# Patient Record
Sex: FEMALE | Employment: UNEMPLOYED | URBAN - METROPOLITAN AREA
[De-identification: names, ages, dates, MRNs, and addresses within clinical notes are randomized per-mention and may not be internally consistent; named-entity substitution may affect disease eponyms.]

---

## 2017-04-28 ENCOUNTER — OFFICE VISIT (OUTPATIENT)
Dept: FAMILY MEDICINE CLINIC | Age: 27
End: 2017-04-28

## 2017-04-28 VITALS
BODY MASS INDEX: 42.33 KG/M2 | HEART RATE: 82 BPM | TEMPERATURE: 98 F | RESPIRATION RATE: 18 BRPM | OXYGEN SATURATION: 98 % | SYSTOLIC BLOOD PRESSURE: 114 MMHG | HEIGHT: 62 IN | WEIGHT: 230 LBS | DIASTOLIC BLOOD PRESSURE: 78 MMHG

## 2017-04-28 DIAGNOSIS — R35.0 URINARY FREQUENCY: ICD-10-CM

## 2017-04-28 DIAGNOSIS — M25.471 ANKLE EDEMA, BILATERAL: Primary | ICD-10-CM

## 2017-04-28 DIAGNOSIS — E55.9 HYPOVITAMINOSIS D: ICD-10-CM

## 2017-04-28 DIAGNOSIS — J45.20 MILD INTERMITTENT ASTHMA WITHOUT COMPLICATION: ICD-10-CM

## 2017-04-28 DIAGNOSIS — J30.1 SEASONAL ALLERGIC RHINITIS DUE TO POLLEN: ICD-10-CM

## 2017-04-28 DIAGNOSIS — M25.472 ANKLE EDEMA, BILATERAL: Primary | ICD-10-CM

## 2017-04-28 RX ORDER — ALBUTEROL SULFATE 90 UG/1
AEROSOL, METERED RESPIRATORY (INHALATION)
Qty: 1 INHALER | Refills: 1 | Status: SHIPPED | OUTPATIENT
Start: 2017-04-28 | End: 2017-10-22

## 2017-04-28 RX ORDER — ACETAMINOPHEN 500 MG
2000 TABLET ORAL DAILY
Qty: 90 CAP | Refills: 5 | Status: SHIPPED | OUTPATIENT
Start: 2017-04-28 | End: 2018-01-22 | Stop reason: SDUPTHER

## 2017-04-28 RX ORDER — CETIRIZINE HCL 10 MG
10 TABLET ORAL DAILY
Qty: 30 TAB | Refills: 4 | Status: SHIPPED | OUTPATIENT
Start: 2017-04-28 | End: 2019-06-27 | Stop reason: SDUPTHER

## 2017-04-28 RX ORDER — HYDROCHLOROTHIAZIDE 25 MG/1
25 TABLET ORAL DAILY
Qty: 30 TAB | Refills: 5 | Status: SHIPPED | OUTPATIENT
Start: 2017-04-28 | End: 2018-01-22 | Stop reason: SDUPTHER

## 2017-04-28 NOTE — MR AVS SNAPSHOT
Visit Information Date & Time Provider Department Dept. Phone Encounter #  
 4/28/2017  8:15 AM Andrea Yadav MD [facility] at [address] [id number] Follow-up Instructions Return 2-3 weeks, for f/u treatment. Upcoming Health Maintenance Date Due DTaP/Tdap/Td series (1 - Tdap) 8/25/2017* PAP AKA CERVICAL CYTOLOGY 6/1/2018 *Topic was postponed. The date shown is not the original due date. Allergies as of 4/28/2017  Review Complete On: 4/28/2017 By: Andrea Yadav MD  
  
 Severity Noted Reaction Type Reactions Shellfish Derived High 07/22/2015    Swelling Wheezing Current Immunizations  Never Reviewed Name Date Hep B Vaccine 11/29/2000, 6/12/2000, 5/8/2000 Hib 3/3/1992 Influenza Vaccine (Quad) PF 10/7/2016 MMR 9/13/1993, 3/27/1990 Poliovirus vaccine 9/13/1993, 3/3/1992, 5/31/1989, 1/19/1989 Not reviewed this visit You Were Diagnosed With   
  
 Codes Comments Ankle edema, bilateral    -  Primary ICD-10-CM: M25.471, M25.472 ICD-9-CM: 719.07 Hypovitaminosis D     ICD-10-CM: E55.9 ICD-9-CM: 268.9 Urinary frequency     ICD-10-CM: R35.0 ICD-9-CM: 788.41 Seasonal allergic rhinitis due to pollen     ICD-10-CM: J30.1 ICD-9-CM: 477.0 Mild intermittent asthma without complication     EAM-14-FW: J45.20 ICD-9-CM: 493.90 Vitals BP Pulse Temp Resp Height(growth percentile) Weight(growth percentile) 114/78 (BP 1 Location: Left arm, BP Patient Position: Sitting) 82 98 °F (36.7 °C) 18 5' 2\" (1.575 m) 230 lb (104.3 kg) LMP SpO2 BMI OB Status Smoking Status 04/15/2017 98% 42.07 kg/m2 Having regular periods Never Smoker Vitals History BMI and BSA Data Body Mass Index Body Surface Area 42.07 kg/m 2 2.14 m 2 Preferred Pharmacy Pharmacy Name Phone [facility], [address] [place] [address] AT [address] [place] [address] Drive [phone] Your Updated Medication List  
  
   
This list is accurate as of: 4/28/17  9:02 AM.  Always use your most recent med list.  
  
  
  
  
 albuterol 90 mcg/actuation inhaler Commonly known as:  PROVENTIL HFA, VENTOLIN HFA, PROAIR HFA Take 1 Puff by inhalation every 4 hours as needed for Wheezing. Indications: BRONCHOSPASM PREVENTION  
  
 cetirizine 10 mg tablet Commonly known as:  ZYRTEC Take 1 Tab by mouth daily. Cholecalciferol (Vitamin D3) 2,000 unit Cap capsule Commonly known as:  VITAMIN D3 Take 2,000 Units by mouth daily. Indications: VITAMIN D DEFICIENCY (HIGH DOSE THERAPY)  
  
 cholestyramine light 4 gram packet Commonly known as:  Scottie Vanegas Take 4 g by mouth daily. hydroCHLOROthiazide 25 mg tablet Commonly known as:  HYDRODIURIL Take 1 Tab by mouth daily. Indications: Edema, hypertension  
  
 multivitamin with iron tablet Take 1 Tab by mouth daily. Prescriptions Sent to Pharmacy Refills  
 hydroCHLOROthiazide (HYDRODIURIL) 25 mg tablet 5 Sig: Take 1 Tab by mouth daily. Indications: Edema, hypertension Class: Normal  
 Pharmacy: [facility] [address] AT [address] Ph #: [phone] Route: Oral  
 Cholecalciferol, Vitamin D3, (VITAMIN D3) 2,000 unit cap capsule 5 Sig: Take 2,000 Units by mouth daily. Indications: VITAMIN D DEFICIENCY (HIGH DOSE THERAPY) Class: Normal  
 Pharmacy: [facility] [address] AT [address] Ph #: [phone] Route: Oral  
 albuterol (PROVENTIL HFA, VENTOLIN HFA, PROAIR HFA) 90 mcg/actuation inhaler 1 Sig: Take 1 Puff by inhalation every 4 hours as needed for Wheezing. Indications: BRONCHOSPASM PREVENTION Class: Normal  
 Pharmacy: [facility] [address] AT [address] Ph #: [phone] cetirizine (ZYRTEC) 10 mg tablet 4 Sig: Take 1 Tab by mouth daily. Class: Normal  
 Pharmacy: [facility] [address] AT [address] Ph #: [phone] Route: Oral  
  
We Performed the Following METABOLIC PANEL, COMPREHENSIVE [75263 CPT(R)] URINALYSIS W/ RFLX MICROSCOPIC [40346 CPT(R)] VITAMIN D, 25 HYDROXY O252180 CPT(R)] Follow-up Instructions Return 2-3 weeks, for f/u treatment. Patient Instructions Managing Your Allergies: Care Instructions Your Care Instructions Managing your allergies is an important part of staying healthy. Your doctor will help you find out what may be causing the allergies. Common causes of allergy symptoms are house dust and dust mites, animal dander, mold, and pollen. As soon as you know what triggers your symptoms, try to reduce your exposure to your triggers. This can help prevent allergy symptoms, asthma, and other health problems. Ask your doctor about allergy medicine or immunotherapy. These treatments may help reduce or prevent allergy symptoms. Follow-up care is a key part of your treatment and safety. Be sure to make and go to all appointments, and call your doctor if you are having problems. It's also a good idea to know your test results and keep a list of the medicines you take. How can you care for yourself at home? · If you think that dust or dust mites are causing your allergies: 
¨ Wash sheets, pillowcases, and other bedding every week in hot water. ¨ Use airtight, dust-proof covers for pillows, duvets, and mattresses. Avoid plastic covers, because they tend to tear quickly and do not \"breathe. \" Wash according to the instructions. ¨ Remove extra blankets and pillows that you don't need. ¨ Use blankets that are machine-washable. ¨ Don't use home humidifiers. They can help mites live longer. · Use air-conditioning. Change or clean all filters every month. Keep windows closed. Use high-efficiency air filters. Don't use window or attic fans, which draw dust into the air. · If you're allergic to pet dander, keep pets outside or, at the very least, out of your bedroom. Old carpet and cloth-covered furniture can hold a lot of animal dander. You may need to replace them. · Look for signs of cockroaches. Use cockroach baits to get rid of them. Then clean your home well. · If you're allergic to mold, don't keep indoor plants, because molds can grow in soil. Get rid of furniture, rugs, and drapes that smell musty. Check for mold in the bathroom. · If you're allergic to pollen, stay inside when pollen counts are high. · Don't smoke or let anyone else smoke in your house. Don't use fireplaces or wood-burning stoves. Avoid paint fumes, perfumes, and other strong odors. When should you call for help? Give an epinephrine shot if: 
· You think you are having a severe allergic reaction. · You have symptoms in more than one body area, such as mild nausea and an itchy mouth. After giving an epinephrine shot call 911, even if you feel better. Call 911 if: 
· You have symptoms of a severe allergic reaction. These may include: 
¨ Sudden raised, red areas (hives) all over your body. ¨ Swelling of the throat, mouth, lips, or tongue. ¨ Trouble breathing. ¨ Passing out (losing consciousness). Or you may feel very lightheaded or suddenly feel weak, confused, or restless. · You have been given an epinephrine shot, even if you feel better. Call your doctor now or seek immediate medical care if: 
· You have symptoms of an allergic reaction, such as: ¨ A rash or hives (raised, red areas on the skin). ¨ Itching. ¨ Swelling. ¨ Belly pain, nausea, or vomiting. Watch closely for changes in your health, and be sure to contact your doctor if: 
· Your allergies get worse. · You need help controlling your allergies. · You have questions about allergy testing. · You do not get better as expected. Where can you learn more? Go to http://jamin-neisha.info/. Enter L249 in the search box to learn more about \"Managing Your Allergies: Care Instructions. \" Current as of: February 12, 2016 Content Version: 11.2 © 9681-2115 [employer]. Care instructions adapted under license by [employer] (which disclaims liability or warranty for this information). If you have questions about a medical condition or this instruction, always ask your healthcare professional. [address] any warranty or liability for your use of this information. Introducing [facility]! [facility] introduces Someecards patient portal. Now you can access parts of your medical record, email your doctor's office, and request medication refills online. 1. In your internet browser, go to [email or web address] 2. Click on the First Time User? Click Here link in the Sign In box. You will see the New Member Sign Up page. 3. Enter your Someecards Access Code exactly as it appears below. You will not need to use this code after youve completed the sign-up process. If you do not sign up before the expiration date, you must request a new code. · Someecards Access Code: [facility] Expires: 7/27/2017  9:02 AM 
 
4. Enter the last four digits of your Social Security Number (xxxx) and Date of Birth (mm/dd/yyyy) as indicated and click Submit. You will be taken to the next sign-up page. 5. Create a BigStringt ID. This will be your Someecards login ID and cannot be changed, so think of one that is secure and easy to remember. 6. Create a Someecards password. You can change your password at any time. 7. Enter your Password Reset Question and Answer. This can be used at a later time if you forget your password. 8. Enter your e-mail address.  You will receive e-mail notification when new information is available in OrthoScan. 9. Click Sign Up. You can now view and download portions of your medical record. 10. Click the Download Summary menu link to download a portable copy of your medical information. If you have questions, please visit the Frequently Asked Questions section of the OrthoScan website. Remember, OrthoScan is NOT to be used for urgent needs. For medical emergencies, dial 911. Now available from your iPhone and Android! Please provide this summary of care documentation to your next provider. Your primary care clinician is listed as Bharti Pastrana. If you have any questions after today's visit, please call [phone].

## 2017-04-28 NOTE — PROGRESS NOTES
Chief Complaint   Patient presents with    Hypertension    Allergic Rhinitis     HPI:  Myriam Edmondson is a  female with significant pmh of asthma, hypertension, ankle swelling and low vit d level presenting for 6 month routine follow up. She says asthma is stable at this time. Blood pressure and leg swelling are controlled on treatment. Also, she has additional complaints of running nose, tearing, sneezing due to constant change in weather. Review of Systems  As per hpi    Past Medical History:   Diagnosis Date    Asthma     exercise / cold induced doesn't use inhaler     Past Surgical History:   Procedure Laterality Date    ABDOMEN SURGERY PROC UNLISTED      HX CHOLECYSTECTOMY       Social History    Marital status: SINGLE     Spouse name: N/A    Number of children: N/A    Years of education: N/A     Social History Main Topics    Smoking status: Never Smoker    Smokeless tobacco: Never Used    Alcohol use No    Drug use: No    Sexual activity: Yes     Partners: Male     Birth control/ protection: IUD     Current Outpatient Prescriptions   Medication Sig Dispense Refill    hydroCHLOROthiazide (HYDRODIURIL) 25 mg tablet Take 1 Tab by mouth daily. Indications: Edema, Hypertension 30 Tab 5    Cholecalciferol, Vitamin D3, (VITAMIN D3) 2,000 unit cap capsule Take 2,000 Units by mouth daily. Indications: VITAMIN D DEFICIENCY (HIGH DOSE THERAPY) 90 Cap 5    cholestyramine light (QUESTRAN LITE) 4 gram packet Take 4 g by mouth daily.  multivitamin with iron tablet Take 1 Tab by mouth daily.        Allergies   Allergen Reactions    Shellfish Derived Swelling     Wheezing     Objective:  Visit Vitals    /78 (BP 1 Location: Left arm, BP Patient Position: Sitting)    Pulse 82    Temp 98 °F (36.7 °C)    Resp 18    Ht 5' 2\" (1.575 m)    Wt 230 lb (104.3 kg)    LMP 04/15/2017    SpO2 98%    BMI 42.07 kg/m2     Physical Exam:   General appearance - alert, obese appearing, in no distress  Mental status - alert, oriented to person, place, and time  EYE-PERRL, EOMI  Nose - no congestion   Neck - supple, no significant adenopathy   Chest - clear to auscultation, no wheezes, rales or rhonchi  Heart - normal rate, regular rhythm, normal blood pressure  Abdomen - soft, nontender, nondistended, no organomegaly  Ext-peripheral pulses normal, no pedal edema    Assessment/Plan:    ICD-10-CM ICD-9-CM    1. Ankle edema, bilateral W59.736 105.03 METABOLIC PANEL, COMPREHENSIVE    M25.472  hydroCHLOROthiazide (HYDRODIURIL) 25 mg tablet   2. Hypovitaminosis D E55.9 268.9 VITAMIN D, 25 HYDROXY      Cholecalciferol, Vitamin D3, (VITAMIN D3) 2,000 unit cap capsule   3. Urinary frequency R35.0 788.41 URINALYSIS W/ RFLX MICROSCOPIC   4. Seasonal allergic rhinitis due to pollen J30.1 477.0 albuterol (PROVENTIL HFA, VENTOLIN HFA, PROAIR HFA) 90 mcg/actuation inhaler      cetirizine (ZYRTEC) 10 mg tablet   5. Mild intermittent asthma without complication V77.88 604.10 albuterol (PROVENTIL HFA, VENTOLIN HFA, PROAIR HFA) 90 mcg/actuation inhaler     Patient Instructions        Managing Your Allergies: Care Instructions  Your Care Instructions  Managing your allergies is an important part of staying healthy. Your doctor will help you find out what may be causing the allergies. Common causes of allergy symptoms are house dust and dust mites, animal dander, mold, and pollen. As soon as you know what triggers your symptoms, try to reduce your exposure to your triggers. This can help prevent allergy symptoms, asthma, and other health problems. Ask your doctor about allergy medicine or immunotherapy. These treatments may help reduce or prevent allergy symptoms. Follow-up care is a key part of your treatment and safety. Be sure to make and go to all appointments, and call your doctor if you are having problems. It's also a good idea to know your test results and keep a list of the medicines you take.   How can you care for yourself at home? · If you think that dust or dust mites are causing your allergies:  ¨ Wash sheets, pillowcases, and other bedding every week in hot water. ¨ Use airtight, dust-proof covers for pillows, duvets, and mattresses. Avoid plastic covers, because they tend to tear quickly and do not \"breathe. \" Wash according to the instructions. ¨ Remove extra blankets and pillows that you don't need. ¨ Use blankets that are machine-washable. ¨ Don't use home humidifiers. They can help mites live longer. · Use air-conditioning. Change or clean all filters every month. Keep windows closed. Use high-efficiency air filters. Don't use window or attic fans, which draw dust into the air. · If you're allergic to pet dander, keep pets outside or, at the very least, out of your bedroom. Old carpet and cloth-covered furniture can hold a lot of animal dander. You may need to replace them. · Look for signs of cockroaches. Use cockroach baits to get rid of them. Then clean your home well. · If you're allergic to mold, don't keep indoor plants, because molds can grow in soil. Get rid of furniture, rugs, and drapes that smell musty. Check for mold in the bathroom. · If you're allergic to pollen, stay inside when pollen counts are high. · Don't smoke or let anyone else smoke in your house. Don't use fireplaces or wood-burning stoves. Avoid paint fumes, perfumes, and other strong odors. When should you call for help? Give an epinephrine shot if:  · You think you are having a severe allergic reaction. · You have symptoms in more than one body area, such as mild nausea and an itchy mouth. After giving an epinephrine shot call 911, even if you feel better. Call 911 if:  · You have symptoms of a severe allergic reaction. These may include:  ¨ Sudden raised, red areas (hives) all over your body. ¨ Swelling of the throat, mouth, lips, or tongue. ¨ Trouble breathing. ¨ Passing out (losing consciousness).  Or you may feel very lightheaded or suddenly feel weak, confused, or restless. · You have been given an epinephrine shot, even if you feel better. Call your doctor now or seek immediate medical care if:  · You have symptoms of an allergic reaction, such as:  ¨ A rash or hives (raised, red areas on the skin). ¨ Itching. ¨ Swelling. ¨ Belly pain, nausea, or vomiting. Watch closely for changes in your health, and be sure to contact your doctor if:  · Your allergies get worse. · You need help controlling your allergies. · You have questions about allergy testing. · You do not get better as expected. Where can you learn more? Go to http://jamin-neisha.info/. Enter L249 in the search box to learn more about \"Managing Your Allergies: Care Instructions. \"  Current as of: February 12, 2016  Content Version: 11.2  © 7085-0097 [employer]. Care instructions adapted under license by [employer] (which disclaims liability or warranty for this information). If you have questions about a medical condition or this instruction, always ask your healthcare professional. [address] any warranty or liability for your use of this information. Follow-up Disposition:  Return 2-3 weeks, for f/u treatment.

## 2017-04-28 NOTE — PATIENT INSTRUCTIONS
Managing Your Allergies: Care Instructions  Your Care Instructions  Managing your allergies is an important part of staying healthy. Your doctor will help you find out what may be causing the allergies. Common causes of allergy symptoms are house dust and dust mites, animal dander, mold, and pollen. As soon as you know what triggers your symptoms, try to reduce your exposure to your triggers. This can help prevent allergy symptoms, asthma, and other health problems. Ask your doctor about allergy medicine or immunotherapy. These treatments may help reduce or prevent allergy symptoms. Follow-up care is a key part of your treatment and safety. Be sure to make and go to all appointments, and call your doctor if you are having problems. It's also a good idea to know your test results and keep a list of the medicines you take. How can you care for yourself at home? · If you think that dust or dust mites are causing your allergies:  ¨ Wash sheets, pillowcases, and other bedding every week in hot water. ¨ Use airtight, dust-proof covers for pillows, duvets, and mattresses. Avoid plastic covers, because they tend to tear quickly and do not \"breathe. \" Wash according to the instructions. ¨ Remove extra blankets and pillows that you don't need. ¨ Use blankets that are machine-washable. ¨ Don't use home humidifiers. They can help mites live longer. · Use air-conditioning. Change or clean all filters every month. Keep windows closed. Use high-efficiency air filters. Don't use window or attic fans, which draw dust into the air. · If you're allergic to pet dander, keep pets outside or, at the very least, out of your bedroom. Old carpet and cloth-covered furniture can hold a lot of animal dander. You may need to replace them. · Look for signs of cockroaches. Use cockroach baits to get rid of them. Then clean your home well. · If you're allergic to mold, don't keep indoor plants, because molds can grow in soil.  Get rid of furniture, rugs, and drapes that smell musty. Check for mold in the bathroom. · If you're allergic to pollen, stay inside when pollen counts are high. · Don't smoke or let anyone else smoke in your house. Don't use fireplaces or wood-burning stoves. Avoid paint fumes, perfumes, and other strong odors. When should you call for help? Give an epinephrine shot if:  · You think you are having a severe allergic reaction. · You have symptoms in more than one body area, such as mild nausea and an itchy mouth. After giving an epinephrine shot call 911, even if you feel better. Call 911 if:  · You have symptoms of a severe allergic reaction. These may include:  ¨ Sudden raised, red areas (hives) all over your body. ¨ Swelling of the throat, mouth, lips, or tongue. ¨ Trouble breathing. ¨ Passing out (losing consciousness). Or you may feel very lightheaded or suddenly feel weak, confused, or restless. · You have been given an epinephrine shot, even if you feel better. Call your doctor now or seek immediate medical care if:  · You have symptoms of an allergic reaction, such as:  ¨ A rash or hives (raised, red areas on the skin). ¨ Itching. ¨ Swelling. ¨ Belly pain, nausea, or vomiting. Watch closely for changes in your health, and be sure to contact your doctor if:  · Your allergies get worse. · You need help controlling your allergies. · You have questions about allergy testing. · You do not get better as expected. Where can you learn more? Go to http://jamin-neisha.info/. Enter L249 in the search box to learn more about \"Managing Your Allergies: Care Instructions. \"  Current as of: February 12, 2016  Content Version: 11.2  © 3054-5221 [employer] ([employer]). Care instructions adapted under license by [employer] (which disclaims liability or warranty for this information).  If you have questions about a medical condition or this instruction, always ask your healthcare professional. [address] any warranty or liability for your use of this information.

## 2017-04-29 LAB
25(OH)D3+25(OH)D2 SERPL-MCNC: 31.5 NG/ML (ref 30–100)
ALBUMIN SERPL-MCNC: 4 G/DL (ref 3.5–5.5)
ALBUMIN/GLOB SERPL: 1.6 {RATIO} (ref 1.2–2.2)
ALP SERPL-CCNC: 78 IU/L (ref 39–117)
ALT SERPL-CCNC: 14 IU/L (ref 0–32)
APPEARANCE UR: CLEAR
AST SERPL-CCNC: 16 IU/L (ref 0–40)
BILIRUB SERPL-MCNC: 0.3 MG/DL (ref 0–1.2)
BILIRUB UR QL STRIP: NEGATIVE
BUN SERPL-MCNC: 8 MG/DL (ref 6–20)
BUN/CREAT SERPL: 13 (ref 9–23)
CALCIUM SERPL-MCNC: 9.2 MG/DL (ref 8.7–10.2)
CHLORIDE SERPL-SCNC: 99 MMOL/L (ref 96–106)
CO2 SERPL-SCNC: 24 MMOL/L (ref 18–29)
COLOR UR: YELLOW
CREAT SERPL-MCNC: 0.64 MG/DL (ref 0.57–1)
GLOBULIN SER CALC-MCNC: 2.5 G/DL (ref 1.5–4.5)
GLUCOSE SERPL-MCNC: 82 MG/DL (ref 65–99)
GLUCOSE UR QL: NEGATIVE
HGB UR QL STRIP: NEGATIVE
KETONES UR QL STRIP: NEGATIVE
LEUKOCYTE ESTERASE UR QL STRIP: NEGATIVE
MICRO URNS: NORMAL
NITRITE UR QL STRIP: NEGATIVE
PH UR STRIP: 6 [PH] (ref 5–7.5)
POTASSIUM SERPL-SCNC: 4 MMOL/L (ref 3.5–5.2)
PROT SERPL-MCNC: 6.5 G/DL (ref 6–8.5)
PROT UR QL STRIP: NEGATIVE
SODIUM SERPL-SCNC: 137 MMOL/L (ref 134–144)
SP GR UR: 1.01 (ref 1–1.03)
UROBILINOGEN UR STRIP-MCNC: 0.2 MG/DL (ref 0.2–1)

## 2017-05-19 ENCOUNTER — OFFICE VISIT (OUTPATIENT)
Dept: FAMILY MEDICINE CLINIC | Age: 27
End: 2017-05-19

## 2017-05-19 VITALS
WEIGHT: 240.8 LBS | HEART RATE: 81 BPM | TEMPERATURE: 97.3 F | BODY MASS INDEX: 44.31 KG/M2 | HEIGHT: 62 IN | OXYGEN SATURATION: 96 % | DIASTOLIC BLOOD PRESSURE: 73 MMHG | RESPIRATION RATE: 20 BRPM | SYSTOLIC BLOOD PRESSURE: 121 MMHG

## 2017-05-19 DIAGNOSIS — E55.9 HYPOVITAMINOSIS D: Primary | ICD-10-CM

## 2017-05-19 DIAGNOSIS — Z32.00 POSSIBLE PREGNANCY, NOT CONFIRMED: ICD-10-CM

## 2017-05-19 NOTE — PROGRESS NOTES
1. Have you been to the ER, urgent care clinic since your last visit? Hospitalized since your last visit? No    2. Have you seen or consulted any other health care providers outside of the [address] [place] [address] since your last visit? Include any pap smears or colon screening.  No     Pt is here for follow up on lab

## 2017-05-19 NOTE — MR AVS SNAPSHOT
Visit Information Date & Time Provider Department Dept. Phone Encounter #  
 5/19/2017  8:30 AM Da Mejias MD [facility] at [address] [id number] Follow-up Instructions Return in about 4 months (around 9/19/2017), or if symptoms worsen or fail to improve, for routine follow up. Upcoming Health Maintenance Date Due Pneumococcal 19-64 Medium Risk (1 of 1 - PPSV23) 8/30/2007 DTaP/Tdap/Td series (1 - Tdap) 8/25/2017* INFLUENZA AGE 9 TO ADULT 8/1/2017 PAP AKA CERVICAL CYTOLOGY 6/1/2018 *Topic was postponed. The date shown is not the original due date. Allergies as of 5/19/2017  Review Complete On: 5/19/2017 By: Da Mejias MD  
  
 Severity Noted Reaction Type Reactions Shellfish Derived High 07/22/2015    Swelling Wheezing Current Immunizations  Never Reviewed Name Date Hep B Vaccine 11/29/2000, 6/12/2000, 5/8/2000 Hib 3/3/1992 Influenza Vaccine (Quad) PF 10/7/2016 MMR 9/13/1993, 3/27/1990 Poliovirus vaccine 9/13/1993, 3/3/1992, 5/31/1989, 1/19/1989 Not reviewed this visit You Were Diagnosed With   
  
 Codes Comments Hypovitaminosis D    -  Primary ICD-10-CM: E55.9 ICD-9-CM: 268.9 Possible pregnancy, not confirmed     ICD-10-CM: Z32.00 ICD-9-CM: V72.40 Vitals BP Pulse Temp Resp Height(growth percentile) Weight(growth percentile) 121/73 (BP 1 Location: Right arm, BP Patient Position: Sitting) 81 97.3 °F (36.3 °C) (Oral) 20 5' 2\" (1.575 m) 240 lb 12.8 oz (109.2 kg) LMP SpO2 BMI OB Status Smoking Status 04/15/2017 96% 44.04 kg/m2 Having regular periods Never Smoker Vitals History BMI and BSA Data Body Mass Index Body Surface Area 44.04 kg/m 2 2.19 m 2 Preferred Pharmacy Pharmacy Name Phone [facility] [address] AT [address] [phone] Your Updated Medication List  
  
   
 This list is accurate as of: 5/19/17  9:37 AM.  Always use your most recent med list.  
  
  
  
  
 albuterol 90 mcg/actuation inhaler Commonly known as:  PROVENTIL HFA, VENTOLIN HFA, PROAIR HFA Take 1 Puff by inhalation every 4 hours as needed for Wheezing. Indications: BRONCHOSPASM PREVENTION  
  
 cetirizine 10 mg tablet Commonly known as:  ZYRTEC Take 1 Tab by mouth daily. Cholecalciferol (Vitamin D3) 2,000 unit Cap capsule Commonly known as:  VITAMIN D3 Take 2,000 Units by mouth daily. Indications: VITAMIN D DEFICIENCY (HIGH DOSE THERAPY)  
  
 hydroCHLOROthiazide 25 mg tablet Commonly known as:  HYDRODIURIL Take 1 Tab by mouth daily. Indications: Edema, hypertension  
  
 multivitamin with iron tablet Take 1 Tab by mouth daily. Follow-up Instructions Return in about 4 months (around 9/19/2017), or if symptoms worsen or fail to improve, for routine follow up. Patient Instructions You may continue with hydrochlorothiazide since you were already taking it prior to conception. Advice further discussion with ob/gyn Also, stop cholestyramine if pregnancy is confirmed Introducing \[facility] [place] [facility]\"" & HEALTH SERVICES! Rosamaria Albrecht introduces Preggers patient portal. Now you can access parts of your medical record, email your doctor's office, and request medication refills online. 1. In your internet browser, go to [email or web address] 2. Click on the First Time User? Click Here link in the Sign In box. You will see the New Member Sign Up page. 3. Enter your Preggers Access Code exactly as it appears below. You will not need to use this code after youve completed the sign-up process. If you do not sign up before the expiration date, you must request a new code. · Pre[facility]ers Access Code: [facility] Expires: 7/27/2017  9:02 AM 
 
4.  Enter the last four digits of your Social Security Number (xxxx) and Date of Birth (mm/dd/yyyy) as indicated and click Submit. You will be taken to the next sign-up page. 5. Create a Flocktory ID. This will be your Flocktory login ID and cannot be changed, so think of one that is secure and easy to remember. 6. Create a Flocktory password. You can change your password at any time. 7. Enter your Password Reset Question and Answer. This can be used at a later time if you forget your password. 8. Enter your e-mail address. You will receive e-mail notification when new information is available in [address] 9. Click Sign Up. You can now view and download portions of your medical record. 10. Click the Download Summary menu link to download a portable copy of your medical information. If you have questions, please visit the Frequently Asked Questions section of the Flocktory website. Remember, Flocktory is NOT to be used for urgent needs. For medical emergencies, dial 911. Now available from your iPhone and Android! Please provide this summary of care documentation to your next provider. Your primary care clinician is listed as [place] Yeny. If you have any questions after today's visit, please call [phone].

## 2017-05-19 NOTE — PROGRESS NOTES
Chief Complaint   Patient presents with    Results     HPI:  Kristy Wilson is a 27 y.o.  female presents to review results. So far blood work obtained are all within normal limits including serum vit D which is slowly improving. Patient is concerned if her current medications(hydrochlorothiazide, cholestyramine, vit D) can be safely used in pregnancy. She had a positive home pregnancy test, she has appointment with her gynecologist later today for confirmatory test. Advised her to stop cholesteremia if pregnancy is confirmed due to teratogenicity. Review of Systems  As per hpi    Past Medical History:   Diagnosis Date    Asthma     exercise / cold induced doesn't use inhaler     Past Surgical History:   Procedure Laterality Date    ABDOMEN SURGERY PROC UNLISTED      HX CHOLECYSTECTOMY       Social History    Marital status: SINGLE     Spouse name: N/A    Number of children: N/A    Years of education: N/A     Social History Main Topics    Smoking status: Never Smoker    Smokeless tobacco: Never Used    Alcohol use No    Drug use: No    Sexual activity: Yes     Partners: Male     Birth control/ protection: IUD     Current Outpatient Prescriptions   Medication Sig Dispense Refill    hydroCHLOROthiazide (HYDRODIURIL) 25 mg tablet Take 1 Tab by mouth daily. Indications: Edema, hypertension 30 Tab 5    Cholecalciferol, Vitamin D3, (VITAMIN D3) 2,000 unit cap capsule Take 2,000 Units by mouth daily. Indications: VITAMIN D DEFICIENCY (HIGH DOSE THERAPY) 90 Cap 5    albuterol (PROVENTIL HFA, VENTOLIN HFA, PROAIR HFA) 90 mcg/actuation inhaler Take 1 Puff by inhalation every 4 hours as needed for Wheezing. Indications: BRONCHOSPASM PREVENTION 1 Inhaler 1    cetirizine (ZYRTEC) 10 mg tablet Take 1 Tab by mouth daily. 30 Tab 4    cholestyramine light (QUESTRAN LITE) 4 gram packet Take 4 g by mouth daily.  multivitamin with iron tablet Take 1 Tab by mouth daily.        Allergies   Allergen Reactions    Shellfish Derived Swelling     Wheezing     Objective:  Visit Vitals    /73 (BP 1 Location: Right arm, BP Patient Position: Sitting)    Pulse 81    Temp 97.3 °F (36.3 °C) (Oral)    Resp 20    Ht 5' 2\" (1.575 m)    Wt 240 lb 12.8 oz (109.2 kg)    LMP 04/15/2017    SpO2 96%    BMI 44.04 kg/m2     Physical Exam:   General appearance - alert, well appearing, in no distress  Mental status - alert, oriented to person, place, and time  EYE-PERRL, EOMI  Chest - clear to auscultation, no wheezes, rales or rhonchi  Heart - normal rate, regular rhythm, normal blood pressure  Abdomen - soft, nontender, nondistended, no organomegaly  Ext-peripheral pulses normal, no pedal edema  Neuro -alert, oriented, normal speech, no focal findings     Results for orders placed or performed in visit on 04/28/17   URINALYSIS W/ RFLX MICROSCOPIC   Result Value Ref Range    Specific Gravity 1.007 1.005 - 1.030    pH (UA) 6.0 5.0 - 7.5    Color Yellow Yellow    Appearance Clear Clear    Leukocyte Esterase Negative Negative    Protein Negative Negative/Trace    Glucose Negative Negative    Ketone Negative Negative    Blood Negative Negative    Bilirubin Negative Negative    Urobilinogen 0.2 0.2 - 1.0 mg/dL    Nitrites Negative Negative    Microscopic Examination Comment    METABOLIC PANEL, COMPREHENSIVE   Result Value Ref Range    Glucose 82 65 - 99 mg/dL    BUN 8 6 - 20 mg/dL    Creatinine 0.64 0.57 - 1.00 mg/dL    GFR est non- >59 mL/min/1.73    GFR est  >59 mL/min/1.73    BUN/Creatinine ratio 13 9 - 23    Sodium 137 134 - 144 mmol/L    Potassium 4.0 3.5 - 5.2 mmol/L    Chloride 99 96 - 106 mmol/L    CO2 24 18 - 29 mmol/L    Calcium 9.2 8.7 - 10.2 mg/dL    Protein, total 6.5 6.0 - 8.5 g/dL    Albumin 4.0 3.5 - 5.5 g/dL    GLOBULIN, TOTAL 2.5 1.5 - 4.5 g/dL    A-G Ratio 1.6 1.2 - 2.2    Bilirubin, total 0.3 0.0 - 1.2 mg/dL    Alk.  phosphatase 78 39 - 117 IU/L    AST (SGOT) 16 0 - 40 IU/L    ALT (SGPT) 14 0 - 32 IU/L   VITAMIN D, 25 HYDROXY   Result Value Ref Range    VITAMIN D, 25-HYDROXY 31.5 30.0 - 100.0 ng/mL     Assessment/Plan:    ICD-10-CM ICD-9-CM    1. Hypovitaminosis D E55.9 268.9    2. Possible pregnancy, not confirmed Z32.00 V72.40      Patient Instructions     You may continue with hydrochlorothiazide since you were already taking it prior to conception. Advice further discussion with ob/gyn  Also, stop cholestyramine if pregnancy is confirmed    Follow-up Disposition:  Return in about 4 months (around 9/19/2017), or if symptoms worsen or fail to improve, for routine follow up.

## 2017-05-19 NOTE — PATIENT INSTRUCTIONS
You may continue with hydrochlorothiazide since you were already taking it prior to conception.  Advice further discussion with ob/gyn  Also, stop cholestyramine if pregnancy is confirmed

## 2017-06-16 ENCOUNTER — ANESTHESIA EVENT (OUTPATIENT)
Dept: SURGERY | Age: 27
End: 2017-06-16
Payer: COMMERCIAL

## 2017-06-16 RX ORDER — MONTELUKAST SODIUM 4 MG/1
2 TABLET, CHEWABLE ORAL DAILY
COMMUNITY
End: 2017-10-24 | Stop reason: ALTCHOICE

## 2017-06-16 NOTE — PERIOP NOTES
[facility]  Ambulatory Surgery Unit  Pre-operative Instructions    Surgery/Procedure Date  6/19/17            Tentative Arrival Time 0615      1. On the day of your surgery/procedure, please report to the Ambulatory Surgery Unit Registration Desk and sign in at your designated time. The Ambulatory Surgery Unit is located in [facility] on the [facility] side of the [facility] across from the [address] [place] [address]. Please have all of your health insurance cards and a photo ID. 2. You must have someone with you to drive you home, as you should not drive a car for 24 hours following anesthesia. Please make arrangements for a responsible adult friend or family member to stay with you for at least the first 24 hours after your surgery. 3. Do not have anything to eat or drink (including water, gum, mints, coffee, juice) after midnight   6/18/17. This may not apply to medications prescribed by your physician. (Please note below the special instructions with medications to take the morning of surgery, if applicable.)    4. We recommend you do not drink any alcoholic beverages for 24 hours before and after your surgery. 5. Stop all Aspirin, non-steroidal anti-inflammatory drugs (i.e. Advil, Aleve), vitamins, and supplements as directed by your surgeon's office. **If you are currently taking Plavix, Coumadin, or other blood-thinning agents, contact your surgeon for instructions. **    6. In an effort to help prevent surgical site infection, we ask that you shower with an anti-bacterial soap (i.e. Dial or Safeguard) on the morning of surgery. Do not apply any lotions, powders, or deodorants after the shower on the day of your procedure. If applicable, please do not shave the operative site for 48 hours prior to surgery. 7. Wear comfortable clothes. Wear glasses instead of contacts. Do not bring any jewelry or money (other than copays or fees as instructed).  Do not wear make-up, particularly bella, the morning of your surgery. Do not wear nail polish, particularly if you are having foot /hand surgery. Wear your hair loose or down, no ponytails, buns, evonne pins or clips. All body piercings must be removed. 8. You should understand that if you do not follow these instructions your surgery may be cancelled. If your physical condition changes (i.e. fever, cold or flu) please contact your surgeon as soon as possible. 9. It is important that you be on time. If a situation occurs where you may be late, or if you have any questions or problems, please call [phone].    10. Your surgery time may be subject to change. You will receive a phone call the day prior to surgery to confirm your arrival time. 11. Pediatric patients: please bring a change of clothes, diapers, bottle/sippy cup, pacifier, etc.      Special Instructions: Take all medications and inhalers, as prescribed, on the morning of surgery with a sip of water EXCEPT: none      I understand a pre-operative phone call will be made to verify my surgery time. In the event that I am not available, I give permission for a message to be left on my answering service and/or with another person?       Yes     (instructions given verbally during phone assessment- pt voiced understanding)     ___________________      ___________________      ________________  (Signature of Patient)          (Witness)                   (Date and Time)

## 2017-06-19 ENCOUNTER — HOSPITAL ENCOUNTER (OUTPATIENT)
Age: 27
Setting detail: OUTPATIENT SURGERY
Discharge: HOME | End: 2017-06-19
Attending: OBSTETRICS & GYNECOLOGY | Admitting: OBSTETRICS & GYNECOLOGY
Payer: COMMERCIAL

## 2017-06-19 ENCOUNTER — ANESTHESIA (OUTPATIENT)
Dept: SURGERY | Age: 27
End: 2017-06-19
Payer: COMMERCIAL

## 2017-06-19 VITALS
HEART RATE: 66 BPM | SYSTOLIC BLOOD PRESSURE: 122 MMHG | DIASTOLIC BLOOD PRESSURE: 72 MMHG | OXYGEN SATURATION: 100 % | BODY MASS INDEX: 44.37 KG/M2 | WEIGHT: 241.13 LBS | HEIGHT: 62 IN | TEMPERATURE: 98.3 F | RESPIRATION RATE: 12 BRPM

## 2017-06-19 PROCEDURE — 76030000002 HC AMB SURG OR TIME FIRST 0.: Performed by: OBSTETRICS & GYNECOLOGY

## 2017-06-19 PROCEDURE — 74011250636 HC RX REV CODE- 250/636

## 2017-06-19 PROCEDURE — 76210000046 HC AMBSU PH II REC FIRST 0.5 HR: Performed by: OBSTETRICS & GYNECOLOGY

## 2017-06-19 PROCEDURE — 76210000040 HC AMBSU PH I REC FIRST 0.5 HR: Performed by: OBSTETRICS & GYNECOLOGY

## 2017-06-19 PROCEDURE — 74011000250 HC RX REV CODE- 250

## 2017-06-19 PROCEDURE — 77030030437 HC FLTR UTER VAC OCOA -A: Performed by: OBSTETRICS & GYNECOLOGY

## 2017-06-19 PROCEDURE — 74011250636 HC RX REV CODE- 250/636: Performed by: ANESTHESIOLOGY

## 2017-06-19 PROCEDURE — 77030008578 HC TBNG UTER SUC BUSS -A: Performed by: OBSTETRICS & GYNECOLOGY

## 2017-06-19 PROCEDURE — 77030011210: Performed by: OBSTETRICS & GYNECOLOGY

## 2017-06-19 PROCEDURE — 77030018846 HC SOL IRR STRL H20 ICUM -A: Performed by: OBSTETRICS & GYNECOLOGY

## 2017-06-19 PROCEDURE — 74011000250 HC RX REV CODE- 250: Performed by: ANESTHESIOLOGY

## 2017-06-19 PROCEDURE — 76060000073 HC AMB SURG ANES FIRST 0.5 HR: Performed by: OBSTETRICS & GYNECOLOGY

## 2017-06-19 PROCEDURE — 88305 TISSUE EXAM BY PATHOLOGIST: CPT | Performed by: OBSTETRICS & GYNECOLOGY

## 2017-06-19 PROCEDURE — 77030018836 HC SOL IRR NACL ICUM -A: Performed by: OBSTETRICS & GYNECOLOGY

## 2017-06-19 RX ORDER — SODIUM CHLORIDE 0.9 % (FLUSH) 0.9 %
5-10 SYRINGE (ML) INJECTION EVERY 8 HOURS
Status: DISCONTINUED | OUTPATIENT
Start: 2017-06-19 | End: 2017-06-19 | Stop reason: HOSPADM

## 2017-06-19 RX ORDER — ONDANSETRON 2 MG/ML
INJECTION INTRAMUSCULAR; INTRAVENOUS AS NEEDED
Status: DISCONTINUED | OUTPATIENT
Start: 2017-06-19 | End: 2017-06-19 | Stop reason: HOSPADM

## 2017-06-19 RX ORDER — DEXAMETHASONE SODIUM PHOSPHATE 4 MG/ML
INJECTION, SOLUTION INTRA-ARTICULAR; INTRALESIONAL; INTRAMUSCULAR; INTRAVENOUS; SOFT TISSUE AS NEEDED
Status: DISCONTINUED | OUTPATIENT
Start: 2017-06-19 | End: 2017-06-19 | Stop reason: HOSPADM

## 2017-06-19 RX ORDER — FENTANYL CITRATE 50 UG/ML
25 INJECTION, SOLUTION INTRAMUSCULAR; INTRAVENOUS
Status: DISCONTINUED | OUTPATIENT
Start: 2017-06-19 | End: 2017-06-19 | Stop reason: HOSPADM

## 2017-06-19 RX ORDER — FENTANYL CITRATE 50 UG/ML
INJECTION, SOLUTION INTRAMUSCULAR; INTRAVENOUS AS NEEDED
Status: DISCONTINUED | OUTPATIENT
Start: 2017-06-19 | End: 2017-06-19 | Stop reason: HOSPADM

## 2017-06-19 RX ORDER — DIPHENHYDRAMINE HYDROCHLORIDE 50 MG/ML
12.5 INJECTION, SOLUTION INTRAMUSCULAR; INTRAVENOUS AS NEEDED
Status: DISCONTINUED | OUTPATIENT
Start: 2017-06-19 | End: 2017-06-19 | Stop reason: HOSPADM

## 2017-06-19 RX ORDER — SODIUM CHLORIDE, SODIUM LACTATE, POTASSIUM CHLORIDE, CALCIUM CHLORIDE 600; 310; 30; 20 MG/100ML; MG/100ML; MG/100ML; MG/100ML
25 INJECTION, SOLUTION INTRAVENOUS CONTINUOUS
Status: DISCONTINUED | OUTPATIENT
Start: 2017-06-19 | End: 2017-06-19 | Stop reason: HOSPADM

## 2017-06-19 RX ORDER — SODIUM CHLORIDE 0.9 % (FLUSH) 0.9 %
5-10 SYRINGE (ML) INJECTION AS NEEDED
Status: DISCONTINUED | OUTPATIENT
Start: 2017-06-19 | End: 2017-06-19 | Stop reason: HOSPADM

## 2017-06-19 RX ORDER — HYDROMORPHONE HYDROCHLORIDE 1 MG/ML
.2-.5 INJECTION, SOLUTION INTRAMUSCULAR; INTRAVENOUS; SUBCUTANEOUS ONCE
Status: DISCONTINUED | OUTPATIENT
Start: 2017-06-19 | End: 2017-06-19 | Stop reason: HOSPADM

## 2017-06-19 RX ORDER — PROPOFOL 10 MG/ML
INJECTION, EMULSION INTRAVENOUS AS NEEDED
Status: DISCONTINUED | OUTPATIENT
Start: 2017-06-19 | End: 2017-06-19 | Stop reason: HOSPADM

## 2017-06-19 RX ORDER — MIDAZOLAM HYDROCHLORIDE 1 MG/ML
INJECTION, SOLUTION INTRAMUSCULAR; INTRAVENOUS AS NEEDED
Status: DISCONTINUED | OUTPATIENT
Start: 2017-06-19 | End: 2017-06-19 | Stop reason: HOSPADM

## 2017-06-19 RX ORDER — OXYCODONE AND ACETAMINOPHEN 5; 325 MG/1; MG/1
1 TABLET ORAL ONCE
Status: DISCONTINUED | OUTPATIENT
Start: 2017-06-19 | End: 2017-06-19 | Stop reason: HOSPADM

## 2017-06-19 RX ORDER — LIDOCAINE HYDROCHLORIDE 10 MG/ML
0.1 INJECTION, SOLUTION EPIDURAL; INFILTRATION; INTRACAUDAL; PERINEURAL AS NEEDED
Status: DISCONTINUED | OUTPATIENT
Start: 2017-06-19 | End: 2017-06-19 | Stop reason: HOSPADM

## 2017-06-19 RX ORDER — LIDOCAINE HYDROCHLORIDE 20 MG/ML
INJECTION, SOLUTION EPIDURAL; INFILTRATION; INTRACAUDAL; PERINEURAL AS NEEDED
Status: DISCONTINUED | OUTPATIENT
Start: 2017-06-19 | End: 2017-06-19 | Stop reason: HOSPADM

## 2017-06-19 RX ORDER — LIDOCAINE HCL/PF 10 MG/ML
1.5 SYRINGE (ML) INJECTION ONCE
Status: DISCONTINUED | OUTPATIENT
Start: 2017-06-19 | End: 2017-06-19 | Stop reason: HOSPADM

## 2017-06-19 RX ORDER — MORPHINE SULFATE 10 MG/ML
2 INJECTION, SOLUTION INTRAMUSCULAR; INTRAVENOUS
Status: DISCONTINUED | OUTPATIENT
Start: 2017-06-19 | End: 2017-06-19 | Stop reason: HOSPADM

## 2017-06-19 RX ADMIN — MIDAZOLAM HYDROCHLORIDE 2 MG: 1 INJECTION, SOLUTION INTRAMUSCULAR; INTRAVENOUS at 07:28

## 2017-06-19 RX ADMIN — PROPOFOL 50 MG: 10 INJECTION, EMULSION INTRAVENOUS at 07:41

## 2017-06-19 RX ADMIN — LIDOCAINE HYDROCHLORIDE 40 MG: 20 INJECTION, SOLUTION EPIDURAL; INFILTRATION; INTRACAUDAL; PERINEURAL at 07:35

## 2017-06-19 RX ADMIN — ONDANSETRON 4 MG: 2 INJECTION INTRAMUSCULAR; INTRAVENOUS at 07:45

## 2017-06-19 RX ADMIN — FENTANYL CITRATE 50 MCG: 50 INJECTION, SOLUTION INTRAMUSCULAR; INTRAVENOUS at 07:35

## 2017-06-19 RX ADMIN — SODIUM CHLORIDE, SODIUM LACTATE, POTASSIUM CHLORIDE, AND CALCIUM CHLORIDE 25 ML/HR: 600; 310; 30; 20 INJECTION, SOLUTION INTRAVENOUS at 06:45

## 2017-06-19 RX ADMIN — FENTANYL CITRATE 50 MCG: 50 INJECTION, SOLUTION INTRAMUSCULAR; INTRAVENOUS at 07:58

## 2017-06-19 RX ADMIN — PROPOFOL 50 MG: 10 INJECTION, EMULSION INTRAVENOUS at 07:35

## 2017-06-19 RX ADMIN — PROPOFOL 50 MG: 10 INJECTION, EMULSION INTRAVENOUS at 07:49

## 2017-06-19 RX ADMIN — PROPOFOL 30 MG: 10 INJECTION, EMULSION INTRAVENOUS at 07:52

## 2017-06-19 RX ADMIN — PROPOFOL 50 MG: 10 INJECTION, EMULSION INTRAVENOUS at 07:36

## 2017-06-19 RX ADMIN — FENTANYL CITRATE 50 MCG: 50 INJECTION, SOLUTION INTRAMUSCULAR; INTRAVENOUS at 07:45

## 2017-06-19 RX ADMIN — PROPOFOL 50 MG: 10 INJECTION, EMULSION INTRAVENOUS at 07:39

## 2017-06-19 RX ADMIN — DEXAMETHASONE SODIUM PHOSPHATE 8 MG: 4 INJECTION, SOLUTION INTRA-ARTICULAR; INTRALESIONAL; INTRAMUSCULAR; INTRAVENOUS; SOFT TISSUE at 07:45

## 2017-06-19 RX ADMIN — PROPOFOL 50 MG: 10 INJECTION, EMULSION INTRAVENOUS at 07:45

## 2017-06-19 RX ADMIN — FENTANYL CITRATE 50 MCG: 50 INJECTION, SOLUTION INTRAMUSCULAR; INTRAVENOUS at 08:03

## 2017-06-19 NOTE — OP NOTES
[address], [facility]   OP NOTE       Name:  Ally Perez   MR#:  [id number]   :  1990   Account #:  [de-identified]    Surgery Date:  2017   Date of Adm:  2017       PREOPERATIVE DIAGNOSIS: Missed  at 11 weeks. POSTOPERATIVE DIAGNOSIS: Missed  at 8 weeks. PROCEDURES PERFORMED: Suction dilatation and curettage. SPECIMENS REMOVED: Products of conception. ESTIMATED BLOOD LOSS: Less than 10 mL. COMPLICATIONS: None. CONDITION: Stable. ANESTHESIA: Monitored anesthesia care with paracervical block. DESCRIPTION OF PROCEDURE: After discussing with the patient the   risks, benefits and alternatives to procedure, the patient was taken to   the operating room with running IV. The patient placed in the supine   position, and then repositioned in dorsal lithotomy position and   prepped and draped in usual sterile fashion. Time-out was carried out   at that time. The sterile speculum was placed in the patient's vagina,   good visualization of the cervix. Anterior lip of cervix was grasped with   a single-tooth tenaculum, and a paracervical block with 1% lidocaine   10 mL injected. The uterus then sounded to 8 cm. The uterus was then   dilated with cervical dilators, and a size 10 curved curette introduced. Products of conception were suctioned out. Straight curetting was then   performed until all 4 sides of the uterus produced a gritty sound. The   suction curette was then reintroduced, and the remaining products of   conception were suctioned out. All the instruments were removed from   the patient's vagina. A single-tooth tenaculum was removed and the   site was hemostatic. The patient was taken to the recovery room in   stable condition. The specimen sent to Pathology. The patient was   sent home with doxycycline and Tylenol #3, with a followup in 1 week.         Cynthia Triplett MD        / [facility] ARMINDA ZAMORA   D: 06/19/2017   08:49   T:  06/19/2017   09:04   Job #:  [id number]

## 2017-06-19 NOTE — DISCHARGE INSTRUCTIONS
PATIENT DISCHARGE INSTRUCTIONS      PATIENT DISCHARGE INSTRUCTIONS    [facility] / [id number] : 1990    Admitted 2017 Discharged: 2017       · It is important that you take the medication exactly as they are prescribed. · Keep your medication in the bottles provided by the pharmacist and keep a list of the medication names, dosages, and times to be taken in your wallet. · Do not take other medications without consulting your doctor. What to do at Home    Recommended Diet: Regular Diet    Recommended Activity: Activity as tolerated and no driving for today    If you experience any of the following symptoms fever>101.4, please call [phone]        Signed By: Clemmie Rinne, MD     2017         Post-Operative Instructions GYN    For your own safety, a responsible adult must drive you home. Someone responsible should stay with you for the first 24 hours after surgery. If you had medication or general anesthesia, it is normal to feel drowsy and weak, therefore, it is not recommended that you drive, stand or walk without help, operate machinery, drink alcoholic beverages or eat heavy meals (due to nausea), make important personal or business decisions or sign important papers, children should not ride bicycles, skateboards, etc.  Please do not climb stairs or shower unattended for at least 24 hours. If your physician finds it necessary for you to have to take home medications, please obtain from the pharmacy of your choice. Notify your Physician:  Notify your physician, if you begin to show signs of infection such as swelling, heat, redness, foul smelling discharge or red streaks, pus formation, temperature of 100.5 or greater,  any other disruption of your surgical site or excessive bleeding. Care of dressings:  Vaginal bleeding should become less each day and last no more than 7 days.   Change your ross-pad frequently and continue with ross-care for as long as there is discharge. Diet:  Please try clear fluids and crackers and then wait about 30 minutes, if you have no nausea, you may eat normally. Avoid:  Avoid sexual intercourse, tampons or vaginal douches for two (2) weeks.            >>>You received Toradol during your surgery. You may not take any form of NSAIDS (non steroidal anti inflammatory drugs) such as Advil, Ibuprofen, Aleve, Motrin until 2:00pm.<<<      DO NOT TAKE TYLENOL/ACETAMINOPHEN WITH Tylenol #3. TAKE NARCOTIC PAIN MEDICATIONS WITH FOOD   Narcotics tend to be constipating, we suggest taking a stool softener such as Colace or Miralax (follow package instructions). DO NOT DRIVE WHILE TAKING NARCOTIC PAIN MEDICATIONS. DO NOT TAKE SLEEPING MEDICATIONS OR ANTIANXIETY MEDICATIONS WHILE TAKING NARCOTIC PAIN MEDICATIONS,  ESPECIALLY THE NIGHT OF ANESTHESIA. CPAP PATIENTS BE SURE TO WEAR MACHINE WHENEVER NAPPING OR SLEEPING. DISCHARGE SUMMARY from Nurse    The following personal items collected during your admission are returned to you:   Dental Appliance: Dental Appliances: None  Vision: Visual Aid: Glasses RETURNED TO PT  Hearing Aid:    Jewelry:    Clothing: Clothing:  (clothing under stretcher, purse to boyfriend, glasses to recovery)  Other Valuables:    Valuables sent to safe:        PATIENT INSTRUCTIONS:    After General Anesthesia or Intravenous Sedation, for 24 hours or while taking prescription Narcotics:        Someone should be with you for the next 24 hours. For your own safety, a responsible adult must drive you home. · Limit your activities  · Recommended activity: Rest today, up with assistance today. Do not climb stairs or shower unattended for the next 24 hours. · Please start with a soft bland diet and advance as tolerated (no nausea) to regular diet. · If you have a sore throat you should try the following: fluids, warm salt water gargles, or throat lozenges.  If it does not improve after several days please follow up with your primary physician. · Do not drive and operate hazardous machinery  · Do not make important personal or business decisions  · Do  not drink alcoholic beverages  · If you have not urinated within 8 hours after discharge, please contact your surgeon on call. Report the following to your surgeon:  · Excessive pain, swelling, redness or odor of or around the surgical area  · Temperature over 100.5  · Nausea and vomiting lasting longer than 4 hours or if unable to take medications  · Any signs of decreased circulation or nerve impairment to extremity: change in color, persistent  numbness, tingling, coldness or increase pain      · You will receive a Post Operative Call from one of the Recovery Room Nurses on the day after your surgery to check on you. It is very important for us to know how you are recovering after your surgery. If you have an issue or need to speak with someone, please call your surgeon, do not wait for the post operative call. · You may receive an e-mail or letter in the mail from [employer] regarding your experience with us in the Ambulatory Surgery Unit. Your feedback is valuable to us and we appreciate your participation in the survey. · If the above instructions are not adequate, please contact Leonard Rollins RN, [facility] anesthesia Nurse Manager or our Anesthesiologist, at [phone]. If you are having problems after your surgery, call the physician at his office number. · We wish you a speedy recovery ? What to do at Home:      *  Please give a list of your current medications to your Primary Care Provider. *  Please update this list whenever your medications are discontinued, doses are      changed, or new medications (including over-the-counter products) are added. *  Please carry medication information at all times in case of emergency situations.             These are general instructions for a healthy lifestyle:    No smoking/ No tobacco products/ Avoid exposure to second hand smoke    Surgeon General's Warning:  Quitting smoking now greatly reduces serious risk to your health. Obesity, smoking, and sedentary lifestyle greatly increases your risk for illness    A healthy diet, regular physical exercise & weight monitoring are important for maintaining a healthy lifestyle    You may be retaining fluid if you have a history of heart failure or if you experience any of the following symptoms:  Weight gain of 3 pounds or more overnight or 5 pounds in a week, increased swelling in our hands or feet or shortness of breath while lying flat in bed. Please call your doctor as soon as you notice any of these symptoms; do not wait until your next office visit. Recognize signs and symptoms of STROKE:    B - Balance  E - Eyes    F-  Face looks uneven    A-  Arms unable to move or move even    S-  Speech slurred or non-existent    T-  Time-call 911 as soon as signs and symptoms begin-DO NOT go       Back to bed or wait to see if you get better-TIME IS BRAIN. If you have not received your influenza and/or pneumococcal vaccine, please follow up with your primary care physician. The discharge information has been reviewed with the patient and caregiver. The patient and caregiver verbalized understanding.

## 2017-06-19 NOTE — IP AVS SNAPSHOT
[address] [facility] 
[phone] Patient: Ilsa Cuevas MRN: [id number] QLT:2/46/5940 You are allergic to the following Allergen Reactions Shellfish Derived Swelling Wheezing Recent Documentation Height Weight BMI OB Status Smoking Status 1.575 m 109.4 kg 44.1 kg/m2 Recent pregnancy Never Smoker Emergency Contacts Name Discharge Info Relation Home Work Mobile Sanjay Ambriz [1] Parent [1] [phone] About your hospitalization You were admitted on:  June 19, 2017 You last received care in the:  [facility] PACU You were discharged on:  June 19, 2017 Unit phone number:  [phone] Why you were hospitalized Your primary diagnosis was:  Not on File Providers Seen During Your Hospitalizations Provider Role Specialty Primary office phone France Monahan MD Attending Provider Obstetrics & Gynecology [phone] Your Primary Care Physician (PCP) Primary Care Physician Office Phone Office Fax [facility], [address] [place] [address] [phone] Follow-up Information Follow up With Details Comments Contact Info Jessica Zendejas MD   [address] [place] [address] [address] [facility] 
[phone] Current Discharge Medication List  
  
ASK your doctor about these medications Dose & Instructions Dispensing Information Comments Morning Noon Evening Bedtime  
 albuterol 90 mcg/actuation inhaler Commonly known as:  PROVENTIL HFA, VENTOLIN HFA, PROAIR HFA Your last dose was: Your next dose is: Take 1 Puff by inhalation every 4 hours as needed for Wheezing. Indications: BRONCHOSPASM PREVENTION Quantity:  1 Inhaler Refills:  1  
     
   
   
   
  
 cetirizine 10 mg tablet Commonly known as:  ZYRTEC Your last dose was:     
   
Your next dose is:    
   
   
 Dose:  10 mg  
 Take 1 Tab by mouth daily. Quantity:  30 Tab Refills:  4 Cholecalciferol (Vitamin D3) 2,000 unit Cap capsule Commonly known as:  VITAMIN D3 Your last dose was: Your next dose is:    
   
   
 Dose:  2000 Units Take 2,000 Units by mouth daily. Indications: VITAMIN D DEFICIENCY (HIGH DOSE THERAPY) Quantity:  90 Cap Refills:  5  
     
   
   
   
  
 colestipol 1 gram tablet Commonly known as:  COLESTID Your last dose was: Your next dose is:    
   
   
 Dose:  2 g Take 2 g by mouth daily. Refills:  0  
     
   
   
   
  
 hydroCHLOROthiazide 25 mg tablet Commonly known as:  HYDRODIURIL Your last dose was: Your next dose is:    
   
   
 Dose:  25 mg Take 1 Tab by mouth daily. Indications: Edema, hypertension Quantity:  30 Tab Refills:  5  
     
   
   
   
  
 multivitamin with iron tablet Your last dose was: Your next dose is:    
   
   
 Dose:  1 Tab Take 1 Tab by mouth daily. Refills:  0 Discharge Instructions PATIENT DISCHARGE INSTRUCTIONS PATIENT DISCHARGE INSTRUCTIONS Lacey Pennie / [id number] : 1990 Admitted 2017 Discharged: 2017 · It is important that you take the medication exactly as they are prescribed. · Keep your medication in the bottles provided by the pharmacist and keep a list of the medication names, dosages, and times to be taken in your wallet. · Do not take other medications without consulting your doctor. What to do at [facility] Recommended Diet: Regular Diet Recommended Activity: Activity as tolerated and no driving for today If you experience any of the following symptoms fever>101.4, please call [phone] Signed By: Sadia Hall MD   
 2017 Post-Operative Instructions GYN For your own safety, a responsible adult must drive you home. Someone responsible should stay with you for the first 24 hours after surgery. If you had medication or general anesthesia, it is normal to feel drowsy and weak, therefore, it is not recommended that you drive, stand or walk without help, operate machinery, drink alcoholic beverages or eat heavy meals (due to nausea), make important personal or business decisions or sign important papers, children should not ride bicycles, skateboards, etc.  Please do not climb stairs or shower unattended for at least 24 hours. If your physician finds it necessary for you to have to take home medications, please obtain from the pharmacy of your choice. Notify your Physician: 
Notify your physician, if you begin to show signs of infection such as swelling, heat, redness, foul smelling discharge or red streaks, pus formation, temperature of 100.5 or greater,  any other disruption of your surgical site or excessive bleeding. Care of dressings: 
Vaginal bleeding should become less each day and last no more than 7 days. Change your ross-pad frequently and continue with ross-care for as long as there is discharge. Diet: 
Please try clear fluids and crackers and then wait about 30 minutes, if you have no nausea, you may eat normally. Avoid: Avoid sexual intercourse, tampons or vaginal douches for two (2) weeks.  
 
  
 
 
>>>You received Toradol during your surgery. You may not take any form of NSAIDS (non steroidal anti inflammatory drugs) such as Advil, Ibuprofen, Aleve, Motrin until 2:00pm.<<< 
 
 
DO NOT TAKE TYLENOL/ACETAMINOPHEN WITH Tylenol #3. TAKE NARCOTIC PAIN MEDICATIONS WITH FOOD Narcotics tend to be constipating, we suggest taking a stool softener such as Colace or Miralax (follow package instructions). DO NOT DRIVE WHILE TAKING NARCOTIC PAIN MEDICATIONS. DO NOT TAKE SLEEPING MEDICATIONS OR ANTIANXIETY MEDICATIONS WHILE TAKING NARCOTIC PAIN MEDICATIONS,  ESPECIALLY THE NIGHT OF ANESTHESIA. CPAP PATIENTS BE SURE TO WEAR MACHINE WHENEVER NAPPING OR SLEEPING. DISCHARGE SUMMARY from Nurse The following personal items collected during your admission are returned to you:  
Dental Appliance: Dental Appliances: None Vision: Visual Aid: Glasses RETURNED TO PT Hearing Aid:   
Jewelry:   
Clothing: Clothing:  (clothing under stretcher, purse to boyfriend, glasses to recovery) Other Valuables:   
Valuables sent to safe:   
 
 
PATIENT INSTRUCTIONS: 
 
 
B - Balance E - Eyes F-  Face looks uneven A-  Arms unable to move or move even S-  Speech slurred or non-existent T-  Time-call 911 as soon as signs and symptoms begin-DO NOT go Back to bed or wait to see if you get better-TIME IS BRAIN. If you have not received your influenza and/or pneumococcal vaccine, please follow up with your primary care physician. The discharge information has been reviewed with the patient and caregiver. The patient and caregiver verbalized understanding. Discharge Instructions Attachments/References MEFS - ACETAMINOPHEN/CODEINE (TYLENOL WITH CODEINE NO. 3, TYLENOL W/CODEINE #4, TYLENOL WITH CODEINE NO. 4, TYLENOL W/CODEINE #3) - (BY MOUTH) (ENGLIS. .. MEFS - DOXYCYCLINE (ACTICLATE, ADOXA, AVIDOXY, MONODOX) - (BY MOUTH) (ENGLISH) Discharge Orders None Introducing [facility]! Rafi Washington introduces Strava patient portal. Now you can access parts of your medical record, email your doctor's office, and request medication refills online. 1. In your internet browser, go to [email or web address] 2. Click on the First Time User? Click Here link in the Sign In box. You will see the New Member Sign Up page. 3. Enter your Strava Access Code exactly as it appears below. You will not need to use this code after youve completed the sign-up process. If you do not sign up before the expiration date, you must request a new code. · The [employer] Access Code: [facility] Expires: 7/27/2017  9:02 AM 
 
4. Enter the last four digits of your Social Security Number (xxxx) and Date of Birth (mm/dd/yyyy) as indicated and click Submit. You will be taken to the next sign-up page. 5. Create a InExchanget ID. This will be your Strava login ID and cannot be changed, so think of one that is secure and easy to remember. 6. Create a InExchanget password. You can change your password at any time. 7. Enter your Password Reset Question and Answer. This can be used at a later time if you forget your password. 8. Enter your e-mail address. You will receive e-mail notification when new information is available in [address] 9. Click Sign Up. You can now view and download portions of your medical record. 10. Click the Download Summary menu link to download a portable copy of your medical information. If you have questions, please visit the Frequently Asked Questions section of the [employer] website. Remember, [employer] is NOT to be used for urgent needs. For medical emergencies, dial 911. Now available from your iPhone and Android! General Information Please provide this summary of care documentation to your next provider. Patient Signature:  ____________________________________________________________ Date:  ____________________________________________________________  
  
Kendrick Alvarez Provider Signature:  ____________________________________________________________ Date:  ____________________________________________________________ More Information Acetaminophen/Codeine (Tylenol with Codeine No. 3, Tylenol w/Codeine #4, Tylenol With Codeine No. 4, Tylenol w/Codeine #3) - (By mouth) Why this medicine is used:  
Treats mild to moderately severe pain. This medicine contains a narcotic pain reliever. Contact a nurse or doctor right away if you have: 
· Extreme weakness, shallow breathing, slow heartbeat · Extreme drowsiness or confusion · Sweating or cold, clammy skin · Dark urine or pale stools, nausea, vomiting, loss of appetite, stomach pain · Yellow skin or eyes Common side effects: 
· Constipation · Nausea, vomiting · Tiredness © 2017 [address] Information is for End User's use only and may not be sold, redistributed or otherwise used for commercial purposes. Doxycycline (Acticlate, Adoxa, Avidoxy, Monodox) - (By mouth) Why this medicine is used:  
Treats and prevents infections, treats rosacea, or severe acne. Contact a nurse or doctor right away if you have: · Blistering, peeling, red skin rash · Severe or bloody diarrhea · Severe headache, dizziness, vision changes · Burning, pain, or irritation in your upper stomach or throat Common side effects: · Discoloration of teeth in children © 2017 [facility] Information is for End User's use only and may not be sold, redistributed or otherwise used for commercial purposes.

## 2017-06-19 NOTE — H&P
Gynecology History and Physical    Name: Jose Daniel Mcmanus MRN: [id number] SSN: [id number]    YOB: 1990  Age: 27 y.o. Sex: female       Subjective:      Chief complaint:  Missed     Angelique Jackson is a 27 y.o.  female with a history of vaginal bleeding. Previous workup included Ultrasound which revealed missed ab. Previous treatment measures included none. She is admitted for Procedure(s) (LRB):  SUCTION DILATATION AND CURETTAGE  (N/A). The current method of family planning is none. OB History     No data available        Past Medical History:   Diagnosis Date    Asthma     exercise / cold induced doesn't use inhaler    Interstitial cystitis      Past Surgical History:   Procedure Laterality Date    HX CHOLECYSTECTOMY       Social History     Occupational History    Not on file. Social History Main Topics    Smoking status: Never Smoker    Smokeless tobacco: Never Used    Alcohol use No    Drug use: No    Sexual activity: Yes     Partners: Male     Birth control/ protection: IUD     Family History   Problem Relation Age of Onset    Hypertension Mother     Diabetes Mother     Diabetes Father     Hypertension Father     No Known Problems Sister     Cancer Brother         Allergies   Allergen Reactions    Shellfish Derived Swelling     Wheezing     Prior to Admission medications    Medication Sig Start Date End Date Taking? Authorizing Provider   colestipol (COLESTID) 1 gram tablet Take 2 g by mouth daily. Yes Historical Provider   Cholecalciferol, Vitamin D3, (VITAMIN D3) 2,000 unit cap capsule Take 2,000 Units by mouth daily. Indications: VITAMIN D DEFICIENCY (HIGH DOSE THERAPY) 17  Yes Jeannette Villasenor MD   cetirizine (ZYRTEC) 10 mg tablet Take 1 Tab by mouth daily. 17  Yes Jeannette Villasenor MD   multivitamin with iron tablet Take 1 Tab by mouth daily. Yes Historical Provider   hydroCHLOROthiazide (HYDRODIURIL) 25 mg tablet Take 1 Tab by mouth daily.  Indications: Edema, hypertension 17   Jenine Snellen, MD   albuterol (PROVENTIL HFA, VENTOLIN HFA, PROAIR HFA) 90 mcg/actuation inhaler Take 1 Puff by inhalation every 4 hours as needed for Wheezing. Indications: BRONCHOSPASM PREVENTION 17   Jenine Snellen, MD        Review of Systems:  A comprehensive review of systems was negative except for that written in the History of Present Illness. Objective:     Vitals:    17 1057 17 0627   BP:  126/72   Pulse:  87   Resp:  18   Temp:  98.6 °F (37 °C)   SpO2:  99%   Weight: 108.4 kg (239 lb) 109.4 kg (241 lb 2 oz)   Height: 5' 2\" (1.575 m)        Physical Exam:  Patient without distress. Assessment:     Active Problems:    * No active hospital problems. *     Missed  with 8 weeks gestation    Plan:     Procedure(s) (LRB):  SUCTION DILATATION AND CURETTAGE  (N/A)  Discussed the risks of surgery including the risks of bleeding, infection, deep vein thrombosis, and surgical injuries to internal organs including but not limited to the bowels, bladder, rectum, and female reproductive organs. The patient understands the risks; any and all questions were answered to the patient's satisfaction.     Signed By:  Nery Blackmon MD     2017

## 2017-06-19 NOTE — PERIOP NOTES
Patient: Diego Hale MRN: [id number]  SSN: [id number]   YOB: 1990  Age: 27 y.o. Sex: female     Patient is status post Procedure(s):  SUCTION DILATATION AND CURETTAGE . Surgeon(s) and Role:     * Carlene Hernandez MD - Primary    Local/Dose/Irrigation:  SEE MAR                  Peripheral IV 06/19/17 Right Hand (Active)   Site Assessment Clean, dry, & intact 6/19/2017  6:43 AM   Phlebitis Assessment 0 6/19/2017  6:43 AM   Infiltration Assessment 0 6/19/2017  6:43 AM   Dressing Status Clean, dry, & intact 6/19/2017  6:43 AM   Dressing Type Tape;Transparent 6/19/2017  6:43 AM   Hub Color/Line Status Blue; Infusing 6/19/2017  6:43 AM                           Dressing/Packing:  Wound Perineum-DRESSING TYPE: Justine-pad (06/19/17 0700)  Splint/Cast:  ]    Other:  PATIENT STRAIGHT CATHED BY SURGEON, URINE OUTPUT 30ML, PER SURGEON.

## 2017-06-19 NOTE — ANESTHESIA POSTPROCEDURE EVALUATION
Post-Anesthesia Evaluation and Assessment    Patient: Jasmin Francisco MRN: [id number]  SSN: [id number]    YOB: 1990  Age: 27 y.o. Sex: female       Cardiovascular Function/Vital Signs  Visit Vitals    /72    Pulse 66    Temp 36.8 °C (98.3 °F)    Resp 12    Ht 5' 2\" (1.575 m)    Wt 109.4 kg (241 lb 2 oz)    SpO2 100%    BMI 44.1 kg/m2       Patient is status post general anesthesia for Procedure(s):  SUCTION DILATATION AND CURETTAGE . Nausea/Vomiting: None    Postoperative hydration reviewed and adequate. Pain:  Pain Scale 1: Numeric (0 - 10) (06/19/17 0830)  Pain Intensity 1: 0 (06/19/17 0830)   Managed    Neurological Status:   Neuro (WDL): Exceptions to WDL (06/19/17 0803)  Neuro  Neurologic State: Alert (06/19/17 0830)  LUE Motor Response: Purposeful (06/19/17 0830)  LLE Motor Response: Purposeful (06/19/17 0830)  RUE Motor Response: Purposeful (06/19/17 0830)  RLE Motor Response: Purposeful (06/19/17 0830)   At baseline    Mental Status and Level of Consciousness: Arousable    Pulmonary Status:   O2 Device: Room air (06/19/17 9347)   Adequate oxygenation and airway patent    Complications related to anesthesia: None    Post-anesthesia assessment completed.  No concerns    Signed By: Valeria Garcia MD     June 19, 2017

## 2017-06-19 NOTE — PERIOP NOTES
Nghia Brionesyusef  1990  [id number]    Situation:  Verbal report given from: JOSUE Vieyra RN and Jon ERICKSON  Procedure: Procedure(s):  SUCTION DILATATION AND CURETTAGE     Background:    Preoperative diagnosis: MISSED AB    Postoperative diagnosis: MISSED AB    :  Dr. Siva Cortez    Assistant(s): Circ-1: Brianna Marin RN  Scrub Tech-1: Ish Rivas    Specimens:   ID Type Source Tests Collected by Time Destination   1 : FAILED PRODUCTS OF CONCEPTION Fresh Uterus  Rehana Sharma MD 6/19/2017 0750 Pathology       Assessment:  Intra-procedure medications         Anesthesia gave intra-procedure sedation and medications, see anesthesia flow sheet     Intravenous fluids: LR@ KVO     Vital signs stable       Recommendation:    Permission to share finding with family or friend yes    0836Discharged to home via/wc,accompanied to car per RN. Skin warm and dry, awake and alert. Respirations even, unlabored. Pt and family members questions and concerns addressed prior to discharge. All belongings (clothing/glasses) with pt.

## 2017-06-19 NOTE — ANESTHESIA PREPROCEDURE EVALUATION
Anesthetic History   No history of anesthetic complications            Review of Systems / Medical History  Patient summary reviewed, nursing notes reviewed and pertinent labs reviewed    Pulmonary            Asthma (exercise-induced; no inhaler)        Neuro/Psych   Within defined limits           Cardiovascular  Within defined limits                Exercise tolerance: >4 METS     GI/Hepatic/Renal     GERD (occasional only)           Endo/Other  Within defined limits           Other Findings              Physical Exam    Airway  Mallampati: I  TM Distance: > 6 cm  Neck ROM: normal range of motion   Mouth opening: Normal     Cardiovascular    Rhythm: regular  Rate: normal      Pertinent negatives: No murmur   Dental  No notable dental hx       Pulmonary  Breath sounds clear to auscultation               Abdominal  GI exam deferred       Other Findings            Anesthetic Plan    ASA: 2  Anesthesia type: general and total IV anesthesia          Induction: Intravenous  Anesthetic plan and risks discussed with: Patient

## 2017-10-18 ENCOUNTER — OFFICE VISIT (OUTPATIENT)
Dept: FAMILY MEDICINE CLINIC | Age: 27
End: 2017-10-18

## 2017-10-18 VITALS
OXYGEN SATURATION: 99 % | RESPIRATION RATE: 20 BRPM | DIASTOLIC BLOOD PRESSURE: 82 MMHG | TEMPERATURE: 98.4 F | SYSTOLIC BLOOD PRESSURE: 118 MMHG | WEIGHT: 234 LBS | HEART RATE: 91 BPM | BODY MASS INDEX: 43.06 KG/M2 | HEIGHT: 62 IN

## 2017-10-18 DIAGNOSIS — J02.9 PHARYNGITIS, UNSPECIFIED ETIOLOGY: ICD-10-CM

## 2017-10-18 DIAGNOSIS — J40 BRONCHITIS: Primary | ICD-10-CM

## 2017-10-18 DIAGNOSIS — J32.9 SINUSITIS, UNSPECIFIED CHRONICITY, UNSPECIFIED LOCATION: ICD-10-CM

## 2017-10-18 LAB
S PYO AG THROAT QL: NEGATIVE
VALID INTERNAL CONTROL?: YES

## 2017-10-18 RX ORDER — BENZONATATE 200 MG/1
200 CAPSULE ORAL
Qty: 30 CAP | Refills: 0 | Status: SHIPPED | OUTPATIENT
Start: 2017-10-18 | End: 2017-10-24 | Stop reason: ALTCHOICE

## 2017-10-18 RX ORDER — AMOXICILLIN AND CLAVULANATE POTASSIUM 875; 125 MG/1; MG/1
1 TABLET, FILM COATED ORAL 2 TIMES DAILY
Qty: 20 TAB | Refills: 0 | Status: SHIPPED | OUTPATIENT
Start: 2017-10-18 | End: 2017-10-28

## 2017-10-18 NOTE — PATIENT INSTRUCTIONS
Bronchitis: Care Instructions  Your Care Instructions    Bronchitis is inflammation of the bronchial tubes, which carry air to the lungs. The tubes swell and produce mucus, or phlegm. The mucus and inflamed bronchial tubes make you cough. You may have trouble breathing. Most cases of bronchitis are caused by viruses like those that cause colds. Antibiotics usually do not help and they may be harmful. Bronchitis usually develops rapidly and lasts about 2 to 3 weeks in otherwise healthy people. Follow-up care is a key part of your treatment and safety. Be sure to make and go to all appointments, and call your doctor if you are having problems. It's also a good idea to know your test results and keep a list of the medicines you take. How can you care for yourself at home? · Take all medicines exactly as prescribed. Call your doctor if you think you are having a problem with your medicine. · Get some extra rest.  · Take an over-the-counter pain medicine, such as acetaminophen (Tylenol), ibuprofen (Advil, Motrin), or naproxen (Aleve) to reduce fever and relieve body aches. Read and follow all instructions on the label. · Do not take two or more pain medicines at the same time unless the doctor told you to. Many pain medicines have acetaminophen, which is Tylenol. Too much acetaminophen (Tylenol) can be harmful. · Take an over-the-counter cough medicine that contains dextromethorphan to help quiet a dry, hacking cough so that you can sleep. Avoid cough medicines that have more than one active ingredient. Read and follow all instructions on the label. · Breathe moist air from a humidifier, hot shower, or sink filled with hot water. The heat and moisture will thin mucus so you can cough it out. · Do not smoke. Smoking can make bronchitis worse. If you need help quitting, talk to your doctor about stop-smoking programs and medicines. These can increase your chances of quitting for good.   When should you call for help? Call 911 anytime you think you may need emergency care. For example, call if:  · You have severe trouble breathing. Call your doctor now or seek immediate medical care if:  · You have new or worse trouble breathing. · You cough up dark brown or bloody mucus (sputum). · You have a new or higher fever. · You have a new rash. Watch closely for changes in your health, and be sure to contact your doctor if:  · You cough more deeply or more often, especially if you notice more mucus or a change in the color of your mucus. · You are not getting better as expected. Where can you learn more? Go to http://jamin-neisha.info/. Enter H333 in the search box to learn more about \"Bronchitis: Care Instructions. \"  Current as of: March 25, 2017  Content Version: 11.3  © 8521-7580 [employer]. Care instructions adapted under license by [employer] (which disclaims liability or warranty for this information). If you have questions about a medical condition or this instruction, always ask your healthcare professional. [place] [address] any warranty or liability for your use of this information. Sinusitis: Care Instructions  Your Care Instructions    Sinusitis is an infection of the lining of the sinus cavities in your head. Sinusitis often follows a cold. It causes pain and pressure in your head and face. In most cases, sinusitis gets better on its own in 1 to 2 weeks. But some mild symptoms may last for several weeks. Sometimes antibiotics are needed. Follow-up care is a key part of your treatment and safety. Be sure to make and go to all appointments, and call your doctor if you are having problems. It's also a good idea to know your test results and keep a list of the medicines you take. How can you care for yourself at home? · Take an over-the-counter pain medicine, such as acetaminophen (Tylenol), ibuprofen (Advil, Motrin), or naproxen (Aleve). Read and follow all instructions on the label. · If the doctor prescribed antibiotics, take them as directed. Do not stop taking them just because you feel better. You need to take the full course of antibiotics. · Be careful when taking over-the-counter cold or flu medicines and Tylenol at the same time. Many of these medicines have acetaminophen, which is Tylenol. Read the labels to make sure that you are not taking more than the recommended dose. Too much acetaminophen (Tylenol) can be harmful. · Breathe warm, moist air from a steamy shower, a hot bath, or a sink filled with hot water. Avoid cold, dry air. Using a humidifier in your home may help. Follow the directions for cleaning the machine. · Use saline (saltwater) nasal washes to help keep your nasal passages open and wash out mucus and bacteria. You can buy saline nose drops at a grocery store or drugstore. Or you can make your own at home by adding 1 teaspoon of salt and 1 teaspoon of baking soda to 2 cups of distilled water. If you make your own, fill a bulb syringe with the solution, insert the tip into your nostril, and squeeze gently. Nidia Pickup your nose. · Put a hot, wet towel or a warm gel pack on your face 3 or 4 times a day for 5 to 10 minutes each time. · Try a decongestant nasal spray like oxymetazoline (Afrin). Do not use it for more than 3 days in a row. Using it for more than 3 days can make your congestion worse. When should you call for help? Call your doctor now or seek immediate medical care if:  · You have new or worse swelling or redness in your face or around your eyes. · You have a new or higher fever. Watch closely for changes in your health, and be sure to contact your doctor if:  · You have new or worse facial pain. · The mucus from your nose becomes thicker (like pus) or has new blood in it. · You are not getting better as expected. Where can you learn more? Go to http://jamin-neisha.info/.   Enter L286 in the search box to learn more about \"Sinusitis: Care Instructions. \"  Current as of: July 29, 2016  Content Version: 11.3  © 9306-8491 [employer]. Care instructions adapted under license by [employer] (which disclaims liability or warranty for this information). If you have questions about a medical condition or this instruction, always ask your healthcare professional. [address] any warranty or liability for your use of this information. Sore Throat: Care Instructions  Your Care Instructions    Infection by bacteria or a virus causes most sore throats. Cigarette smoke, dry air, air pollution, allergies, and yelling can also cause a sore throat. Sore throats can be painful and annoying. Fortunately, most sore throats go away on their own. If you have a bacterial infection, your doctor may prescribe antibiotics. Follow-up care is a key part of your treatment and safety. Be sure to make and go to all appointments, and call your doctor if you are having problems. It's also a good idea to know your test results and keep a list of the medicines you take. How can you care for yourself at home? · If your doctor prescribed antibiotics, take them as directed. Do not stop taking them just because you feel better. You need to take the full course of antibiotics. · Gargle with warm salt water once an hour to help reduce swelling and relieve discomfort. Use 1 teaspoon of salt mixed in 1 cup of warm water. · Take an over-the-counter pain medicine, such as acetaminophen (Tylenol), ibuprofen (Advil, Motrin), or naproxen (Aleve). Read and follow all instructions on the label. · Be careful when taking over-the-counter cold or flu medicines and Tylenol at the same time. Many of these medicines have acetaminophen, which is Tylenol. Read the labels to make sure that you are not taking more than the recommended dose. Too much acetaminophen (Tylenol) can be harmful.   · Drink plenty of fluids. Fluids may help soothe an irritated throat. Hot fluids, such as tea or soup, may help decrease throat pain. · Use over-the-counter throat lozenges to soothe pain. Regular cough drops or hard candy may also help. These should not be given to young children because of the risk of choking. · Do not smoke or allow others to smoke around you. If you need help quitting, talk to your doctor about stop-smoking programs and medicines. These can increase your chances of quitting for good. · Use a vaporizer or humidifier to add moisture to your bedroom. Follow the directions for cleaning the machine. When should you call for help? Call your doctor now or seek immediate medical care if:  · You have new or worse trouble swallowing. · Your sore throat gets much worse on one side. Watch closely for changes in your health, and be sure to contact your doctor if you do not get better as expected. Where can you learn more? Go to http://jamin-neisha.info/. Enter [id number] [id number] 80 [id number] in the search box to learn more about \"Sore Throat: Care Instructions. \"  Current as of: July 29, 2016  Content Version: 11.3  © 2497-0101 [employer]. Care instructions adapted under license by [employer] (which disclaims liability or warranty for this information). If you have questions about a medical condition or this instruction, always ask your healthcare professional. [place] [address] any warranty or liability for your use of this information.

## 2017-10-18 NOTE — MR AVS SNAPSHOT
Visit Information Date & Time Provider Department Dept. Phone Encounter #  
 10/18/2017 10:00 AM [facility] [phone] [id number] Follow-up Instructions Return if symptoms worsen or fail to improve. Upcoming Health Maintenance Date Due Pneumococcal 19-64 Medium Risk (1 of 1 - PPSV23) 8/30/2007 DTaP/Tdap/Td series (1 - Tdap) 8/30/2009 PAP AKA CERVICAL CYTOLOGY 6/1/2018 Allergies as of 10/18/2017  Review Complete On: 10/18/2017 By: Cathleen Vallecillo, LPN Severity Noted Reaction Type Reactions Shellfish Derived High 07/22/2015    Swelling Wheezing Current Immunizations  Never Reviewed Name Date Hep B Vaccine 11/29/2000, 6/12/2000, 5/8/2000 Hib 3/3/1992 Influenza Vaccine (Quad) PF 10/7/2016 MMR 9/13/1993, 3/27/1990 Poliovirus vaccine 9/13/1993, 3/3/1992, 5/31/1989, 1/19/1989 Not reviewed this visit You Were Diagnosed With   
  
 Codes Comments Bronchitis    -  Primary ICD-10-CM: R85 ICD-9-CM: 128 Sinusitis, unspecified chronicity, unspecified location     ICD-10-CM: J32.9 ICD-9-CM: 473.9 Pharyngitis, unspecified etiology     ICD-10-CM: J02.9 ICD-9-CM: 993 Vitals BP Pulse Temp Resp Height(growth percentile) Weight(growth percentile) 118/82 91 98.4 °F (36.9 °C) (Oral) 20 5' 2\" (1.575 m) 234 lb (106.1 kg) LMP SpO2 BMI OB Status Smoking Status 09/28/2017 99% 42.8 kg/m2 Recent pregnancy Never Smoker Vitals History BMI and BSA Data Body Mass Index Body Surface Area  
 42.8 kg/m 2 2.15 m 2 Preferred Pharmacy Pharmacy Name Phone [facility] [address], [address] [place] [address] Drive [phone] Your Updated Medication List  
  
   
This list is accurate as of: 10/18/17 11:04 AM.  Always use your most recent med list.  
  
  
  
  
 albuterol 90 mcg/actuation inhaler Commonly known as:  PROVENTIL HFA, VENTOLIN HFA, PROAIR HFA Take 1 Puff by inhalation every 4 hours as needed for Wheezing. Indications: BRONCHOSPASM PREVENTION  
  
 amoxicillin-clavulanate 875-125 mg per tablet Commonly known as:  AUGMENTIN Take 1 Tab by mouth two (2) times a day for 10 days. benzonatate 200 mg capsule Commonly known as:  TESSALON Take 1 Cap by mouth three (3) times daily as needed for Cough for up to 7 days. cetirizine 10 mg tablet Commonly known as:  ZYRTEC Take 1 Tab by mouth daily. Cholecalciferol (Vitamin D3) 2,000 unit Cap capsule Commonly known as:  VITAMIN D3 Take 2,000 Units by mouth daily. Indications: VITAMIN D DEFICIENCY (HIGH DOSE THERAPY)  
  
 colestipol 1 gram tablet Commonly known as:  COLESTID Take 2 g by mouth daily. hydroCHLOROthiazide 25 mg tablet Commonly known as:  HYDRODIURIL Take 1 Tab by mouth daily. Indications: Edema, hypertension  
  
 multivitamin with iron tablet Take 1 Tab by mouth daily. Prescriptions Sent to Pharmacy Refills  
 amoxicillin-clavulanate (AUGMENTIN) 875-125 mg per tablet 0 Sig: Take 1 Tab by mouth two (2) times a day for 10 days. Class: Normal  
 Pharmacy: [facility] [address] [place] [address] Ph #: [phone] Route: Oral  
 benzonatate (TESSALON) 200 mg capsule 0 Sig: Take 1 Cap by mouth three (3) times daily as needed for Cough for up to 7 days. Class: Normal  
 Pharmacy: [facility] [address] [place] [address] Ph #: [phone] Route: Oral  
  
We Performed the Following AMB POC RAPID STREP A [67785 CPT(R)] Follow-up Instructions Return if symptoms worsen or fail to improve. Patient Instructions Bronchitis: Care Instructions Your Care Instructions Bronchitis is inflammation of the bronchial tubes, which carry air to the lungs. The tubes swell and produce mucus, or phlegm. The mucus and inflamed bronchial tubes make you cough. You may have trouble breathing. Most cases of bronchitis are caused by viruses like those that cause colds. Antibiotics usually do not help and they may be harmful. Bronchitis usually develops rapidly and lasts about 2 to 3 weeks in otherwise healthy people. Follow-up care is a key part of your treatment and safety. Be sure to make and go to all appointments, and call your doctor if you are having problems. It's also a good idea to know your test results and keep a list of the medicines you take. How can you care for yourself at home? · Take all medicines exactly as prescribed. Call your doctor if you think you are having a problem with your medicine. · Get some extra rest. 
· Take an over-the-counter pain medicine, such as acetaminophen (Tylenol), ibuprofen (Advil, Motrin), or naproxen (Aleve) to reduce fever and relieve body aches. Read and follow all instructions on the label. · Do not take two or more pain medicines at the same time unless the doctor told you to. Many pain medicines have acetaminophen, which is Tylenol. Too much acetaminophen (Tylenol) can be harmful. · Take an over-the-counter cough medicine that contains dextromethorphan to help quiet a dry, hacking cough so that you can sleep. Avoid cough medicines that have more than one active ingredient. Read and follow all instructions on the label. · Breathe moist air from a humidifier, hot shower, or sink filled with hot water. The heat and moisture will thin mucus so you can cough it out. · Do not smoke. Smoking can make bronchitis worse. If you need help quitting, talk to your doctor about stop-smoking programs and medicines. These can increase your chances of quitting for good. When should you call for help? Call 911 anytime you think you may need emergency care. For example, call if: 
· You have severe trouble breathing. Call your doctor now or seek immediate medical care if: 
· You have new or worse trouble breathing. · You cough up dark brown or bloody mucus (sputum). · You have a new or higher fever. · You have a new rash. Watch closely for changes in your health, and be sure to contact your doctor if: 
· You cough more deeply or more often, especially if you notice more mucus or a change in the color of your mucus. · You are not getting better as expected. Where can you learn more? Go to http://jamin-neisha.info/. Enter H333 in the search box to learn more about \"Bronchitis: Care Instructions. \" Current as of: March 25, 2017 Content Version: 11.3 © 7534-6388 [employer]Â®. Care instructions adapted under license by [employer] (which disclaims liability or warranty for this information). If you have questions about a medical condition or this instruction, always ask your healthcare professional. [place] [address] any warranty or liability for your use of this information. Sinusitis: Care Instructions Your Care Instructions Sinusitis is an infection of the lining of the sinus cavities in your head. Sinusitis often follows a cold. It causes pain and pressure in your head and face. In most cases, sinusitis gets better on its own in 1 to 2 weeks. But some mild symptoms may last for several weeks. Sometimes antibiotics are needed. Follow-up care is a key part of your treatment and safety. Be sure to make and go to all appointments, and call your doctor if you are having problems. It's also a good idea to know your test results and keep a list of the medicines you take. How can you care for yourself at home?  
· Take an over-the-counter pain medicine, such as acetaminophen (Tylenol), ibuprofen (Advil, Motrin), or naproxen (Aleve). Read and follow all instructions on the label. · If the doctor prescribed antibiotics, take them as directed. Do not stop taking them just because you feel better. You need to take the full course of antibiotics. · Be careful when taking over-the-counter cold or flu medicines and Tylenol at the same time. Many of these medicines have acetaminophen, which is Tylenol. Read the labels to make sure that you are not taking more than the recommended dose. Too much acetaminophen (Tylenol) can be harmful. · Breathe warm, moist air from a steamy shower, a hot bath, or a sink filled with hot water. Avoid cold, dry air. Using a humidifier in your home may help. Follow the directions for cleaning the machine. · Use saline (saltwater) nasal washes to help keep your nasal passages open and wash out mucus and bacteria. You can buy saline nose drops at a grocery store or drugstore. Or you can make your own at home by adding 1 teaspoon of salt and 1 teaspoon of baking soda to 2 cups of distilled water. If you make your own, fill a bulb syringe with the solution, insert the tip into your nostril, and squeeze gently. Willia Slocumb your nose. · Put a hot, wet towel or a warm gel pack on your face 3 or 4 times a day for 5 to 10 minutes each time. · Try a decongestant nasal spray like oxymetazoline (Afrin). Do not use it for more than 3 days in a row. Using it for more than 3 days can make your congestion worse. When should you call for help? Call your doctor now or seek immediate medical care if: 
· You have new or worse swelling or redness in your face or around your eyes. · You have a new or higher fever. Watch closely for changes in your health, and be sure to contact your doctor if: 
· You have new or worse facial pain. · The mucus from your nose becomes thicker (like pus) or has new blood in it. · You are not getting better as expected. Where can you learn more? Go to http://jamin-neisha.info/. Enter Y345 in the search box to learn more about \"Sinusitis: Care Instructions. \" Current as of: July 29, 2016 Content Version: 11.3 © 6106-9571 [employer]. Care instructions adapted under license by [employer] (which disclaims liability or warranty for this information). If you have questions about a medical condition or this instruction, always ask your healthcare professional. [facility] any warranty or liability for your use of this information. Sore Throat: Care Instructions Your Care Instructions Infection by bacteria or a virus causes most sore throats. Cigarette smoke, dry air, air pollution, allergies, and yelling can also cause a sore throat. Sore throats can be painful and annoying. Fortunately, most sore throats go away on their own. If you have a bacterial infection, your doctor may prescribe antibiotics. Follow-up care is a key part of your treatment and safety. Be sure to make and go to all appointments, and call your doctor if you are having problems. It's also a good idea to know your test results and keep a list of the medicines you take. How can you care for yourself at home? · If your doctor prescribed antibiotics, take them as directed. Do not stop taking them just because you feel better. You need to take the full course of antibiotics. · Gargle with warm salt water once an hour to help reduce swelling and relieve discomfort. Use 1 teaspoon of salt mixed in 1 cup of warm water. · Take an over-the-counter pain medicine, such as acetaminophen (Tylenol), ibuprofen (Advil, Motrin), or naproxen (Aleve). Read and follow all instructions on the label. · Be careful when taking over-the-counter cold or flu medicines and Tylenol at the same time. Many of these medicines have acetaminophen, which is Tylenol.  Read the labels to make sure that you are not taking more than the recommended dose. Too much acetaminophen (Tylenol) can be harmful. · Drink plenty of fluids. Fluids may help soothe an irritated throat. Hot fluids, such as tea or soup, may help decrease throat pain. · Use over-the-counter throat lozenges to soothe pain. Regular cough drops or hard candy may also help. These should not be given to young children because of the risk of choking. · Do not smoke or allow others to smoke around you. If you need help quitting, talk to your doctor about stop-smoking programs and medicines. These can increase your chances of quitting for good. · Use a vaporizer or humidifier to add moisture to your bedroom. Follow the directions for cleaning the machine. When should you call for help? Call your doctor now or seek immediate medical care if: 
· You have new or worse trouble swallowing. · Your sore throat gets much worse on one side. Watch closely for changes in your health, and be sure to contact your doctor if you do not get better as expected. Where can you learn more? Go to http://jamin-neisha.info/. Enter [id number] [id number] [phone] in the search box to learn more about \"Sore Throat: Care Instructions. \" Current as of: July 29, 2016 Content Version: 11.3 © 4557-2452 [employer]. Care instructions adapted under license by [employer] (which disclaims liability or warranty for this information). If you have questions about a medical condition or this instruction, always ask your healthcare professional. [place] [address] any warranty or liability for your use of this information. Introducing [facility]! Sparkle Covarrubias introduces [facility] patient portal. Now you can access parts of your medical record, email your doctor's office, and request medication refills online. 1. In your internet browser, go to [email or web address] 2. Click on the First Time User? Click Here link in the Sign In box.  You will see the New Member Sign Up page. 3. Enter your textPlus Access Code exactly as it appears below. You will not need to use this code after youve completed the sign-up process. If you do not sign up before the expiration date, you must request a new code. · textPlus Access Code: [email or web address] Expires: 1/16/2018 11:04 AM 
 
4. Enter the last four digits of your Social Security Number (xxxx) and Date of Birth (mm/dd/yyyy) as indicated and click Submit. You will be taken to the next sign-up page. 5. Create a textPlus ID. This will be your textPlus login ID and cannot be changed, so think of one that is secure and easy to remember. 6. Create a textPlus password. You can change your password at any time. 7. Enter your Password Reset Question and Answer. This can be used at a later time if you forget your password. 8. Enter your e-mail address. You will receive e-mail notification when new information is available in [address]. 9. Click Sign Up. You can now view and download portions of your medical record. 10. Click the Download Summary menu link to download a portable copy of your medical information. If you have questions, please visit the Frequently Asked Questions section of the textPlus website. Remember, textPlus is NOT to be used for urgent needs. For medical emergencies, dial 911. Now available from your iPhone and Android! Please provide this summary of care documentation to your next provider. Your primary care clinician is listed as Gloria Fiore. If you have any questions after today's visit, please call [phone].

## 2017-10-18 NOTE — LETTER
NOTIFICATION RETURN TO WORK 
 
10/18/2017 11:05 AM 
 
Ms. Landry El [address] [address] To Whom It May Concern: Landry El is currently under the care of [facility]. Please excuse her from work on 10/18/17-10/19/17. She will return to work on: 10/20/17 If there are questions or concerns please have the patient contact our office. Sincerely, Cathy Ho NP

## 2017-10-18 NOTE — PROGRESS NOTES
HISTORY OF PRESENT ILLNESS  Anais Tinoco is a 32 y.o. female. HPI  Patient comes in today for URI symptoms. Last Thursday, ear started feeling stopped up, PND, sore throat, tonsils feel enlarged. Has a lot of nasal congestion and pressure. Has been taking pseudophed and zyrtec for 2 days without any relief. No fever, chills. Dry, barking cough. Some dyspnea. No wheezing. Hx of asthma. Has been using albuterol. Used last night before bed. 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 Patient states she received flu shot Thursday last week. Allergies   Allergen Reactions    Shellfish Derived Swelling     Wheezing       Past Medical History:   Diagnosis Date    Asthma     exercise / cold induced doesn't use inhaler    Interstitial cystitis        Past Surgical History:   Procedure Laterality Date    HX CHOLECYSTECTOMY         Social History     Social History    Marital status: SINGLE     Spouse name: N/A    Number of children: N/A    Years of education: N/A     Occupational History    Not on file. Social History Main Topics    Smoking status: Never Smoker    Smokeless tobacco: Never Used    Alcohol use No    Drug use: No    Sexual activity: Yes     Partners: Male     Birth control/ protection: IUD     Other Topics Concern    Not on file     Social History Narrative       Family History   Problem Relation Age of Onset    Hypertension Mother     Diabetes Mother     Diabetes Father     Hypertension Father     No Known Problems Sister     Cancer Brother        Current Outpatient Prescriptions   Medication Sig    hydroCHLOROthiazide (HYDRODIURIL) 25 mg tablet Take 1 Tab by mouth daily. Indications: Edema, hypertension    Cholecalciferol, Vitamin D3, (VITAMIN D3) 2,000 unit cap capsule Take 2,000 Units by mouth daily.  Indications: VITAMIN D DEFICIENCY (HIGH DOSE THERAPY)    albuterol (PROVENTIL HFA, VENTOLIN HFA, PROAIR HFA) 90 mcg/actuation inhaler Take 1 Puff by inhalation every 4 hours as needed for Wheezing. Indications: BRONCHOSPASM PREVENTION    cetirizine (ZYRTEC) 10 mg tablet Take 1 Tab by mouth daily.  multivitamin with iron tablet Take 1 Tab by mouth daily.  colestipol (COLESTID) 1 gram tablet Take 2 g by mouth daily. No current facility-administered medications for this visit. Review of Systems   Constitutional: Negative for chills, fever and malaise/fatigue. HENT: Positive for congestion, ear pain, sinus pain and sore throat. Negative for ear discharge and tinnitus. Respiratory: Positive for cough, shortness of breath and wheezing. Negative for hemoptysis and sputum production. Cardiovascular: Negative for chest pain and palpitations. Gastrointestinal: Negative for abdominal pain, diarrhea, nausea and vomiting. Genitourinary: Negative for dysuria, frequency and urgency. Musculoskeletal: Negative for myalgias. Skin: Negative. Neurological: Negative for dizziness. Endo/Heme/Allergies: Positive for environmental allergies. Vitals:    10/18/17 1026   BP: 118/82   Pulse: 91   Resp: 20   Temp: 98.4 °F (36.9 °C)   TempSrc: Oral   SpO2: 99%   Weight: 234 lb (106.1 kg)   Height: 5' 2\" (1.575 m)     Physical Exam   Constitutional: She is oriented to person, place, and time. Vital signs are normal. She appears well-developed and well-nourished. She is cooperative. HENT:   Right Ear: Hearing, external ear and ear canal normal. A middle ear effusion is present. Left Ear: Hearing, external ear and ear canal normal. A middle ear effusion is present. Nose: Mucosal edema present. Right sinus exhibits maxillary sinus tenderness and frontal sinus tenderness. Left sinus exhibits maxillary sinus tenderness and frontal sinus tenderness. Mouth/Throat: Uvula is midline and mucous membranes are normal. Mucous membranes are not pale and not dry. Posterior oropharyngeal erythema present.  No oropharyngeal exudate or posterior oropharyngeal edema. Cardiovascular: Normal rate, regular rhythm, S1 normal, S2 normal and normal heart sounds. Pulmonary/Chest: Effort normal and breath sounds normal. She has no decreased breath sounds. She has no wheezes. She has no rhonchi. She has no rales. Bronchospastic cough   Lymphadenopathy:        Head (right side): No submental, no submandibular, no tonsillar, no preauricular and no posterior auricular adenopathy present. Head (left side): No submental, no submandibular, no tonsillar, no preauricular and no posterior auricular adenopathy present. She has no cervical adenopathy. Right: No supraclavicular adenopathy present. Left: No supraclavicular adenopathy present. Neurological: She is alert and oriented to person, place, and time. Skin: Skin is warm, dry and intact. Psychiatric: She has a normal mood and affect. Her speech is normal and behavior is normal. Thought content normal.   Vitals reviewed. ASSESSMENT and PLAN    ICD-10-CM ICD-9-CM    1. Bronchitis J40 490 amoxicillin-clavulanate (AUGMENTIN) 875-125 mg per tablet      benzonatate (TESSALON) 200 mg capsule   2. Sinusitis, unspecified chronicity, unspecified location J32.9 473.9 amoxicillin-clavulanate (AUGMENTIN) 875-125 mg per tablet      benzonatate (TESSALON) 200 mg capsule   3. Pharyngitis, unspecified etiology J02.9 462 AMB POC RAPID STREP A     Encounter Diagnoses   Name Primary?  Bronchitis Yes    Sinusitis, unspecified chronicity, unspecified location     Pharyngitis, unspecified etiology      Orders Placed This Encounter    AMB POC RAPID STREP A    amoxicillin-clavulanate (AUGMENTIN) 875-125 mg per tablet    benzonatate (TESSALON) 200 mg capsule     Diagnoses and all orders for this visit:    1. Bronchitis - abx, tessalon, continue zyrtec. May try flonase.    -     amoxicillin-clavulanate (AUGMENTIN) 875-125 mg per tablet;  Take 1 Tab by mouth two (2) times a day for 10 days. -     benzonatate (TESSALON) 200 mg capsule; Take 1 Cap by mouth three (3) times daily as needed for Cough for up to 7 days. 2. Sinusitis, unspecified chronicity, unspecified location  -     amoxicillin-clavulanate (AUGMENTIN) 875-125 mg per tablet; Take 1 Tab by mouth two (2) times a day for 10 days. -     benzonatate (TESSALON) 200 mg capsule; Take 1 Cap by mouth three (3) times daily as needed for Cough for up to 7 days. 3. Pharyngitis, unspecified etiology  -     AMB POC RAPID STREP A - negative      Follow-up Disposition:  Return if symptoms worsen or fail to improve. I have reviewed the patient's allergies and made any necessary changes. Medical, procedural, social and family histories have been reviewed and updated as medically indicated. I have reconciled and/or revised patient medications in the EMR. I have discussed each diagnosis listed in this note with Talya Wahl and/or their family. I have discussed treatment options and the risk/benefit analysis of those options, including safe use of medications and possible medication side effects. Through the use of shared decision making we have agreed to the above plan. The patient has received an after-visit summary and questions were answered concerning future plans. Yaneth Osorio, NICK    This note will not be viewable in [employer].

## 2017-10-20 ENCOUNTER — TELEPHONE (OUTPATIENT)
Dept: FAMILY MEDICINE CLINIC | Age: 27
End: 2017-10-20

## 2017-10-20 DIAGNOSIS — J40 BRONCHITIS: Primary | ICD-10-CM

## 2017-10-20 RX ORDER — PREDNISONE 10 MG/1
TABLET ORAL
Qty: 21 TAB | Refills: 0 | Status: SHIPPED | OUTPATIENT
Start: 2017-10-20 | End: 2017-12-29 | Stop reason: ALTCHOICE

## 2017-10-20 NOTE — TELEPHONE ENCOUNTER
----- Message from Diony Dupree LPN sent at 24/07/7901  3:26 PM EDT -----  Regarding: Prescription request.   Contact: [phone]  This patient came to see you yesterday, her lungs are still very tight and she is getting winded easily. 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 Do you think she would benefit from a medrol pack? We tried to call the office but it rang and then put on hold for 15 min.

## 2017-10-22 ENCOUNTER — APPOINTMENT (OUTPATIENT)
Dept: GENERAL RADIOLOGY | Age: 27
End: 2017-10-22
Attending: EMERGENCY MEDICINE
Payer: COMMERCIAL

## 2017-10-22 ENCOUNTER — HOSPITAL ENCOUNTER (EMERGENCY)
Age: 27
Discharge: HOME | End: 2017-10-22
Attending: EMERGENCY MEDICINE | Admitting: EMERGENCY MEDICINE
Payer: COMMERCIAL

## 2017-10-22 VITALS
OXYGEN SATURATION: 100 % | HEART RATE: 80 BPM | WEIGHT: 238.1 LBS | HEIGHT: 62 IN | SYSTOLIC BLOOD PRESSURE: 155 MMHG | TEMPERATURE: 97.8 F | DIASTOLIC BLOOD PRESSURE: 70 MMHG | BODY MASS INDEX: 43.82 KG/M2 | RESPIRATION RATE: 20 BRPM

## 2017-10-22 DIAGNOSIS — J45.20 MILD INTERMITTENT ASTHMA WITHOUT COMPLICATION: ICD-10-CM

## 2017-10-22 DIAGNOSIS — J30.1 SEASONAL ALLERGIC RHINITIS DUE TO POLLEN: ICD-10-CM

## 2017-10-22 DIAGNOSIS — J20.9 ACUTE BRONCHITIS, UNSPECIFIED ORGANISM: Primary | ICD-10-CM

## 2017-10-22 LAB
ANION GAP SERPL CALC-SCNC: 11 MMOL/L (ref 5–15)
BASOPHILS # BLD: 0 K/UL (ref 0–0.1)
BASOPHILS NFR BLD: 0 % (ref 0–1)
BUN SERPL-MCNC: 14 MG/DL (ref 6–20)
BUN/CREAT SERPL: 17 (ref 12–20)
CALCIUM SERPL-MCNC: 9.3 MG/DL (ref 8.5–10.1)
CHLORIDE SERPL-SCNC: 108 MMOL/L (ref 97–108)
CO2 SERPL-SCNC: 19 MMOL/L (ref 21–32)
CREAT SERPL-MCNC: 0.81 MG/DL (ref 0.55–1.02)
D DIMER PPP FEU-MCNC: 0.29 MG/L FEU (ref 0–0.65)
EOSINOPHIL # BLD: 0 K/UL (ref 0–0.4)
EOSINOPHIL NFR BLD: 0 % (ref 0–7)
ERYTHROCYTE [DISTWIDTH] IN BLOOD BY AUTOMATED COUNT: 14 % (ref 11.5–14.5)
GLUCOSE SERPL-MCNC: 128 MG/DL (ref 65–100)
HCG SERPL-ACNC: <1 MIU/ML (ref 0–6)
HCT VFR BLD AUTO: 37.6 % (ref 35–47)
HGB BLD-MCNC: 12.6 G/DL (ref 11.5–16)
LYMPHOCYTES # BLD: 1.9 K/UL (ref 0.8–3.5)
LYMPHOCYTES NFR BLD: 12 % (ref 12–49)
MCH RBC QN AUTO: 28.6 PG (ref 26–34)
MCHC RBC AUTO-ENTMCNC: 33.5 G/DL (ref 30–36.5)
MCV RBC AUTO: 85.5 FL (ref 80–99)
MONOCYTES # BLD: 0.4 K/UL (ref 0–1)
MONOCYTES NFR BLD: 2 % (ref 5–13)
NEUTS SEG # BLD: 13.6 K/UL (ref 1.8–8)
NEUTS SEG NFR BLD: 86 % (ref 32–75)
PLATELET # BLD AUTO: 384 K/UL (ref 150–400)
POTASSIUM SERPL-SCNC: 4.1 MMOL/L (ref 3.5–5.1)
RBC # BLD AUTO: 4.4 M/UL (ref 3.8–5.2)
SODIUM SERPL-SCNC: 138 MMOL/L (ref 136–145)
TROPONIN I SERPL-MCNC: <0.04 NG/ML
WBC # BLD AUTO: 15.9 K/UL (ref 3.6–11)

## 2017-10-22 PROCEDURE — 93005 ELECTROCARDIOGRAM TRACING: CPT

## 2017-10-22 PROCEDURE — 85379 FIBRIN DEGRADATION QUANT: CPT | Performed by: EMERGENCY MEDICINE

## 2017-10-22 PROCEDURE — 85025 COMPLETE CBC W/AUTO DIFF WBC: CPT | Performed by: EMERGENCY MEDICINE

## 2017-10-22 PROCEDURE — 84702 CHORIONIC GONADOTROPIN TEST: CPT | Performed by: EMERGENCY MEDICINE

## 2017-10-22 PROCEDURE — 96361 HYDRATE IV INFUSION ADD-ON: CPT

## 2017-10-22 PROCEDURE — 71020 XR CHEST PA LAT: CPT

## 2017-10-22 PROCEDURE — 84484 ASSAY OF TROPONIN QUANT: CPT | Performed by: EMERGENCY MEDICINE

## 2017-10-22 PROCEDURE — 77030029684 HC NEB SM VOL KT MONA -A

## 2017-10-22 PROCEDURE — 74011000250 HC RX REV CODE- 250: Performed by: EMERGENCY MEDICINE

## 2017-10-22 PROCEDURE — 94640 AIRWAY INHALATION TREATMENT: CPT

## 2017-10-22 PROCEDURE — 99284 EMERGENCY DEPT VISIT MOD MDM: CPT

## 2017-10-22 PROCEDURE — 80048 BASIC METABOLIC PNL TOTAL CA: CPT | Performed by: EMERGENCY MEDICINE

## 2017-10-22 PROCEDURE — 74011250636 HC RX REV CODE- 250/636: Performed by: EMERGENCY MEDICINE

## 2017-10-22 PROCEDURE — 96374 THER/PROPH/DIAG INJ IV PUSH: CPT

## 2017-10-22 PROCEDURE — 36415 COLL VENOUS BLD VENIPUNCTURE: CPT | Performed by: EMERGENCY MEDICINE

## 2017-10-22 PROCEDURE — 74011636637 HC RX REV CODE- 636/637: Performed by: EMERGENCY MEDICINE

## 2017-10-22 RX ORDER — LORAZEPAM 2 MG/ML
0.5 INJECTION INTRAMUSCULAR
Status: COMPLETED | OUTPATIENT
Start: 2017-10-22 | End: 2017-10-22

## 2017-10-22 RX ORDER — IPRATROPIUM BROMIDE AND ALBUTEROL SULFATE 2.5; .5 MG/3ML; MG/3ML
3 SOLUTION RESPIRATORY (INHALATION) ONCE
Status: COMPLETED | OUTPATIENT
Start: 2017-10-22 | End: 2017-10-22

## 2017-10-22 RX ORDER — ALBUTEROL SULFATE 90 UG/1
2 AEROSOL, METERED RESPIRATORY (INHALATION)
Qty: 1 INHALER | Refills: 0 | Status: SHIPPED | OUTPATIENT
Start: 2017-10-22

## 2017-10-22 RX ORDER — PREDNISONE 10 MG/1
20 TABLET ORAL
Status: COMPLETED | OUTPATIENT
Start: 2017-10-22 | End: 2017-10-22

## 2017-10-22 RX ADMIN — LORAZEPAM 0.5 MG: 2 INJECTION INTRAMUSCULAR; INTRAVENOUS at 04:20

## 2017-10-22 RX ADMIN — SODIUM CHLORIDE 1000 ML: 900 INJECTION, SOLUTION INTRAVENOUS at 01:36

## 2017-10-22 RX ADMIN — PREDNISONE 20 MG: 10 TABLET ORAL at 04:19

## 2017-10-22 RX ADMIN — IPRATROPIUM BROMIDE AND ALBUTEROL SULFATE 3 ML: .5; 3 SOLUTION RESPIRATORY (INHALATION) at 01:36

## 2017-10-22 NOTE — ED PROVIDER NOTES
[facility].  EMERGENCY DEPARTMENT HISTORY AND PHYSICAL EXAM       Date of Service: 10/22/2017   Patient Name: Ruddy Collier   YOB: 1990  Medical Record Number: [id number]    History of Presenting Illness     Chief Complaint   Patient presents with    Shortness of Breath     x a few days. Pt was dx with bronchitis wednesday. Pt ambualtory to triage via steady gait.  Cough        History Provided By:  patient    Additional History:   Ruddy Collier is a 32 y.o. female with PMhx significant for asthma, who presents ambulatory to the ED with cc of a worsening SOB x 3 days. She reports associated symptoms of chest tightness, cough, and sinus pressure. She was diagnosed earlier in the week with sinusitis and bronchitis by her PMD. She was started on a medrol pack yesterday for her SOB symptoms but states it is not helping. Today she used her albuterol MDI without relief of symptoms. Pt reports her SOB has been exacerbated upon any exertion for the past 3 days. She notes her sinus pressure has improved since treatment by her PMD. 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 Pt had received a flu shot recently as well. Social Hx: - Tobacco, - EtOH, - Illicit Drugs    There are no other complaints, changes or physical findings at this time.     Primary Care Provider: Talya Goodrich MD     Past History     Past Medical History:   Past Medical History:   Diagnosis Date    Asthma     exercise / cold induced doesn't use inhaler    Interstitial cystitis         Past Surgical History:   Past Surgical History:   Procedure Laterality Date    HX CHOLECYSTECTOMY          Family History:   Family History   Problem Relation Age of Onset    Hypertension Mother     Diabetes Mother     Diabetes Father     Hypertension Father     No Known Problems Sister     Cancer Brother         Social History:   Social History   Substance Use Topics    Smoking status: Never Smoker    Smokeless tobacco: Never Used    Alcohol use No        Allergies: Allergies   Allergen Reactions    Shellfish Derived Swelling     Wheezing    Banana Other (comments)     Abdominal pain           Review of Systems   Review of Systems   Constitutional: Negative for activity change, appetite change, chills, fever and unexpected weight change. HENT: Positive for sinus pressure. Negative for congestion. Eyes: Negative for pain and visual disturbance. Respiratory: Positive for cough, chest tightness and shortness of breath. Cardiovascular: Negative for chest pain. Gastrointestinal: Negative for abdominal pain, diarrhea, nausea and vomiting. Genitourinary: Negative for dysuria. Musculoskeletal: Negative for back pain. Skin: Negative for rash. Neurological: Negative for headaches. Physical Exam  Physical Exam   Constitutional: She is oriented to person, place, and time. She appears well-developed. Obese female   HENT:   Head: Normocephalic and atraumatic. Mouth/Throat: Oropharynx is clear and moist.   Eyes: Conjunctivae and EOM are normal. Pupils are equal, round, and reactive to light. Right eye exhibits no discharge. Left eye exhibits no discharge. Neck: Normal range of motion. Neck supple. Cardiovascular: Normal rate, regular rhythm and normal heart sounds. No murmur heard. Borderline tachycardic at 96   Pulmonary/Chest: Effort normal and breath sounds normal. No accessory muscle usage. Tachypnea noted. No respiratory distress. She has no decreased breath sounds. She has no wheezes. She has no rales. She exhibits no tenderness. Abdominal: Soft. Bowel sounds are normal. She exhibits no distension. There is no tenderness. Musculoskeletal: Normal range of motion. She exhibits no edema. No peripheral edema   Neurological: She is alert and oriented to person, place, and time. No cranial nerve deficit.  She exhibits normal muscle tone.   Skin: Skin is warm and dry. No rash noted. She is not diaphoretic. Psychiatric: Her mood appears anxious. Nursing note and vitals reviewed. Medical Decision Making   I am the first provider for this patient. I reviewed the vital signs, available nursing notes, past medical history, past surgical history, family history and social history. Old Medical Records: Old medical records. Provider Notes:   Asthmatic with dyspnea unresolved by albuterol and OTC medications. Appears anxious but saturation and breath sounds normal.  DDX: pneumonia, bronchitis, PE    ED Course:  1:17 AM   Initial assessment performed. The patients presenting problems have been discussed, and they are in agreement with the care plan formulated and outlined with them. I have encouraged them to ask questions as they arise throughout their visit. Progress Notes:   02:30 Continues with SOB after treatment but her exam is unchanged. Continues to appear tachypnic and in distress. Oxygen applied for comfort by RN. Xray ordered. 04:00 Patient unchanged with oxygen. Labs and vitals wnl. Will try ativan while awaiting xray. 06:00 Appears comfortable, continue to await xray read. Pt ready to go. Will call her later if read + for infiltrate.     Diagnostic Study Results   Labs -      Recent Results (from the past 12 hour(s))   EKG, 12 LEAD, INITIAL    Collection Time: 10/22/17  1:14 AM   Result Value Ref Range    Ventricular Rate 86 BPM    Atrial Rate 86 BPM    P-R Interval 160 ms    QRS Duration 84 ms    Q-T Interval 328 ms    QTC Calculation (Bezet) 392 ms    Calculated P Axis 49 degrees    Calculated R Axis 8 degrees    Calculated T Axis 24 degrees    Diagnosis       Normal sinus rhythm  Normal ECG  No previous ECGs available     CBC WITH AUTOMATED DIFF    Collection Time: 10/22/17  1:38 AM   Result Value Ref Range    WBC 15.9 (H) 3.6 - 11.0 K/uL    RBC 4.40 3.80 - 5.20 M/uL    HGB 12.6 11.5 - 16.0 g/dL    HCT 37.6 35.0 - 47.0 %    MCV 85.5 80.0 - 99.0 FL    MCH 28.6 26.0 - 34.0 PG    MCHC 33.5 30.0 - 36.5 g/dL    RDW 14.0 11.5 - 14.5 %    PLATELET 010 557 - 528 K/uL    NEUTROPHILS 86 (H) 32 - 75 %    LYMPHOCYTES 12 12 - 49 %    MONOCYTES 2 (L) 5 - 13 %    EOSINOPHILS 0 0 - 7 %    BASOPHILS 0 0 - 1 %    ABS. NEUTROPHILS 13.6 (H) 1.8 - 8.0 K/UL    ABS. LYMPHOCYTES 1.9 0.8 - 3.5 K/UL    ABS. MONOCYTES 0.4 0.0 - 1.0 K/UL    ABS. EOSINOPHILS 0.0 0.0 - 0.4 K/UL    ABS. BASOPHILS 0.0 0.0 - 0.1 K/UL   METABOLIC PANEL, BASIC    Collection Time: 10/22/17  1:38 AM   Result Value Ref Range    Sodium 138 136 - 145 mmol/L    Potassium 4.1 3.5 - 5.1 mmol/L    Chloride 108 97 - 108 mmol/L    CO2 19 (L) 21 - 32 mmol/L    Anion gap 11 5 - 15 mmol/L    Glucose 128 (H) 65 - 100 mg/dL    BUN 14 6 - 20 MG/DL    Creatinine 0.81 0.55 - 1.02 MG/DL    BUN/Creatinine ratio 17 12 - 20      GFR est AA >60 >60 ml/min/1.73m2    GFR est non-AA >60 >60 ml/min/1.73m2    Calcium 9.3 8.5 - 10.1 MG/DL   D DIMER    Collection Time: 10/22/17  1:38 AM   Result Value Ref Range    D-dimer 0.29 0.00 - 0.65 mg/L FEU   TROPONIN I    Collection Time: 10/22/17  1:38 AM   Result Value Ref Range    Troponin-I, Qt. <0.04 <0.05 ng/mL   BETA HCG, QT    Collection Time: 10/22/17  1:38 AM   Result Value Ref Range    Beta HCG, QT <1 0 - 6 MIU/ML       Radiologic Studies -  The following have been ordered and reviewed:  XR CHEST PA LAT    (Results Pending)     CT Results  (Last 48 hours)    None        CXR Results  (Last 48 hours)    None          Vital Signs-Reviewed the patient's vital signs.    Patient Vitals for the past 12 hrs:   Temp Pulse Resp BP SpO2   10/22/17 0343 - 82 21 160/80 100 %   10/22/17 0330 - 79 21 163/64 100 %   10/22/17 0315 - 87 24 147/53 100 %   10/22/17 0300 - 84 24 157/82 100 %   10/22/17 0245 - 87 25 162/86 100 %   10/22/17 0110 97.8 °F (36.6 °C) 84 18 (!) 164/113 100 %       Medications Given in the ED:  Medications   albuterol-ipratropium (DUO-NEB) 2.5 MG-0.5 MG/3 ML (3 mL Nebulization Given 10/22/17 0136)   sodium chloride 0.9 % bolus infusion 1,000 mL (0 mL IntraVENous IV Completed 10/22/17 0411)   LORazepam (ATIVAN) injection 0.5 mg (0.5 mg IntraVENous Given 10/22/17 0420)   predniSONE (DELTASONE) tablet 20 mg (20 mg Oral Given 10/22/17 0419)       EKG interpretation: (Preliminary): 0114  Rhythm: normal sinus rhythm; and regular . Rate (approx.): 86 bpm; Axis: normal; HI interval: normal; QRS interval: normal ; ST/T wave: normal; Other findings: normal.  Written by Otoniel Mcneal, ED Scribe, as dictated by Levon Awad MD    Diagnosis   Clinical Impression:   1. Acute bronchitis, unspecified organism         Plan:  1: Discharge  Follow-up Information     Follow up With Details Comments Contact Info    [facility] EMERGENCY DEPT  If symptoms worsen [address] [place] [address]  [phone]        2:   Current Discharge Medication List        Return to ED if Worse    Disposition Note:  DISCHARGE NOTE  5:32 AM  The patient has been re-evaluated and is ready for discharge. Reviewed available results with patient. Counseled patient on diagnosis and care plan. Patient has expressed understanding, and all questions have been answered. Patient agrees with plan and agrees to follow up as recommended, or return to the ED if their symptoms worsen. Discharge instructions have been provided and explained to the patient, along with reasons to return to the ED.    _______________________________   Attestations: This note is prepared by Otoniel Mcneal, acting as Scribe for Levon Awad MD.    Levon Awad MD: The scribe's documentation has been prepared under my direction and personally reviewed by me in its entirety.  I confirm that the note above accurately reflects all work, treatment, procedures, and medical decision making performed by me.    _______________________________

## 2017-10-23 LAB
ATRIAL RATE: 86 BPM
CALCULATED P AXIS, ECG09: 49 DEGREES
CALCULATED R AXIS, ECG10: 8 DEGREES
CALCULATED T AXIS, ECG11: 24 DEGREES
DIAGNOSIS, 93000: NORMAL
P-R INTERVAL, ECG05: 160 MS
Q-T INTERVAL, ECG07: 328 MS
QRS DURATION, ECG06: 84 MS
QTC CALCULATION (BEZET), ECG08: 392 MS
VENTRICULAR RATE, ECG03: 86 BPM

## 2017-10-24 ENCOUNTER — OFFICE VISIT (OUTPATIENT)
Dept: INTERNAL MEDICINE CLINIC | Age: 27
End: 2017-10-24

## 2017-10-24 VITALS
HEIGHT: 62 IN | OXYGEN SATURATION: 99 % | WEIGHT: 238 LBS | RESPIRATION RATE: 20 BRPM | BODY MASS INDEX: 43.79 KG/M2 | HEART RATE: 99 BPM | DIASTOLIC BLOOD PRESSURE: 78 MMHG | SYSTOLIC BLOOD PRESSURE: 128 MMHG | TEMPERATURE: 98.3 F

## 2017-10-24 DIAGNOSIS — J30.89 ENVIRONMENTAL AND SEASONAL ALLERGIES: ICD-10-CM

## 2017-10-24 DIAGNOSIS — J20.9 ACUTE BRONCHITIS, UNSPECIFIED ORGANISM: Primary | ICD-10-CM

## 2017-10-24 DIAGNOSIS — Z87.09 H/O EXTRINSIC ASTHMA: ICD-10-CM

## 2017-10-24 DIAGNOSIS — R60.0 LOCALIZED EDEMA: ICD-10-CM

## 2017-10-24 DIAGNOSIS — J45.21 MILD INTERMITTENT ASTHMA WITH ACUTE EXACERBATION: ICD-10-CM

## 2017-10-24 RX ORDER — AZITHROMYCIN 250 MG/1
TABLET, FILM COATED ORAL
Qty: 6 TAB | Refills: 0 | Status: SHIPPED | OUTPATIENT
Start: 2017-10-24 | End: 2017-10-29

## 2017-10-24 RX ORDER — PREDNISONE 10 MG/1
TABLET ORAL
Qty: 15 TAB | Refills: 0 | Status: SHIPPED | OUTPATIENT
Start: 2017-10-24 | End: 2017-12-29 | Stop reason: ALTCHOICE

## 2017-10-24 RX ORDER — CHOLESTYRAMINE 4 G/9G
POWDER, FOR SUSPENSION ORAL AS NEEDED
COMMUNITY

## 2017-10-24 NOTE — MR AVS SNAPSHOT
Visit Information Date & Time Provider Department Dept. Phone Encounter #  
 10/24/2017  8:00 AM MD [facility] [phone] [id number] Follow-up Instructions Return in about 4 weeks (around 11/21/2017), or if symptoms worsen or fail to improve, for Chronic medical problems follow up. Upcoming Health Maintenance Date Due Pneumococcal 19-64 Medium Risk (1 of 1 - PPSV23) 8/30/2007 DTaP/Tdap/Td series (1 - Tdap) 8/30/2009 PAP AKA CERVICAL CYTOLOGY 6/1/2018 Allergies as of 10/24/2017  Review Complete On: 10/24/2017 By: Nancie Amor LPN Severity Noted Reaction Type Reactions Shellfish Derived High 07/22/2015    Swelling Wheezing Banana  10/22/2017    Other (comments) Abdominal pain Current Immunizations  Never Reviewed Name Date Hep B Vaccine 11/29/2000, 6/12/2000, 5/8/2000 Hib 3/3/1992 Influenza Vaccine (Quad) PF 10/7/2016 MMR 9/13/1993, 3/27/1990 Poliovirus vaccine 9/13/1993, 3/3/1992, 5/31/1989, 1/19/1989 Not reviewed this visit You Were Diagnosed With   
  
 Codes Comments Acute bronchitis, unspecified organism    -  Primary ICD-10-CM: J20.9 ICD-9-CM: 466.0 Mild intermittent asthma with acute exacerbation     ICD-10-CM: J45.21 ICD-9-CM: 145.73 Environmental and seasonal allergies     ICD-10-CM: J30.89 ICD-9-CM: 477.8 Localized edema     ICD-10-CM: R60.0 ICD-9-CM: 782.3 H/O extrinsic asthma     ICD-10-CM: Z87.09 
ICD-9-CM: V12.69 Vitals BP Pulse Temp Resp Height(growth percentile) Weight(growth percentile) 128/78 99 98.3 °F (36.8 °C) (Oral) 20 5' 2\" (1.575 m) 238 lb (108 kg) LMP SpO2 BMI OB Status Smoking Status 09/28/2017 99% 43.53 kg/m2 Recent pregnancy Never Smoker BMI and BSA Data Body Mass Index Body Surface Area  
 43.53 kg/m 2 2.17 m 2 Preferred Pharmacy Pharmacy Name Phone [facility] [address], [facility] [address] [phone] Your Updated Medication List  
  
   
This list is accurate as of: 10/24/17  8:52 AM.  Always use your most recent med list.  
  
  
  
  
 albuterol 90 mcg/actuation inhaler Commonly known as:  PROVENTIL HFA, VENTOLIN HFA, PROAIR HFA Take 2 Puffs by inhalation every four (4) hours as needed for Wheezing. amoxicillin-clavulanate 875-125 mg per tablet Commonly known as:  AUGMENTIN Take 1 Tab by mouth two (2) times a day for 10 days. azithromycin 250 mg tablet Commonly known as:  ZITHROMAX  
use as directed  
  
 cetirizine 10 mg tablet Commonly known as:  ZYRTEC Take 1 Tab by mouth daily. Cholecalciferol (Vitamin D3) 2,000 unit Cap capsule Commonly known as:  VITAMIN D3 Take 2,000 Units by mouth daily. Indications: VITAMIN D DEFICIENCY (HIGH DOSE THERAPY)  
  
 hydroCHLOROthiazide 25 mg tablet Commonly known as:  HYDRODIURIL Take 1 Tab by mouth daily. Indications: Edema, hypertension Peak Flow Meter Kathy  
1 Each by Does Not Apply route two (2) times daily as needed. * predniSONE 10 mg dose pack Commonly known as:  STERAPRED DS See administration instruction per 10mg dose pack * predniSONE 10 mg tablet Commonly known as:  DELTASONE  
2 tablets for 5 days then 1 tablet for 5 days then stop QUESTRAN 4 gram powder Generic drug:  cholestyramine-sucrose Take  by mouth three (3) times daily (with meals). * Notice: This list has 2 medication(s) that are the same as other medications prescribed for you. Read the directions carefully, and ask your doctor or other care provider to review them with you. Prescriptions Sent to Pharmacy Refills  
 predniSONE (DELTASONE) 10 mg tablet 0 Si tablets for 5 days then 1 tablet for 5 days then stop  Class: Normal  
 Pharmacy: [facility] Ph #: [phone]  
 azithromycin (ZITHROMAX) 250 mg tablet 0 Sig: use as directed Class: Normal  
 Pharmacy: [facility] [address], [address] [place] [address] Ph #: [phone] Peak Flow Meter elma 0 Si Each by Does Not Apply route two (2) times daily as needed. Class: Normal  
 Pharmacy: [facility] [address], [address] [place] [address] Ph #: [phone] Route: Does Not Apply We Performed the Following [facility] PEAK FLOW FLOWSHEET [[id number] CPT(R)] REFERRAL TO ALLERGY [REF5 Custom] Comments:  
 Evaluate/treat/orders/ and instruct for associated diagnoses. Follow-up Instructions Return in about 4 weeks (around 2017), or if symptoms worsen or fail to improve, for Chronic medical problems follow up. Referral Information Referral ID Referred By Referred To  
  
 [id number] Jonatan Alexander. Amaris Torres [facility] [phone] [address] [place] [address] Visits Status Start Date End Date 1 New Request 10/24/17 10/24/18 If your referral has a status of pending review or denied, additional information will be sent to support the outcome of this decision. Patient Instructions Asthma Action Plan: After Your Visit Your Care Instructions An asthma action plan is based on peak flow and asthma symptoms. Sorting symptoms and peak flow into red, yellow, and green \"zones\" can help you know how bad your asthma is and what actions you should take. Work with your doctor to make your plan. An action plan may include: · The peak flow readings and symptoms for each zone. · What medicines to take in each zone. · When to call a doctor. · A list of emergency contact numbers. · A list of your asthma triggers. Follow-up care is a key part of your treatment and safety. Be sure to make and go to all appointments, and call your doctor if you are having problems. It's also a good idea to know your test results and keep a list of the medicines you take. How can you care for yourself at home? · Take your daily medicines to help minimize long-term damage and avoid asthma attacks. · Check your peak flow every morning and evening. This is the best way to know how well your lungs are working. · Check your action plan to see what zone you are in. 
¨ If you are in the green zone, keep taking your daily asthma medicines as prescribed. ¨ If you are in the yellow zone, you may be having or will soon have an asthma attack. You may not have any symptoms, but your lungs are not working as well as they should. Take the medicines listed in your action plan. If you stay in the yellow zone, your doctor may need to increase the dose or add a medicine. ¨ If you are in the red zone, follow your action plan. If your symptoms or peak flow don't improve soon, you may need to go to the emergency room or be admitted to the hospital. 
· Use an asthma diary. Write down your peak flow readings in the asthma diary. If you have an attack, write down what caused it (if you know), the symptoms, and what medicine you took. · Make sure you know how and when to call your doctor or go to the hospital. 
· Take both the asthma action plan and the asthma diaryalong with your peak flow meter and medicineswhen you see your doctor. Tell your doctor if you are having trouble following your action plan. When should you call for help? Call 911 anytime you think you may need emergency care. For example, call if: 
· You have severe trouble breathing. Call your doctor now or seek immediate medical care if: 
· Your symptoms do not get better after you have followed your asthma action plan. · You cough up yellow, dark brown, or bloody mucus (sputum). Watch closely for changes in your health, and be sure to contact your doctor if: 
· Your coughing and wheezing get worse. · You need to use quick-relief medicine on more than 2 days a week (unless it is just for exercise). · You need help figuring out what is triggering your asthma attacks. Where can you learn more? Go to Delphi.be Enter 908 [id number] in the search box to learn more about \"Asthma Action Plan: After Your Visit. \"  
© 5418-0897 Healthwise, Incorporated. Care instructions adapted under license by [facility] 5 examples (which disclaims liability or warranty for this information). This care instruction is for use with your licensed healthcare professional. If you have questions about a medical condition or this instruction, always ask your healthcare professional. [address] any warranty or liability for your use of this information. Content Version: 49.8.092758; Last Revised: March 9, 2012 Bronchitis: Care Instructions Your Care Instructions Bronchitis is inflammation of the bronchial tubes, which carry air to the lungs. The tubes swell and produce mucus, or phlegm. The mucus and inflamed bronchial tubes make you cough. You may have trouble breathing. Most cases of bronchitis are caused by viruses like those that cause colds. Antibiotics usually do not help and they may be harmful. Bronchitis usually develops rapidly and lasts about 2 to 3 weeks in otherwise healthy people. Follow-up care is a key part of your treatment and safety. Be sure to make and go to all appointments, and call your doctor if you are having problems. It's also a good idea to know your test results and keep a list of the medicines you take. How can you care for yourself at home? · Take all medicines exactly as prescribed. Call your doctor if you think you are having a problem with your medicine.  
· Get some extra rest. 
 · Take an over-the-counter pain medicine, such as acetaminophen (Tylenol), ibuprofen (Advil, Motrin), or naproxen (Aleve) to reduce fever and relieve body aches. Read and follow all instructions on the label. · Do not take two or more pain medicines at the same time unless the doctor told you to. Many pain medicines have acetaminophen, which is Tylenol. Too much acetaminophen (Tylenol) can be harmful. · Take an over-the-counter cough medicine that contains dextromethorphan to help quiet a dry, hacking cough so that you can sleep. Avoid cough medicines that have more than one active ingredient. Read and follow all instructions on the label. · Breathe moist air from a humidifier, hot shower, or sink filled with hot water. The heat and moisture will thin mucus so you can cough it out. · Do not smoke. Smoking can make bronchitis worse. If you need help quitting, talk to your doctor about stop-smoking programs and medicines. These can increase your chances of quitting for good. When should you call for help? Call 911 anytime you think you may need emergency care. For example, call if: 
· You have severe trouble breathing. Call your doctor now or seek immediate medical care if: 
· You have new or worse trouble breathing. · You cough up dark brown or bloody mucus (sputum). · You have a new or higher fever. · You have a new rash. Watch closely for changes in your health, and be sure to contact your doctor if: 
· You cough more deeply or more often, especially if you notice more mucus or a change in the color of your mucus. · You are not getting better as expected. Where can you learn more? Go to http://jamin-neisha.info/. Enter H333 in the search box to learn more about \"Bronchitis: Care Instructions. \" Current as of: March 25, 2017 Content Version: 11.3 © 6378-5424 [employer].  Care instructions adapted under license by [address] (which disclaims liability or warranty for this information). If you have questions about a medical condition or this instruction, always ask your healthcare professional. [address] any warranty or liability for your use of this information. Asthma Attack: Care Instructions Your Care Instructions During an asthma attack, the airways swell and narrow. This makes it hard to breathe. Severe asthma attacks can be life-threatening, but you can help prevent them by keeping your asthma under control and treating symptoms before they get bad. Symptoms include being short of breath, having chest tightness, coughing, and wheezing. Noting and treating these symptoms can also help you avoid future trips to the emergency room. The doctor has checked you carefully, but problems can develop later. If you notice any problems or new symptoms, get medical treatment right away. Follow-up care is a key part of your treatment and safety. Be sure to make and go to all appointments, and call your doctor if you are having problems. It's also a good idea to know your test results and keep a list of the medicines you take. How can you care for yourself at home? · Follow your asthma action plan to prevent and treat attacks. If you don't have an asthma action plan, work with your doctor to create one. · Take your asthma medicines exactly as prescribed. Talk to your doctor right away if you have any questions about how to take them. ¨ Use your quick-relief medicine when you have symptoms of an attack. Quick-relief medicine is usually an albuterol inhaler. Some people need to use quick-relief medicine before they exercise. ¨ Take your controller medicine every day, not just when you have symptoms. Controller medicine is usually an inhaled corticosteroid. The goal is to prevent problems before they occur.  Don't use your controller medicine to treat an attack that has already started. It doesn't work fast enough to help. ¨ If your doctor prescribed corticosteroid pills to use during an attack, take them exactly as prescribed. It may take hours for the pills to work, but they may make the episode shorter and help you breathe better. ¨ Keep your quick-relief medicine with you at all times. · Talk to your doctor before using other medicines. Some medicines, such as aspirin, can cause asthma attacks in some people. · If you have a peak flow meter, use it to check how well you are breathing. This can help you predict when an asthma attack is going to occur. Then you can take medicine to prevent the asthma attack or make it less severe. · Do not smoke or allow others to smoke around you. Avoid smoky places. Smoking makes asthma worse. If you need help quitting, talk to your doctor about stop-smoking programs and medicines. These can increase your chances of quitting for good. · Learn what triggers an asthma attack for you, and avoid the triggers when you can. Common triggers include colds, smoke, air pollution, dust, pollen, mold, pets, cockroaches, stress, and cold air. · Avoid colds and the flu. Get a pneumococcal vaccine shot. If you have had one before, ask your doctor if you need a second dose. Get a flu vaccine every fall. If you must be around people with colds or the flu, wash your hands often. When should you call for help? Call 911 anytime you think you may need emergency care. For example, call if: 
· You have severe trouble breathing. Call your doctor now or seek immediate medical care if: 
· Your symptoms do not get better after you have followed your asthma action plan. · You have new or worse trouble breathing. · Your coughing and wheezing get worse. · You cough up dark brown or bloody mucus (sputum). · You have a new or higher fever. Watch closely for changes in your health, and be sure to contact your doctor if: · You need to use quick-relief medicine on more than 2 days a week (unless it is just for exercise). · You cough more deeply or more often, especially if you notice more mucus or a change in the color of your mucus. · You are not getting better as expected. Where can you learn more? Go to http://jamin-neisha.info/. Enter C131 in the search box to learn more about \"Asthma Attack: Care Instructions. \" Current as of: March 25, 2017 Content Version: 11.3 © 3926-7649 [employer]. Care instructions adapted under license by [employer] (which disclaims liability or warranty for this information). If you have questions about a medical condition or this instruction, always ask your healthcare professional. [address] any warranty or liability for your use of this information. Introducing \[facility] [place] [facility]\"" & HEALTH SERVICES! [facility] introduces [employer] patient portal. Now you can access parts of your medical record, email your doctor's office, and request medication refills online. 1. In your internet browser, go to [email or web address] [facility]/Patent Safari 2. Click on the First Time User? Click Here link in the Sign In box. You will see the New Member Sign Up page. 3. Enter your [employer] Access Code exactly as it appears below. You will not need to use this code after youve completed the sign-up process. If you do not sign up before the expiration date, you must request a new code. · [employer] Access Code: [email or web address] Expires: 1/16/2018 11:04 AM 
 
4. Enter the last four digits of your Social Security Number (xxxx) and Date of Birth (mm/dd/yyyy) as indicated and click Submit. You will be taken to the next sign-up page. 5. Create a Roundst ID. This will be your [employer] login ID and cannot be changed, so think of one that is secure and easy to remember. 6. Create a [employer]st password. You can change your password at any time. 7. Enter your Password Reset Question and Answer. This can be used at a later time if you forget your password. 8. Enter your e-mail address. You will receive e-mail notification when new information is available in [address] 9. Click Sign Up. You can now view and download portions of your medical record. 10. Click the Download Summary menu link to download a portable copy of your medical information. If you have questions, please visit the Frequently Asked Questions section of the [employer] website. Remember, [employer] is NOT to be used for urgent needs. For medical emergencies, dial 911. Now available from your iPhone and Android! Please provide this summary of care documentation to your next provider. Your primary care clinician is listed as Nonnie Layman. If you have any questions after today's visit, please call [phone].

## 2017-10-24 NOTE — PROGRESS NOTES
This note will not be viewable in [address] Kenny Gibson is a  32 y.o. female presents for visit. Chief Complaint   Patient presents with    Cold Symptoms     was sick with bronchitis last week, did antibiotics and steriods, her antibiotic caused a rash last night, feels like she can't get a full breath       URI    The history is provided by the patient. This is a new problem. The current episode started more than 1 week ago. The problem has been gradually improving. Patient reports a subjective fever - was not measured. Associated symptoms include chest pain, congestion and cough (nonproductive). Pertinent negatives include no abdominal pain, no diarrhea, no nausea, no vomiting, no dysuria, no ear pain, no headaches, no plugged ear sensation, no rhinorrhea, no sinus pain, no sneezing, no sore throat, no swollen glands, no joint pain, no joint swelling, no neck pain, no rash and no wheezing. She has tried an inhaler, increased fluids, other medications, sleep and NSAIDs for the symptoms. The treatment provided mild relief. H/o asthma   Had more symptoms when a child  Used albuterol 5 times yesterday  On 10 mg of prednisone   H/o of allergies  Not seen a allergist or had allergy testing   Never had PFTs  Does not have a peak flow meter      Edema chronic on HCTZ takes it PRN. Review of Systems   HENT: Positive for congestion. Negative for ear pain, rhinorrhea, sinus pain, sneezing and sore throat. Respiratory: Positive for cough (nonproductive). Negative for wheezing. Cardiovascular: Positive for chest pain. Gastrointestinal: Negative for abdominal pain, diarrhea, nausea and vomiting. Genitourinary: Negative for dysuria. Musculoskeletal: Negative for joint pain and neck pain. Skin: Negative for rash. Neurological: Negative for headaches.         Past Medical History:   Diagnosis Date    Asthma     exercise / cold induced doesn't use inhaler    Interstitial cystitis       Past Surgical History:   Procedure Laterality Date    HX CHOLECYSTECTOMY          Social History   Substance Use Topics    Smoking status: Never Smoker    Smokeless tobacco: Never Used    Alcohol use No      Social History     Social History Narrative     Family History   Problem Relation Age of Onset    Hypertension Mother     Diabetes Mother     Diabetes Father     Hypertension Father     No Known Problems Sister     Cancer Brother       Prior to Admission medications    Medication Sig Start Date End Date Taking? Authorizing Provider   cholestyramine-sucrose Nereyda Myron) 4 gram powder Take  by mouth three (3) times daily (with meals). Yes Historical Provider   predniSONE (DELTASONE) 10 mg tablet 2 tablets for 5 days then 1 tablet for 5 days then stop 10/24/17  Yes Bibi Masters MD   azithromycin (ZITHROMAX) 250 mg tablet use as directed 10/24/17 10/29/17 Yes Bibi Masters MD   Peak Flow Meter elma 1 Each by Does Not Apply route two (2) times daily as needed. 10/24/17  Yes Bibi Masters MD   albuterol (PROVENTIL HFA, VENTOLIN HFA, PROAIR HFA) 90 mcg/actuation inhaler Take 2 Puffs by inhalation every four (4) hours as needed for Wheezing. 10/22/17  Yes Alexandra Taylor. MD Janessa   predniSONE (STERAPRED DS) 10 mg dose pack See administration instruction per 10mg dose pack 10/20/17  Yes Zhang Hale NP   hydroCHLOROthiazide (HYDRODIURIL) 25 mg tablet Take 1 Tab by mouth daily. Indications: Edema, hypertension 4/28/17  Yes Ame Garcia MD   Cholecalciferol, Vitamin D3, (VITAMIN D3) 2,000 unit cap capsule Take 2,000 Units by mouth daily. Indications: VITAMIN D DEFICIENCY (HIGH DOSE THERAPY) 4/28/17  Yes Ame Garcia MD   amoxicillin-clavulanate (AUGMENTIN) 875-125 mg per tablet Take 1 Tab by mouth two (2) times a day for 10 days. 10/18/17 10/28/17  Zhang Hale NP   cetirizine (ZYRTEC) 10 mg tablet Take 1 Tab by mouth daily.  4/28/17   Ame Garcia MD      Allergies   Allergen Reactions    Shellfish Derived Swelling     Wheezing    Banana Other (comments)     Abdominal pain            Visit Vitals    /78    Pulse 99    Temp 98.3 °F (36.8 °C) (Oral)    Resp 20    Ht 5' 2\" (1.575 m)    Wt 238 lb (108 kg)    LMP 09/28/2017    SpO2 99%    BMI 43.53 kg/m2     Physical Exam   Constitutional: She is oriented to person, place, and time. She appears well-developed and well-nourished. Non-toxic appearance. She does not have a sickly appearance. She does not appear ill. No distress. HENT:   Head: Normocephalic and atraumatic. Right Ear: Hearing and external ear normal. Tympanic membrane is not perforated. No middle ear effusion. Left Ear: Hearing and external ear normal. Tympanic membrane is not perforated. No middle ear effusion. Nose: No rhinorrhea. Mouth/Throat: Uvula is midline, oropharynx is clear and moist and mucous membranes are normal. No uvula swelling. No posterior oropharyngeal erythema. Eyes: Conjunctivae are normal. Right eye exhibits no discharge. Left eye exhibits no discharge. No scleral icterus. Neck: Normal range of motion. Neck supple. No JVD present. Cardiovascular: Normal rate, regular rhythm, normal heart sounds and intact distal pulses. tachycardia   Pulmonary/Chest: Effort normal and breath sounds normal. No stridor. No respiratory distress. She has no wheezes. She has no rales. Abdominal: Soft. Bowel sounds are normal. She exhibits no distension. There is no tenderness. There is no rebound and no guarding. Musculoskeletal: Normal range of motion. She exhibits no edema or tenderness. Neurological: She is alert and oriented to person, place, and time. No cranial nerve deficit. Skin: Skin is warm and dry. She is not diaphoretic. No erythema. Psychiatric: She has a normal mood and affect. Her behavior is normal. Judgment and thought content normal.             No results found for this or any previous visit (from the past 24 hour(s)).     ASSESSMENT AND PLAN:  Patient Active Problem List    Diagnosis Date Noted    Acute bronchitis 10/24/2017    Mild intermittent asthma with acute exacerbation 10/24/2017    Environmental and seasonal allergies 10/24/2017    Localized edema 10/24/2017    H/O extrinsic asthma 10/24/2017    Chronic LBP 2016    Hx of cholecystectomy 2012       ICD-10-CM ICD-9-CM   1. Acute bronchitis, unspecified organism J20.9 466.0   2. Mild intermittent asthma with acute exacerbation J45.21 493.92   3. Environmental and seasonal allergies J30.89 477.8   4. Localized edema R60.0 782.3   5. H/O extrinsic asthma Z87.09 V12.69     Orders Placed This Encounter    REFERRAL TO ALLERGY     Referral Priority:   Routine     Referral Type:   Consultation     Referral Reason:   Specialty Services Required     Referral Location:   [facility]. Erika Rios MD     Referred to Provider:   Joya Baires MD     Requested Specialty:   Allergy    [address] [facility] FLOWSHEET     Order Specific Question:   After how many readings would you like to receive a notification of this patient's entries? Answer:   7    cholestyramine-sucrose (QUESTRAN) 4 gram powder     Sig: Take  by mouth three (3) times daily (with meals).  predniSONE (DELTASONE) 10 mg tablet     Si tablets for 5 days then 1 tablet for 5 days then stop     Dispense:  15 Tab     Refill:  0    azithromycin (ZITHROMAX) 250 mg tablet     Sig: use as directed     Dispense:  6 Tab     Refill:  0    Peak Flow Meter elma     Si Each by Does Not Apply route two (2) times daily as needed. Dispense:  1 Device     Refill:  0       Diagnoses and all orders for this visit:    1. Acute bronchitis, unspecified organism  S/p augmentin for 8 days,  continue to have a cough   Start Z pack   -     REFERRAL TO ALLERGY  -     [facility] MYCHART PEAK FLOW FLOWSHEET    2.  Mild intermittent asthma with acute exacerbation  Increase steroids back to 20 mg for 5 days 10 mg po for 5 days   - REF[facility]AL [facility] ALLERGY  -     [facility]    3. Environmental and seasonal allergies  -     REFERRAL TO ALLERGY  -     [facility]    4. Localized edema PRN HCTZ use . 5. H/O extrinsic asthma    Other orders  -     predniSONE (DELTASONE) 10 mg tablet; 2 tablets for 5 days then 1 tablet for 5 days then stop  -     azithromycin (ZITHROMAX) 250 mg tablet; use as directed  -     Peak Flow Meter elma; 1 Each by Does Not Apply route two (2) times daily as needed. May need inhaler corticosteriods  Discussed with patient it will take maybe 4 weeks for symptoms to improve due to underlying asthma and URI. CXR reviewed was negative. lab results and schedule of future lab studies reviewed with patient  reviewed diet, exercise and weight control  reviewed medications and side effects in detail      Follow-up Disposition:  Return in about 4 weeks (around 11/21/2017), or if symptoms worsen or fail to improve, for Chronic medical problems follow up. Disclaimer:  Advised her to call back or return to office if symptoms worsen/change/persist.  Discussed expected course/resolution/complications of diagnosis in detail with patient. Medication risks/benefits/alternatives discussed with patient. She was given an after visit summary which includes diagnoses, current medications, & vitals. Discussed patient instructions and advised to read to all patient instructions regarding care. She expressed understanding with the diagnosis and plan.

## 2017-10-24 NOTE — PATIENT INSTRUCTIONS
Asthma Action Plan: After Your Visit  Your Care Instructions  An asthma action plan is based on peak flow and asthma symptoms. Sorting symptoms and peak flow into red, yellow, and green \"zones\" can help you know how bad your asthma is and what actions you should take. Work with your doctor to make your plan. An action plan may include:  · The peak flow readings and symptoms for each zone. · What medicines to take in each zone. · When to call a doctor. · A list of emergency contact numbers. · A list of your asthma triggers. Follow-up care is a key part of your treatment and safety. Be sure to make and go to all appointments, and call your doctor if you are having problems. It's also a good idea to know your test results and keep a list of the medicines you take. How can you care for yourself at home? · Take your daily medicines to help minimize long-term damage and avoid asthma attacks. · Check your peak flow every morning and evening. This is the best way to know how well your lungs are working. · Check your action plan to see what zone you are in.  ¨ If you are in the green zone, keep taking your daily asthma medicines as prescribed. ¨ If you are in the yellow zone, you may be having or will soon have an asthma attack. You may not have any symptoms, but your lungs are not working as well as they should. Take the medicines listed in your action plan. If you stay in the yellow zone, your doctor may need to increase the dose or add a medicine. ¨ If you are in the red zone, follow your action plan. If your symptoms or peak flow don't improve soon, you may need to go to the emergency room or be admitted to the hospital.  · Use an asthma diary. Write down your peak flow readings in the asthma diary. If you have an attack, write down what caused it (if you know), the symptoms, and what medicine you took.   · Make sure you know how and when to call your doctor or go to the hospital.  · Take both the asthma action plan and the asthma diaryalong with your peak flow meter and medicineswhen you see your doctor. Tell your doctor if you are having trouble following your action plan. When should you call for help? Call 911 anytime you think you may need emergency care. For example, call if:  · You have severe trouble breathing. Call your doctor now or seek immediate medical care if:  · Your symptoms do not get better after you have followed your asthma action plan. · You cough up yellow, dark brown, or bloody mucus (sputum). Watch closely for changes in your health, and be sure to contact your doctor if:  · Your coughing and wheezing get worse. · You need to use quick-relief medicine on more than 2 days a week (unless it is just for exercise). · You need help figuring out what is triggering your asthma attacks. Where can you learn more? Go to Xyo.be  Enter B511 in the search box to learn more about \"Asthma Action Plan: After Your Visit. \"   © 7811-2901 Health[employer]. Care instructions adapted under license by [address] [place] [address] [employer] (which disclaims liability or warranty for this information). This care instruction is for use with your licensed healthcare professional. If you have questions about a medical condition or this instruction, always ask your healthcare professional. [place] [address] any warranty or liability for your use of this information. Content Version: 16.2.902841; Last Revised: March 9, 2012                 Bronchitis: Care Instructions  Your Care Instructions    Bronchitis is inflammation of the bronchial tubes, which carry air to the lungs. The tubes swell and produce mucus, or phlegm. The mucus and inflamed bronchial tubes make you cough. You may have trouble breathing. Most cases of bronchitis are caused by viruses like those that cause colds. Antibiotics usually do not help and they may be harmful.   Bronchitis usually develops rapidly and lasts about 2 to 3 weeks in otherwise healthy people. Follow-up care is a key part of your treatment and safety. Be sure to make and go to all appointments, and call your doctor if you are having problems. It's also a good idea to know your test results and keep a list of the medicines you take. How can you care for yourself at home? · Take all medicines exactly as prescribed. Call your doctor if you think you are having a problem with your medicine. · Get some extra rest.  · Take an over-the-counter pain medicine, such as acetaminophen (Tylenol), ibuprofen (Advil, Motrin), or naproxen (Aleve) to reduce fever and relieve body aches. Read and follow all instructions on the label. · Do not take two or more pain medicines at the same time unless the doctor told you to. Many pain medicines have acetaminophen, which is Tylenol. Too much acetaminophen (Tylenol) can be harmful. · Take an over-the-counter cough medicine that contains dextromethorphan to help quiet a dry, hacking cough so that you can sleep. Avoid cough medicines that have more than one active ingredient. Read and follow all instructions on the label. · Breathe moist air from a humidifier, hot shower, or sink filled with hot water. The heat and moisture will thin mucus so you can cough it out. · Do not smoke. Smoking can make bronchitis worse. If you need help quitting, talk to your doctor about stop-smoking programs and medicines. These can increase your chances of quitting for good. When should you call for help? Call 911 anytime you think you may need emergency care. For example, call if:  · You have severe trouble breathing. Call your doctor now or seek immediate medical care if:  · You have new or worse trouble breathing. · You cough up dark brown or bloody mucus (sputum). · You have a new or higher fever. · You have a new rash.   Watch closely for changes in your health, and be sure to contact your doctor if:  · You cough more deeply or more often, especially if you notice more mucus or a change in the color of your mucus. · You are not getting better as expected. Where can you learn more? Go to http://jamin-neisha.info/. Enter H333 in the search box to learn more about \"Bronchitis: Care Instructions. \"  Current as of: March 25, 2017  Content Version: 11.3  © 5753-2174 [employer]. Care instructions adapted under license by [employer] (which disclaims liability or warranty for this information). If you have questions about a medical condition or this instruction, always ask your healthcare professional. [place] [address] any warranty or liability for your use of this information. Asthma Attack: Care Instructions  Your Care Instructions    During an asthma attack, the airways swell and narrow. This makes it hard to breathe. Severe asthma attacks can be life-threatening, but you can help prevent them by keeping your asthma under control and treating symptoms before they get bad. Symptoms include being short of breath, having chest tightness, coughing, and wheezing. Noting and treating these symptoms can also help you avoid future trips to the emergency room. The doctor has checked you carefully, but problems can develop later. If you notice any problems or new symptoms, get medical treatment right away. Follow-up care is a key part of your treatment and safety. Be sure to make and go to all appointments, and call your doctor if you are having problems. It's also a good idea to know your test results and keep a list of the medicines you take. How can you care for yourself at home? · Follow your asthma action plan to prevent and treat attacks. If you don't have an asthma action plan, work with your doctor to create one. · Take your asthma medicines exactly as prescribed. Talk to your doctor right away if you have any questions about how to take them.   ¨ Use your quick-relief medicine when you have symptoms of an attack. Quick-relief medicine is usually an albuterol inhaler. Some people need to use quick-relief medicine before they exercise. ¨ Take your controller medicine every day, not just when you have symptoms. Controller medicine is usually an inhaled corticosteroid. The goal is to prevent problems before they occur. Don't use your controller medicine to treat an attack that has already started. It doesn't work fast enough to help. ¨ If your doctor prescribed corticosteroid pills to use during an attack, take them exactly as prescribed. It may take hours for the pills to work, but they may make the episode shorter and help you breathe better. ¨ Keep your quick-relief medicine with you at all times. · Talk to your doctor before using other medicines. Some medicines, such as aspirin, can cause asthma attacks in some people. · If you have a peak flow meter, use it to check how well you are breathing. This can help you predict when an asthma attack is going to occur. Then you can take medicine to prevent the asthma attack or make it less severe. · Do not smoke or allow others to smoke around you. Avoid smoky places. Smoking makes asthma worse. If you need help quitting, talk to your doctor about stop-smoking programs and medicines. These can increase your chances of quitting for good. · Learn what triggers an asthma attack for you, and avoid the triggers when you can. Common triggers include colds, smoke, air pollution, dust, pollen, mold, pets, cockroaches, stress, and cold air. · Avoid colds and the flu. Get a pneumococcal vaccine shot. If you have had one before, ask your doctor if you need a second dose. Get a flu vaccine every fall. If you must be around people with colds or the flu, wash your hands often. When should you call for help? Call 911 anytime you think you may need emergency care. For example, call if:  · You have severe trouble breathing.   Call your doctor now or seek immediate medical care if:  · Your symptoms do not get better after you have followed your asthma action plan. · You have new or worse trouble breathing. · Your coughing and wheezing get worse. · You cough up dark brown or bloody mucus (sputum). · You have a new or higher fever. Watch closely for changes in your health, and be sure to contact your doctor if:  · You need to use quick-relief medicine on more than 2 days a week (unless it is just for exercise). · You cough more deeply or more often, especially if you notice more mucus or a change in the color of your mucus. · You are not getting better as expected. Where can you learn more? Go to http://jamin-neisha.info/. Enter X432 in the search box to learn more about \"Asthma Attack: Care Instructions. \"  Current as of: March 25, 2017  Content Version: 11.3  © 2529-5652 [employer]. Care instructions adapted under license by [employer] (which disclaims liability or warranty for this information). If you have questions about a medical condition or this instruction, always ask your healthcare professional. [address] any warranty or liability for your use of this information.

## 2017-11-15 ENCOUNTER — TELEPHONE (OUTPATIENT)
Dept: FAMILY MEDICINE CLINIC | Age: 27
End: 2017-11-15

## 2017-11-15 NOTE — TELEPHONE ENCOUNTER
Pt wants to come in for fatigue & heart palpitations x 1 yr  Feels it is worsening   States she hasnt addressed it when she has been in over the last year     PSR offered 11/30/17 and she said she would have to check her work schedule and call back       Best number to reach her is [phone]

## 2017-11-16 NOTE — TELEPHONE ENCOUNTER
Message left on patient voice mail checking on patient status. Message notified patient if she would still like to be seen please contact office and schedule appointment.

## 2017-12-04 RX ORDER — METHYLPREDNISOLONE 4 MG/1
TABLET ORAL
Qty: 1 DOSE PACK | Refills: 0 | Status: SHIPPED | OUTPATIENT
Start: 2017-12-04 | End: 2017-12-29 | Stop reason: ALTCHOICE

## 2017-12-04 NOTE — PROGRESS NOTES
Verbal order from Dr. Jone Salinas to send medrol dose pack to pharmacy of choice. This has been done.

## 2017-12-12 RX ORDER — NEOMYCIN SULFATE, POLYMYXIN B SULFATE AND HYDROCORTISONE 10; 3.5; 1 MG/ML; MG/ML; [USP'U]/ML
3 SUSPENSION/ DROPS AURICULAR (OTIC)
Qty: 10 ML | Refills: 0 | Status: SHIPPED | OUTPATIENT
Start: 2017-12-12 | End: 2017-12-29 | Stop reason: ALTCHOICE

## 2017-12-29 ENCOUNTER — OFFICE VISIT (OUTPATIENT)
Dept: FAMILY MEDICINE CLINIC | Age: 27
End: 2017-12-29

## 2017-12-29 VITALS
HEART RATE: 84 BPM | HEIGHT: 62 IN | OXYGEN SATURATION: 99 % | BODY MASS INDEX: 44.53 KG/M2 | SYSTOLIC BLOOD PRESSURE: 120 MMHG | WEIGHT: 242 LBS | TEMPERATURE: 98 F | DIASTOLIC BLOOD PRESSURE: 78 MMHG | RESPIRATION RATE: 20 BRPM

## 2017-12-29 DIAGNOSIS — E66.01 OBESITY, MORBID (HCC): ICD-10-CM

## 2017-12-29 DIAGNOSIS — E78.5 DYSLIPIDEMIA (HIGH LDL; LOW HDL): ICD-10-CM

## 2017-12-29 DIAGNOSIS — Z00.00 ENCOUNTER FOR ANNUAL PHYSICAL EXAM: Primary | ICD-10-CM

## 2017-12-29 DIAGNOSIS — D75.89 MACROCYTOSIS: ICD-10-CM

## 2017-12-29 DIAGNOSIS — G47.30 SLEEP APNEA IN ADULT: ICD-10-CM

## 2017-12-29 DIAGNOSIS — R73.02 IGT (IMPAIRED GLUCOSE TOLERANCE): ICD-10-CM

## 2017-12-29 DIAGNOSIS — E55.9 HYPOVITAMINOSIS D: ICD-10-CM

## 2017-12-29 DIAGNOSIS — Z13.29 SCREENING FOR THYROID DISORDER: ICD-10-CM

## 2017-12-29 PROBLEM — J20.9 ACUTE BRONCHITIS: Status: RESOLVED | Noted: 2017-10-24 | Resolved: 2017-12-29

## 2017-12-29 NOTE — PROGRESS NOTES
Chief Complaint   Patient presents with    Fatigue     Patient here today  fatigue and having a foggy feeling  x on going.

## 2017-12-29 NOTE — MR AVS SNAPSHOT
Visit Information Date & Time Provider Department Dept. Phone Encounter #  
 12/29/2017  7:45 AM Erma Douglass MD [facility] (formerly [facility]) at [address] [id number] Follow-up Instructions Return 2-3 weeks, for f/u results. Upcoming Health Maintenance Date Due  
 PAP AKA CERVICAL CYTOLOGY 6/1/2018 DTaP/Tdap/Td series (2 - Td) 8/10/2027 Allergies as of 12/29/2017  Review Complete On: 12/29/2017 By: Erma Douglass MD  
  
 Severity Noted Reaction Type Reactions Shellfish Derived High 07/22/2015    Swelling Wheezing Banana  10/22/2017    Other (comments) Abdominal pain Current Immunizations  Never Reviewed Name Date Hep B Vaccine 11/29/2000, 6/12/2000, 5/8/2000 Hib 3/3/1992 Influenza Vaccine (Quad) PF 10/7/2016 MMR 9/13/1993, 3/27/1990 Poliovirus vaccine 9/13/1993, 3/3/1992, 5/31/1989, 1/19/1989 Not reviewed this visit You Were Diagnosed With   
  
 Codes Comments Encounter for annual physical exam    -  Primary ICD-10-CM: Z00.00 ICD-9-CM: V70.0 Obesity, morbid ([facility] [address]     ICD-10-CM: E66.01 
ICD-9-CM: 278.01 Hypovitaminosis D     ICD-10-CM: E55.9 ICD-9-CM: 268.9 Sleep apnea in adult     ICD-10-CM: G47.30 ICD-9-CM: 327.23 Dyslipidemia (high LDL; low HDL)     ICD-10-CM: E78.4 ICD-9-CM: 272.4 Screening for thyroid disorder     ICD-10-CM: Z13.29 ICD-9-CM: V77.0 IGT (impaired glucose tolerance)     ICD-10-CM: R73.02 
ICD-9-CM: 790.22 Macrocytosis     ICD-10-CM: D75.89 ICD-9-CM: 289.89 Vitals BP Pulse Temp Resp Height(growth percentile) Weight(growth percentile) 120/78 84 98 °F (36.7 °C) (Oral) 20 5' 2\" (1.575 m) 242 lb (109.8 kg) LMP SpO2 BMI OB Status Smoking Status 12/21/2017 99% 44.26 kg/m2 Having regular periods Never Smoker Vitals History BMI and BSA Data Body Mass Index Body Surface Area  
 44.26 kg/m 2 2.19 m 2 Preferred Pharmacy Pharmacy Name Phone [facility] [address] [phone] Your Updated Medication List  
  
   
This list is accurate as of: 12/29/17  8:24 AM.  Always use your most recent med list.  
  
  
  
  
 albuterol 90 mcg/actuation inhaler Commonly known as:  PROVENTIL HFA, VENTOLIN HFA, PROAIR HFA Take 2 Puffs by inhalation every four (4) hours as needed for Wheezing. cetirizine 10 mg tablet Commonly known as:  ZYRTEC Take 1 Tab by mouth daily. Cholecalciferol (Vitamin D3) 2,000 unit Cap capsule Commonly known as:  VITAMIN D3 Take 2,000 Units by mouth daily. Indications: VITAMIN D DEFICIENCY (HIGH DOSE THERAPY)  
  
 hydroCHLOROthiazide 25 mg tablet Commonly known as:  HYDRODIURIL Take 1 Tab by mouth daily. Indications: Edema, hypertension Peak Flow Meter Kathy  
1 Each by Does Not Apply route two (2) times daily as needed. QUESTRAN 4 gram powder Generic drug:  cholestyramine-sucrose Take  by mouth three (3) times daily (with meals). We Performed the Following CBC WITH AUTOMATED DIFF [02109 CPT(R)] HEMOGLOBIN A1C WITH EAG [22697 CPT(R)] LIPID PANEL [91276 CPT(R)] METABOLIC PANEL, COMPREHENSIVE [87417 CPT(R)] REFERRAL TO SLEEP STUDIES [REF99 Custom] Comments:  
 Please evaluate for day time sleepiness TSH 3RD GENERATION [00400 CPT(R)] URINALYSIS W/ RFLX MICROSCOPIC [01043 CPT(R)] VITAMIN B12 & FOLATE [08931 CPT(R)] VITAMIN D, 25 HYDROXY [id number] CPT(R)] Follow-up Instructions Return 2-3 weeks, for f/u results. Referral Information Referral ID Referred By Referred To  
  
 [id number], MD   
   [address] [facility] [address] [place], [address] Phone: [phone] Fax: [phone] Visits Status Start Date End Date 1 New Request 12/29/17 12/29/18 If your referral has a status of pending review or denied, additional information will be sent to support the outcome of this decision. Patient Instructions Sleep Apnea: Care Instructions Your Care Instructions Sleep apnea means that you frequently stop breathing for 10 seconds or longer during sleep. It can be mild to severe, based on the number of times an hour that you stop breathing or have slowed breathing. Blocked or narrowed airways in your nose, mouth, or throat can cause sleep apnea. Your airway can become blocked when your throat muscles and tongue relax during sleep. You can treat sleep apnea at home by making lifestyle changes. You also can use a CPAP breathing machine that keeps tissues in the throat from blocking your airway. Or your doctor may suggest that you use a breathing device while you sleep. It helps keep your airway open. This could be a device that you put in your mouth. Other examples include strips or disks that you use on your nose. In some cases, surgery may be needed to remove enlarged tissues in the throat. Follow-up care is a key part of your treatment and safety. Be sure to make and go to all appointments, and call your doctor if you are having problems. It's also a good idea to know your test results and keep a list of the medicines you take. How can you care for yourself at home? · Lose weight, if needed. It may reduce the number of times you stop breathing or have slowed breathing. · Sleep on your side. It may stop mild apnea. If you tend to roll onto your back, sew a pocket in the back of your pajama top. Put a tennis ball into the pocket, and stitch the pocket shut. This will help keep you from sleeping on your back. · Avoid alcohol and medicines such as sleeping pills and sedatives before bed. · Do not smoke. Smoking can make sleep apnea worse. If you need help quitting, talk to your doctor about stop-smoking programs and medicines. These can increase your chances of quitting for good. · Prop up the head of your bed 4 to 6 inches by putting bricks under the legs of the bed. · Treat breathing problems, such as a stuffy nose, caused by a cold or allergies. · Try a continuous positive airway pressure (CPAP) breathing machine if your doctor recommends it. The machine keeps your airway open when you sleep. · If CPAP does not work for you, ask your doctor if you can try other breathing machines. A bilevel positive airway pressure machine uses one type of air pressure for breathing in and another type for breathing out. Another device raises or lowers air pressure as needed while you breathe. · Talk to your doctor if: 
¨ Your nose feels dry or bleeds when you use one of these machines. You may need to increase moisture in the air. A humidifier may help. ¨ Your nose is runny or stuffy from using a breathing machine. Decongestants or a corticosteroid nasal spray may help. ¨ You are sleepy during the day and it gets in the way of the normal things you do. Do not drive when you are drowsy. When should you call for help? Watch closely for changes in your health, and be sure to contact your doctor if: 
? · You still have sleep apnea even though you have made lifestyle changes. ? · You are thinking of trying a device such as CPAP. ? · You are having problems using a CPAP or similar machine. Where can you learn more? Go to http://jamin-neisha.info/. Enter G630 in the search box to learn more about \"Sleep Apnea: Care Instructions. \" Current as of: May 12, 2017 Content Version: 11.4 © 6130-3539 [employer]. Care instructions adapted under license by [employer] (which disclaims liability or warranty for this information).  If you have questions about a medical condition or this instruction, always ask your healthcare professional. [employer] disclaims any warranty or liability for your use of this information. Introducing [facility] & HEALTH SERVICES! Dear Herbie Eldridge: 
Thank you for requesting a Simmr account. Our records indicate that you already have an active Simmr account. You can access your account anytime at [email or web address] Did you know that you can access your hospital and ER discharge instructions at any time in Simmr? You can also review all of your test results from your hospital stay or ER visit. Additional Information If you have questions, please visit the Frequently Asked Questions section of the Simmr website at [email or web address] Remember, Simmr is NOT to be used for urgent needs. For medical emergencies, dial 911. Now available from your iPhone and Android! Please provide this summary of care documentation to your next provider. Your primary care clinician is listed as Lurdes Cherry. If you have any questions after today's visit, please call [phone].

## 2017-12-29 NOTE — PATIENT INSTRUCTIONS
Sleep Apnea: Care Instructions  Your Care Instructions    Sleep apnea means that you frequently stop breathing for 10 seconds or longer during sleep. It can be mild to severe, based on the number of times an hour that you stop breathing or have slowed breathing. Blocked or narrowed airways in your nose, mouth, or throat can cause sleep apnea. Your airway can become blocked when your throat muscles and tongue relax during sleep. You can treat sleep apnea at home by making lifestyle changes. You also can use a CPAP breathing machine that keeps tissues in the throat from blocking your airway. Or your doctor may suggest that you use a breathing device while you sleep. It helps keep your airway open. This could be a device that you put in your mouth. Other examples include strips or disks that you use on your nose. In some cases, surgery may be needed to remove enlarged tissues in the throat. Follow-up care is a key part of your treatment and safety. Be sure to make and go to all appointments, and call your doctor if you are having problems. It's also a good idea to know your test results and keep a list of the medicines you take. How can you care for yourself at home? · Lose weight, if needed. It may reduce the number of times you stop breathing or have slowed breathing. · Sleep on your side. It may stop mild apnea. If you tend to roll onto your back, sew a pocket in the back of your pajama top. Put a tennis ball into the pocket, and stitch the pocket shut. This will help keep you from sleeping on your back. · Avoid alcohol and medicines such as sleeping pills and sedatives before bed. · Do not smoke. Smoking can make sleep apnea worse. If you need help quitting, talk to your doctor about stop-smoking programs and medicines. These can increase your chances of quitting for good. · Prop up the head of your bed 4 to 6 inches by putting bricks under the legs of the bed.   · Treat breathing problems, such as a stuffy nose, caused by a cold or allergies. · Try a continuous positive airway pressure (CPAP) breathing machine if your doctor recommends it. The machine keeps your airway open when you sleep. · If CPAP does not work for you, ask your doctor if you can try other breathing machines. A bilevel positive airway pressure machine uses one type of air pressure for breathing in and another type for breathing out. Another device raises or lowers air pressure as needed while you breathe. · Talk to your doctor if:  ¨ Your nose feels dry or bleeds when you use one of these machines. You may need to increase moisture in the air. A humidifier may help. ¨ Your nose is runny or stuffy from using a breathing machine. Decongestants or a corticosteroid nasal spray may help. ¨ You are sleepy during the day and it gets in the way of the normal things you do. Do not drive when you are drowsy. When should you call for help? Watch closely for changes in your health, and be sure to contact your doctor if:  ? · You still have sleep apnea even though you have made lifestyle changes. ? · You are thinking of trying a device such as CPAP. ? · You are having problems using a CPAP or similar machine. Where can you learn more? Go to http://jamin-neisha.info/. Enter D560 in the search box to learn more about \"Sleep Apnea: Care Instructions. \"  Current as of: May 12, 2017  Content Version: 11.4  © 0594-8818 [employer]. Care instructions adapted under license by [employer] (which disclaims liability or warranty for this information). If you have questions about a medical condition or this instruction, always ask your healthcare professional. [address] any warranty or liability for your use of this information.

## 2017-12-29 NOTE — PROGRESS NOTES
Chief Complaint   Patient presents with    Fatigue     HPI:  Elisa Jordan is a 32 y.o.  female presents for a long overdue follow up. Patient is complaining of fatigue and excessive daytime sleepiness. Her complains of her snoring and gasping and suddenly waking up from sleep. Patient has no other complaints. .  Review of Systems  As per hpi    Past Medical History:   Diagnosis Date    Asthma     exercise / cold induced doesn't use inhaler    Interstitial cystitis      Past Surgical History:   Procedure Laterality Date    HX CHOLECYSTECTOMY       Social History     Social History    Marital status: SINGLE     Spouse name: N/A    Number of children: N/A    Years of education: N/A     Social History Main Topics    Smoking status: Never Smoker    Smokeless tobacco: Never Used    Alcohol use No    Drug use: No    Sexual activity: Yes     Partners: Male     Birth control/ protection: IUD     Other Topics Concern    None     Social History Narrative     Current Outpatient Prescriptions   Medication Sig Dispense Refill    cholestyramine-sucrose (QUESTRAN) 4 gram powder Take  by mouth three (3) times daily (with meals).  Peak Flow Meter elma 1 Each by Does Not Apply route two (2) times daily as needed. 1 Device 0    albuterol (PROVENTIL HFA, VENTOLIN HFA, PROAIR HFA) 90 mcg/actuation inhaler Take 2 Puffs by inhalation every four (4) hours as needed for Wheezing. 1 Inhaler 0    hydroCHLOROthiazide (HYDRODIURIL) 25 mg tablet Take 1 Tab by mouth daily. Indications: Edema, hypertension 30 Tab 5    Cholecalciferol, Vitamin D3, (VITAMIN D3) 2,000 unit cap capsule Take 2,000 Units by mouth daily. Indications: VITAMIN D DEFICIENCY (HIGH DOSE THERAPY) 90 Cap 5    cetirizine (ZYRTEC) 10 mg tablet Take 1 Tab by mouth daily.  30 Tab 4     Allergies   Allergen Reactions    Shellfish Derived Swelling     Wheezing    Banana Other (comments)     Abdominal pain       Objective:  Visit Vitals    /78  Pulse 84    Temp 98 °F (36.7 °C) (Oral)    Resp 20    Ht 5' 2\" (1.575 m)    Wt 242 lb (109.8 kg)    LMP 12/21/2017    SpO2 99%    BMI 44.26 kg/m2     Physical Exam:   General appearance - alert,oriented X 3, well appearing in no distress  EYE-LINETTE, EOMI  Neck - supple, no thyromegaly  Chest - clear to auscultation, no wheezes, rales or rhonchi  Heart - normal rate, regular rhythm, normal blood pressure   Abdomen - soft, nontender, nondistended, no organomegaly  Lymph- no adenopathy palpable  Ext-peripheral pulses normal, no pedal edema  Neuro -alert, oriented, normal speech, no focal findings   Back-full range of motion, no tenderness, palpable spasm or pain on motion     Results for orders placed or performed during the hospital encounter of 10/22/17   CBC WITH AUTOMATED DIFF   Result Value Ref Range    WBC 15.9 (H) 3.6 - 11.0 K/uL    RBC 4.40 3.80 - 5.20 M/uL    HGB 12.6 11.5 - 16.0 g/dL    HCT 37.6 35.0 - 47.0 %    MCV 85.5 80.0 - 99.0 FL    MCH 28.6 26.0 - 34.0 PG    MCHC 33.5 30.0 - 36.5 g/dL    RDW 14.0 11.5 - 14.5 %    PLATELET 848 218 - 802 K/uL    NEUTROPHILS 86 (H) 32 - 75 %    LYMPHOCYTES 12 12 - 49 %    MONOCYTES 2 (L) 5 - 13 %    EOSINOPHILS 0 0 - 7 %    BASOPHILS 0 0 - 1 %    ABS. NEUTROPHILS 13.6 (H) 1.8 - 8.0 K/UL    ABS. LYMPHOCYTES 1.9 0.8 - 3.5 K/UL    ABS. MONOCYTES 0.4 0.0 - 1.0 K/UL    ABS. EOSINOPHILS 0.0 0.0 - 0.4 K/UL    ABS.  BASOPHILS 0.0 0.0 - 0.1 K/UL   METABOLIC PANEL, BASIC   Result Value Ref Range    Sodium 138 136 - 145 mmol/L    Potassium 4.1 3.5 - 5.1 mmol/L    Chloride 108 97 - 108 mmol/L    CO2 19 (L) 21 - 32 mmol/L    Anion gap 11 5 - 15 mmol/L    Glucose 128 (H) 65 - 100 mg/dL    BUN 14 6 - 20 MG/DL    Creatinine 0.81 0.55 - 1.02 MG/DL    BUN/Creatinine ratio 17 12 - 20      GFR est AA >60 >60 ml/min/1.73m2    GFR est non-AA >60 >60 ml/min/1.73m2    Calcium 9.3 8.5 - 10.1 MG/DL   D DIMER   Result Value Ref Range    D-dimer 0.29 0.00 - 0.65 mg/L FEU   TROPONIN I Result Value Ref Range    Troponin-I, Qt. <0.04 <0.05 ng/mL   BETA HCG, QT   Result Value Ref Range    Beta HCG, QT <1 0 - 6 MIU/ML   EKG, 12 LEAD, INITIAL   Result Value Ref Range    Ventricular Rate 86 BPM    Atrial Rate 86 BPM    P-R Interval 160 ms    QRS Duration 84 ms    Q-T Interval 328 ms    QTC Calculation (Bezet) 392 ms    Calculated P Axis 49 degrees    Calculated R Axis 8 degrees    Calculated T Axis 24 degrees    Diagnosis       Normal sinus rhythm  Normal ECG  No previous ECGs available  Confirmed by Vanesa Cleary ([id number]) on 10/23/2017 6:17:34 AM       Assessment/Plan:    ICD-10-CM ICD-9-CM    1. Encounter for annual physical exam Q91.94 J44.4 METABOLIC PANEL, COMPREHENSIVE      URINALYSIS W/ RFLX MICROSCOPIC      CBC WITH AUTOMATED DIFF   2. Obesity, morbid ([facility] [address] E66.01 278.01    3. Hypovitaminosis D E55.9 268.9 VITAMIN D, 25 HYDROXY   4. Sleep apnea in adult G47.30 327.23 REFERRAL TO SLEEP STUDIES   5. Dyslipidemia (high LDL; low HDL) E78.4 272.4 LIPID PANEL   6. Screening for thyroid disorder Z13.29 V77.0 TSH 3RD GENERATION   7. IGT (impaired glucose tolerance) R73.02 790.22 HEMOGLOBIN A1C WITH EAG   8. Macrocytosis D75.89 289.89 VITAMIN B12 & FOLATE     Patient Instructions          Sleep Apnea: Care Instructions  Your Care Instructions    Sleep apnea means that you frequently stop breathing for 10 seconds or longer during sleep. It can be mild to severe, based on the number of times an hour that you stop breathing or have slowed breathing. Blocked or narrowed airways in your nose, mouth, or throat can cause sleep apnea. Your airway can become blocked when your throat muscles and tongue relax during sleep. You can treat sleep apnea at home by making lifestyle changes. You also can use a CPAP breathing machine that keeps tissues in the throat from blocking your airway. Or your doctor may suggest that you use a breathing device while you sleep. It helps keep your airway open.  This could be a device that you put in your mouth. Other examples include strips or disks that you use on your nose. In some cases, surgery may be needed to remove enlarged tissues in the throat. Follow-up care is a key part of your treatment and safety. Be sure to make and go to all appointments, and call your doctor if you are having problems. It's also a good idea to know your test results and keep a list of the medicines you take. How can you care for yourself at home? · Lose weight, if needed. It may reduce the number of times you stop breathing or have slowed breathing. · Sleep on your side. It may stop mild apnea. If you tend to roll onto your back, sew a pocket in the back of your pajama top. Put a tennis ball into the pocket, and stitch the pocket shut. This will help keep you from sleeping on your back. · Avoid alcohol and medicines such as sleeping pills and sedatives before bed. · Do not smoke. Smoking can make sleep apnea worse. If you need help quitting, talk to your doctor about stop-smoking programs and medicines. These can increase your chances of quitting for good. · Prop up the head of your bed 4 to 6 inches by putting bricks under the legs of the bed. · Treat breathing problems, such as a stuffy nose, caused by a cold or allergies. · Try a continuous positive airway pressure (CPAP) breathing machine if your doctor recommends it. The machine keeps your airway open when you sleep. · If CPAP does not work for you, ask your doctor if you can try other breathing machines. A bilevel positive airway pressure machine uses one type of air pressure for breathing in and another type for breathing out. Another device raises or lowers air pressure as needed while you breathe. · Talk to your doctor if:  ¨ Your nose feels dry or bleeds when you use one of these machines. You may need to increase moisture in the air. A humidifier may help. ¨ Your nose is runny or stuffy from using a breathing machine.  Decongestants or a corticosteroid nasal spray may help. ¨ You are sleepy during the day and it gets in the way of the normal things you do. Do not drive when you are drowsy. When should you call for help? Watch closely for changes in your health, and be sure to contact your doctor if:  ? · You still have sleep apnea even though you have made lifestyle changes. ? · You are thinking of trying a device such as CPAP. ? · You are having problems using a CPAP or similar machine. Where can you learn more? Go to http://jamin-neisha.info/. Enter P413 in the search box to learn more about \"Sleep Apnea: Care Instructions. \"  Current as of: May 12, 2017  Content Version: 11.4  © 7179-2592 [employer]. Care instructions adapted under license by [employer] (which disclaims liability or warranty for this information). If you have questions about a medical condition or this instruction, always ask your healthcare professional. [place] [address] any warranty or liability for your use of this information. Follow-up Disposition:  Return 2-3 weeks, for f/u results.

## 2017-12-30 LAB
25(OH)D3+25(OH)D2 SERPL-MCNC: 32.5 NG/ML (ref 30–100)
ALBUMIN SERPL-MCNC: 4.1 G/DL (ref 3.5–5.5)
ALBUMIN/GLOB SERPL: 1.4 {RATIO} (ref 1.2–2.2)
ALP SERPL-CCNC: 80 IU/L (ref 39–117)
ALT SERPL-CCNC: 14 IU/L (ref 0–32)
APPEARANCE UR: CLEAR
AST SERPL-CCNC: 13 IU/L (ref 0–40)
BASOPHILS # BLD AUTO: 0.1 X10E3/UL (ref 0–0.2)
BASOPHILS NFR BLD AUTO: 1 %
BILIRUB SERPL-MCNC: 0.3 MG/DL (ref 0–1.2)
BILIRUB UR QL STRIP: NEGATIVE
BUN SERPL-MCNC: 11 MG/DL (ref 6–20)
BUN/CREAT SERPL: 16 (ref 9–23)
CALCIUM SERPL-MCNC: 9.3 MG/DL (ref 8.7–10.2)
CHLORIDE SERPL-SCNC: 99 MMOL/L (ref 96–106)
CHOLEST SERPL-MCNC: 153 MG/DL (ref 100–199)
CO2 SERPL-SCNC: 26 MMOL/L (ref 18–29)
COLOR UR: YELLOW
CREAT SERPL-MCNC: 0.67 MG/DL (ref 0.57–1)
EOSINOPHIL # BLD AUTO: 0.4 X10E3/UL (ref 0–0.4)
EOSINOPHIL NFR BLD AUTO: 4 %
ERYTHROCYTE [DISTWIDTH] IN BLOOD BY AUTOMATED COUNT: 13.8 % (ref 12.3–15.4)
EST. AVERAGE GLUCOSE BLD GHB EST-MCNC: 111 MG/DL
FOLATE SERPL-MCNC: 15.1 NG/ML
GLOBULIN SER CALC-MCNC: 2.9 G/DL (ref 1.5–4.5)
GLUCOSE SERPL-MCNC: 88 MG/DL (ref 65–99)
GLUCOSE UR QL: NEGATIVE
HBA1C MFR BLD: 5.5 % (ref 4.8–5.6)
HCT VFR BLD AUTO: 38.9 % (ref 34–46.6)
HDLC SERPL-MCNC: 44 MG/DL
HGB BLD-MCNC: 12.8 G/DL (ref 11.1–15.9)
HGB UR QL STRIP: NEGATIVE
IMM GRANULOCYTES # BLD: 0 X10E3/UL (ref 0–0.1)
IMM GRANULOCYTES NFR BLD: 0 %
KETONES UR QL STRIP: NEGATIVE
LDLC SERPL CALC-MCNC: 87 MG/DL (ref 0–99)
LEUKOCYTE ESTERASE UR QL STRIP: NEGATIVE
LYMPHOCYTES # BLD AUTO: 2.4 X10E3/UL (ref 0.7–3.1)
LYMPHOCYTES NFR BLD AUTO: 27 %
MCH RBC QN AUTO: 29.3 PG (ref 26.6–33)
MCHC RBC AUTO-ENTMCNC: 32.9 G/DL (ref 31.5–35.7)
MCV RBC AUTO: 89 FL (ref 79–97)
MICRO URNS: NORMAL
MONOCYTES # BLD AUTO: 0.4 X10E3/UL (ref 0.1–0.9)
MONOCYTES NFR BLD AUTO: 5 %
NEUTROPHILS # BLD AUTO: 5.7 X10E3/UL (ref 1.4–7)
NEUTROPHILS NFR BLD AUTO: 63 %
NITRITE UR QL STRIP: NEGATIVE
PH UR STRIP: 6 [PH] (ref 5–7.5)
PLATELET # BLD AUTO: 377 X10E3/UL (ref 150–379)
POTASSIUM SERPL-SCNC: 4.2 MMOL/L (ref 3.5–5.2)
PROT SERPL-MCNC: 7 G/DL (ref 6–8.5)
PROT UR QL STRIP: NEGATIVE
RBC # BLD AUTO: 4.37 X10E6/UL (ref 3.77–5.28)
SODIUM SERPL-SCNC: 139 MMOL/L (ref 134–144)
SP GR UR: 1.01 (ref 1–1.03)
TRIGL SERPL-MCNC: 112 MG/DL (ref 0–149)
TSH SERPL DL<=0.005 MIU/L-ACNC: 5.01 UIU/ML (ref 0.45–4.5)
UROBILINOGEN UR STRIP-MCNC: 0.2 MG/DL (ref 0.2–1)
VIT B12 SERPL-MCNC: 821 PG/ML (ref 232–1245)
VLDLC SERPL CALC-MCNC: 22 MG/DL (ref 5–40)
WBC # BLD AUTO: 8.9 X10E3/UL (ref 3.4–10.8)

## 2018-01-15 NOTE — PERIOP NOTES
[facility]  Ambulatory Surgery Unit  Pre-operative Instructions for Endo Procedures    Procedure Date  1/18/18            Tentative Arrival Time 0730      1. On the day of your procedure, please report to the Ambulatory Surgery Unit Registration Desk and sign in at your designated time. The Ambulatory Surgery Unit is located in [facility] on the [facility] side of the [facility] across from the [address] [place] [address]. Please have all of your health insurance cards and a photo ID. 2. You must have someone with you to drive you home, as you should not drive a car for 24 hours following anesthesia. Please make arrangements for a responsible adult friend or family member to stay with you for at least the first 24 hours after your procedure. 3. Do not have anything to eat or drink (including water, gum, mints, coffee, juice) after midnight   1/17/18. This may not apply to medications prescribed by your physician. (Please note below the special instructions with medications to take the morning of your procedure.)    4. If applicable, follow the clear liquid diet and bowel prep instructions provided by your physician's office. If you do not have this information, or have any questions, please contact your physician's office. 5. We recommend you do not drink any alcoholic beverages for 24 hours before and after your procedure. 6. Contact your surgeons office for instructions on the following medications: non-steroidal anti-inflammatory drugs (i.e. Advil, Aleve), vitamins, and supplements. (Some surgeons will want you to stop these medications prior to surgery and others may allow you to take them)   **If you are currently taking Plavix, Coumadin, Aspirin and/or other blood-thinning agents, contact your surgeon for instructions. ** Your surgeon will partner with the physician prescribing these medications to determine if it is safe to stop or if you need to continue taking.  Please do not stop taking these medications without instructions from your surgeon. 7. In an effort to help prevent surgical site infection, we ask that you shower with an anti-bacterial soap (i.e. Dial or Safeguard) on the morning of your procedure. Do not apply any lotions, powders, or deodorants after showering. 8. Wear comfortable clothes. Wear glasses instead of contacts. Do not bring any jewelry or money (other than copays or fees as instructed). Do not wear make-up, particularly mascara, the morning of your procedure. Wear your hair loose or down, no ponytails, buns, evonne pins or clips. All body piercings must be removed. 9. You should understand that if you do not follow these instructions your procedure may be cancelled. If your physical condition changes (i.e. fever, cold or flu) please contact your surgeon as soon as possible. 10. It is important that you be on time. If a situation occurs where you may be late, or if you have any questions or problems, please call [phone]. 11. Your procedure time may be subject to change. You will receive a phone call the day prior to confirm your arrival time. Special Instructions: Take all medications and inhalers, as prescribed, on the morning of surgery with a sip of water EXCEPT: none      I understand a pre-operative phone call will be made to verify my procedure time. In the event that I am not available, I give permission for a message to be left on my answering service and/or with another person?       Yes     (instructions given verbally during phone assessment- pt voiced understanding)     ___________________      ___________________      ___________________  (Signature of Patient)          (Witness)                   (Date and Time)

## 2018-01-16 ENCOUNTER — HOSPITAL ENCOUNTER (OUTPATIENT)
Dept: LAB | Age: 28
Discharge: HOME | End: 2018-01-16

## 2018-01-16 ENCOUNTER — HOSPITAL ENCOUNTER (EMERGENCY)
Age: 28
Discharge: HOME | End: 2018-01-16
Attending: FAMILY MEDICINE

## 2018-01-16 VITALS
SYSTOLIC BLOOD PRESSURE: 132 MMHG | RESPIRATION RATE: 16 BRPM | HEART RATE: 85 BPM | DIASTOLIC BLOOD PRESSURE: 91 MMHG | BODY MASS INDEX: 45.08 KG/M2 | WEIGHT: 245 LBS | HEIGHT: 62 IN | OXYGEN SATURATION: 99 % | TEMPERATURE: 97.6 F

## 2018-01-16 DIAGNOSIS — H66.001 ACUTE SUPPURATIVE OTITIS MEDIA OF RIGHT EAR WITHOUT SPONTANEOUS RUPTURE OF TYMPANIC MEMBRANE, RECURRENCE NOT SPECIFIED: Primary | ICD-10-CM

## 2018-01-16 DIAGNOSIS — M79.10 MYALGIA: ICD-10-CM

## 2018-01-16 LAB
ANION GAP BLD CALC-SCNC: 16 MMOL/L (ref 5–15)
BILIRUB UR QL: NEGATIVE
BUN BLD-MCNC: 9 MG/DL (ref 9–20)
CA-I BLD-MCNC: 1.18 MMOL/L (ref 1.12–1.32)
CHLORIDE BLD-SCNC: 102 MMOL/L (ref 98–107)
CO2 BLD-SCNC: 28 MMOL/L (ref 21–32)
CREAT BLD-MCNC: 0.7 MG/DL (ref 0.6–1.3)
GLUCOSE BLD-MCNC: 71 MG/DL (ref 65–100)
GLUCOSE UR QL STRIP.AUTO: NEGATIVE MG/DL
HCG UR QL: NEGATIVE
HCT VFR BLD CALC: 40 % (ref 35–47)
HGB BLD-MCNC: 13.6 GM/DL (ref 11.5–16)
INFLUENZA A AG (POC): NEGATIVE
INFLUENZA AG (POC) NEGATIVE CTRL.: NORMAL
INFLUENZA AG (POC) POSITIVE CTRL.: NORMAL
INFLUENZA B AG (POC): NEGATIVE
KETONES UR-MCNC: NEGATIVE MG/DL
LEUKOCYTE ESTERASE UR QL STRIP: ABNORMAL
LOT NUMBER POC: NORMAL
NITRITE UR QL: NEGATIVE
PH UR: 6 [PH] (ref 5–8)
POTASSIUM BLD-SCNC: 4 MMOL/L (ref 3.5–5.1)
PROT UR QL: NEGATIVE MG/DL
RBC # UR STRIP: ABNORMAL /UL
SERVICE CMNT-IMP: ABNORMAL
SODIUM BLD-SCNC: 141 MMOL/L (ref 136–145)
SP GR UR: <=1.005 (ref 1–1.03)
UROBILINOGEN UR QL: 0.2 EU/DL (ref 0.2–1)

## 2018-01-16 PROCEDURE — 87086 URINE CULTURE/COLONY COUNT: CPT | Performed by: FAMILY MEDICINE

## 2018-01-16 RX ORDER — AMOXICILLIN 875 MG/1
875 TABLET, FILM COATED ORAL EVERY 12 HOURS
Qty: 20 TAB | Refills: 0 | Status: SHIPPED | OUTPATIENT
Start: 2018-01-16 | End: 2018-01-26

## 2018-01-16 NOTE — DISCHARGE INSTRUCTIONS
Muscle Aches: Care Instructions  Your Care Instructions  Muscle aches have many possible causes. Some common ones are overuse, tension, and injuries such as a strained muscle. An infection such as the flu can cause muscle aches. Or the aches may be caused by some medicines, such as statins. Muscle aches may also be a symptom of a disease like lupus or fibromyalgia. Myalgia is the medical term for muscle aches. The doctor will do a physical exam and ask questions to try to find what is causing your pain. You may also have tests such as blood tests or imaging tests like X-rays. These can help find or rule out serious problems. The doctor has checked you carefully, but problems can develop later. If you notice any problems or new symptoms, get medical treatment right away. Follow-up care is a key part of your treatment and safety. Be sure to make and go to all appointments, and call your doctor if you are having problems. It's also a good idea to know your test results and keep a list of the medicines you take. How can you care for yourself at home? · Rest the area that hurts. You may need to stop or reduce the activity that causes your symptoms. Then you can return to it slowly. · Put ice or a cold pack on the area for 10 to 20 minutes at a time to ease pain. Put a thin cloth between the ice and your skin. · Take an over-the-counter pain medicine, such as acetaminophen (Tylenol), ibuprofen (Advil, Motrin), or naproxen (Aleve). Be safe with medicines. Read and follow all instructions on the label. When should you call for help? Call your doctor now or seek immediate medical care if:  · Your pain gets worse. · You have new symptoms, such as a fever, swelling, or a rash. Watch closely for changes in your health, and be sure to contact your doctor if:  · You do not get better as expected. Where can you learn more?    Go to LAVEGO.be  Enter G355 in the search box to learn more about \"Muscle Aches: Care Instructions. \"   © 0067-7889 Healthwise, [employer]. Care instructions adapted under license by [employer] (which disclaims liability or warranty for this information). This care instruction is for use with your licensed healthcare professional. If you have questions about a medical condition or this instruction, always ask your healthcare professional. [address] any warranty or liability for your use of this information. Content Version: 84.1.172341; Current as of: November 20, 2015             Ear Infection (Otitis Media): Care Instructions  Your Care Instructions    An ear infection may start with a cold and affect the middle ear (otitis media). It can hurt a lot. Most ear infections clear up on their own in a couple of days. Most often you will not need antibiotics. This is because many ear infections are caused by a virus. Antibiotics don't work against a virus. Regular doses of pain medicines are the best way to reduce your fever and help you feel better. Follow-up care is a key part of your treatment and safety. Be sure to make and go to all appointments, and call your doctor if you are having problems. It's also a good idea to know your test results and keep a list of the medicines you take. How can you care for yourself at home? · Take pain medicines exactly as directed. ¨ If the doctor gave you a prescription medicine for pain, take it as prescribed. ¨ If you are not taking a prescription pain medicine, take an over-the-counter medicine, such as acetaminophen (Tylenol), ibuprofen (Advil, Motrin), or naproxen (Aleve). Read and follow all instructions on the label. ¨ Do not take two or more pain medicines at the same time unless the doctor told you to. Many pain medicines have acetaminophen, which is Tylenol. Too much acetaminophen (Tylenol) can be harmful. · Plan to take a full dose of pain reliever before bedtime.  Getting enough sleep will help you get better. · Try a warm, moist washcloth on the ear. It may help relieve pain. · If your doctor prescribed antibiotics, take them as directed. Do not stop taking them just because you feel better. You need to take the full course of antibiotics. When should you call for help? Call your doctor now or seek immediate medical care if:  ? · You have new or increasing ear pain. ? · You have new or increasing pus or blood draining from your ear. ? · You have a fever with a stiff neck or a severe headache. ? Watch closely for changes in your health, and be sure to contact your doctor if:  ? · You have new or worse symptoms. ? · You are not getting better after taking an antibiotic for 2 days. Where can you learn more? Go to http://jamin-neisha.info/. Enter R544 in the search box to learn more about \"Ear Infection (Otitis Media): Care Instructions. \"  Current as of: May 12, 2017  Content Version: 11.4  © 7055-3800 [employer]. Care instructions adapted under license by [employer] (which disclaims liability or warranty for this information). If you have questions about a medical condition or this instruction, always ask your healthcare professional. [employer]on[employer] Prim any warranty or liability for your use of this information.

## 2018-01-16 NOTE — UC PROVIDER NOTE
Patient is a 32 y.o. female presenting with general illness. The history is provided by the patient. Generalized Body Aches   This is a chronic problem. Episode onset: 2-3 months ago; worse in the past 2 days. The problem occurs constantly. The problem has been gradually worsening. Pertinent negatives include no chest pain, no abdominal pain, no headaches and no shortness of breath. Associated symptoms comments: Bilateral ear congestion for the past 2 days. She has tried nothing for the symptoms. Past Medical History:   Diagnosis Date    Asthma     exercise / cold induced doesn't use inhaler    Interstitial cystitis         Past Surgical History:   Procedure Laterality Date    HX CHOLECYSTECTOMY           Family History   Problem Relation Age of Onset    Hypertension Mother     Diabetes Mother     Diabetes Father     Hypertension Father     No Known Problems Sister     Cancer Brother         Social History     Social History    Marital status: SINGLE     Spouse name: N/A    Number of children: N/A    Years of education: N/A     Occupational History    Not on file. Social History Main Topics    Smoking status: Never Smoker    Smokeless tobacco: Never Used    Alcohol use No    Drug use: No    Sexual activity: Yes     Partners: Male     Birth control/ protection: IUD     Other Topics Concern    Not on file     Social History Narrative                ALLERGIES: Shellfish derived and Banana    Review of Systems   Constitutional: Negative for chills and fever. Respiratory: Negative for shortness of breath and wheezing. Cardiovascular: Negative for chest pain. Gastrointestinal: Negative for abdominal pain, nausea and vomiting. Musculoskeletal: Positive for myalgias. Neurological: Negative for headaches.        Vitals:    01/16/18 1032   BP: (!) 132/91   Pulse: 85   Resp: 16   Temp: 97.6 °F (36.4 °C)   SpO2: 99%   Weight: 111.1 kg (245 lb)   Height: 5' 2\" (1.575 m)       Physical Exam   Constitutional: She appears well-developed and well-nourished. No distress. HENT:   Right Ear: External ear and ear canal normal. Tympanic membrane is erythematous and bulging. A middle ear effusion is present. Left Ear: External ear and ear canal normal. Tympanic membrane is not erythematous and not bulging. A middle ear effusion is present. Nose: Rhinorrhea present. Right sinus exhibits no maxillary sinus tenderness and no frontal sinus tenderness. Left sinus exhibits no maxillary sinus tenderness and no frontal sinus tenderness. Mouth/Throat: Oropharynx is clear and moist and mucous membranes are normal. No oropharyngeal exudate, posterior oropharyngeal edema, posterior oropharyngeal erythema or tonsillar abscesses. Cardiovascular: Normal rate, regular rhythm and normal heart sounds. Pulmonary/Chest: Effort normal and breath sounds normal. No respiratory distress. She has no wheezes. She has no rales. Lymphadenopathy:     She has cervical adenopathy. Neurological: She is alert. Skin: She is not diaphoretic. Psychiatric: She has a normal mood and affect. Her behavior is normal. Judgment and thought content normal.   Nursing note and vitals reviewed. MDM     Differential Diagnosis; Clinical Impression; Plan:     CLINICAL IMPRESSION:  Acute suppurative otitis media of right ear without spontaneous rupture of tympanic membrane, recurrence not specified  (primary encounter diagnosis)  Myalgia    Plan:  1. Amoxicillin, Fluids  2. Recheck Urine pregnancy in 2 weeks if no menstrual peroid  3. F/u with pcp - may need to be evaluated for autoimmune dz, including dermatomyositis  Amount and/or Complexity of Data Reviewed:   Clinical lab tests:  Reviewed and ordered  Risk of Significant Complications, Morbidity, and/or Mortality:   Presenting problems: Moderate  Diagnostic procedures: Moderate  Management options:   Moderate  Progress:   Patient progress:  Stable      Procedures

## 2018-01-16 NOTE — LETTER
[facility] 
[address] [facility] [address] 
[phone] Work/School Note Date: 1/16/2018 To Whom It May concern: Isidro Aldridge was seen and treated today in the urgent care center by the following provider(s): 
Attending Provider: Shaneka Pham MD.   
 
Isidro Aldridge may return to work on 1/18/2018. Sincerely, Shaneka Pham MD

## 2018-01-17 ENCOUNTER — ANESTHESIA EVENT (OUTPATIENT)
Dept: SURGERY | Age: 28
End: 2018-01-17
Payer: COMMERCIAL

## 2018-01-18 ENCOUNTER — ANESTHESIA (OUTPATIENT)
Dept: SURGERY | Age: 28
End: 2018-01-18
Payer: COMMERCIAL

## 2018-01-18 ENCOUNTER — HOSPITAL ENCOUNTER (OUTPATIENT)
Age: 28
Setting detail: OUTPATIENT SURGERY
Discharge: HOME | End: 2018-01-18
Attending: SPECIALIST | Admitting: SPECIALIST
Payer: COMMERCIAL

## 2018-01-18 VITALS
HEART RATE: 73 BPM | BODY MASS INDEX: 44.44 KG/M2 | WEIGHT: 241.5 LBS | DIASTOLIC BLOOD PRESSURE: 82 MMHG | OXYGEN SATURATION: 100 % | HEIGHT: 62 IN | TEMPERATURE: 98.6 F | SYSTOLIC BLOOD PRESSURE: 130 MMHG | RESPIRATION RATE: 13 BRPM

## 2018-01-18 DIAGNOSIS — R79.89 ELEVATED TSH: Primary | ICD-10-CM

## 2018-01-18 LAB
BACTERIA SPEC CULT: NORMAL
CC UR VC: NORMAL
HCG UR QL: NEGATIVE
SERVICE CMNT-IMP: NORMAL

## 2018-01-18 PROCEDURE — 76210000040 HC AMBSU PH I REC FIRST 0.5 HR: Performed by: SPECIALIST

## 2018-01-18 PROCEDURE — 74011000250 HC RX REV CODE- 250

## 2018-01-18 PROCEDURE — 74011250636 HC RX REV CODE- 250/636: Performed by: ANESTHESIOLOGY

## 2018-01-18 PROCEDURE — 81025 URINE PREGNANCY TEST: CPT

## 2018-01-18 PROCEDURE — 77030009426 HC FCPS BIOP ENDOSC BSC -B: Performed by: SPECIALIST

## 2018-01-18 PROCEDURE — 88342 IMHCHEM/IMCYTCHM 1ST ANTB: CPT | Performed by: SPECIALIST

## 2018-01-18 PROCEDURE — 76060000061 HC AMB SURG ANES 0.5 TO 1 HR: Performed by: SPECIALIST

## 2018-01-18 PROCEDURE — 74011250636 HC RX REV CODE- 250/636

## 2018-01-18 PROCEDURE — 76210000046 HC AMBSU PH II REC FIRST 0.5 HR: Performed by: SPECIALIST

## 2018-01-18 PROCEDURE — 88305 TISSUE EXAM BY PATHOLOGIST: CPT | Performed by: SPECIALIST

## 2018-01-18 PROCEDURE — 76030000000 HC AMB SURG OR TIME 0.5 TO 1: Performed by: SPECIALIST

## 2018-01-18 RX ORDER — ONDANSETRON 2 MG/ML
INJECTION INTRAMUSCULAR; INTRAVENOUS AS NEEDED
Status: DISCONTINUED | OUTPATIENT
Start: 2018-01-18 | End: 2018-01-18 | Stop reason: HOSPADM

## 2018-01-18 RX ORDER — SODIUM CHLORIDE, SODIUM LACTATE, POTASSIUM CHLORIDE, CALCIUM CHLORIDE 600; 310; 30; 20 MG/100ML; MG/100ML; MG/100ML; MG/100ML
25 INJECTION, SOLUTION INTRAVENOUS CONTINUOUS
Status: DISCONTINUED | OUTPATIENT
Start: 2018-01-18 | End: 2018-01-18 | Stop reason: HOSPADM

## 2018-01-18 RX ORDER — SODIUM CHLORIDE 0.9 % (FLUSH) 0.9 %
5-10 SYRINGE (ML) INJECTION EVERY 8 HOURS
Status: DISCONTINUED | OUTPATIENT
Start: 2018-01-18 | End: 2018-01-18 | Stop reason: HOSPADM

## 2018-01-18 RX ORDER — SODIUM CHLORIDE 9 MG/ML
50 INJECTION, SOLUTION INTRAVENOUS CONTINUOUS
Status: DISCONTINUED | OUTPATIENT
Start: 2018-01-18 | End: 2018-01-18 | Stop reason: HOSPADM

## 2018-01-18 RX ORDER — LIDOCAINE HYDROCHLORIDE 20 MG/ML
INJECTION, SOLUTION EPIDURAL; INFILTRATION; INTRACAUDAL; PERINEURAL AS NEEDED
Status: DISCONTINUED | OUTPATIENT
Start: 2018-01-18 | End: 2018-01-18 | Stop reason: HOSPADM

## 2018-01-18 RX ORDER — ONDANSETRON 2 MG/ML
4 INJECTION INTRAMUSCULAR; INTRAVENOUS AS NEEDED
Status: DISCONTINUED | OUTPATIENT
Start: 2018-01-18 | End: 2018-01-18 | Stop reason: HOSPADM

## 2018-01-18 RX ORDER — FENTANYL CITRATE 50 UG/ML
25 INJECTION, SOLUTION INTRAMUSCULAR; INTRAVENOUS
Status: DISCONTINUED | OUTPATIENT
Start: 2018-01-18 | End: 2018-01-18 | Stop reason: HOSPADM

## 2018-01-18 RX ORDER — DEXTROMETHORPHAN/PSEUDOEPHED 2.5-7.5/.8
1.2 DROPS ORAL
Status: DISCONTINUED | OUTPATIENT
Start: 2018-01-18 | End: 2018-01-18 | Stop reason: HOSPADM

## 2018-01-18 RX ORDER — SODIUM CHLORIDE 0.9 % (FLUSH) 0.9 %
5-10 SYRINGE (ML) INJECTION AS NEEDED
Status: DISCONTINUED | OUTPATIENT
Start: 2018-01-18 | End: 2018-01-18 | Stop reason: HOSPADM

## 2018-01-18 RX ORDER — DIPHENHYDRAMINE HYDROCHLORIDE 50 MG/ML
12.5 INJECTION, SOLUTION INTRAMUSCULAR; INTRAVENOUS AS NEEDED
Status: DISCONTINUED | OUTPATIENT
Start: 2018-01-18 | End: 2018-01-18 | Stop reason: HOSPADM

## 2018-01-18 RX ORDER — LIDOCAINE HYDROCHLORIDE 10 MG/ML
0.1 INJECTION, SOLUTION EPIDURAL; INFILTRATION; INTRACAUDAL; PERINEURAL AS NEEDED
Status: DISCONTINUED | OUTPATIENT
Start: 2018-01-18 | End: 2018-01-18 | Stop reason: HOSPADM

## 2018-01-18 RX ADMIN — LIDOCAINE HYDROCHLORIDE 50 MG: 20 INJECTION, SOLUTION EPIDURAL; INFILTRATION; INTRACAUDAL; PERINEURAL at 08:58

## 2018-01-18 RX ADMIN — ONDANSETRON 4 MG: 2 INJECTION INTRAMUSCULAR; INTRAVENOUS at 08:34

## 2018-01-18 RX ADMIN — LIDOCAINE HYDROCHLORIDE 50 MG: 20 INJECTION, SOLUTION EPIDURAL; INFILTRATION; INTRACAUDAL; PERINEURAL at 08:45

## 2018-01-18 RX ADMIN — LIDOCAINE HYDROCHLORIDE 50 MG: 20 INJECTION, SOLUTION EPIDURAL; INFILTRATION; INTRACAUDAL; PERINEURAL at 08:52

## 2018-01-18 RX ADMIN — LIDOCAINE HYDROCHLORIDE 50 MG: 20 INJECTION, SOLUTION EPIDURAL; INFILTRATION; INTRACAUDAL; PERINEURAL at 08:30

## 2018-01-18 RX ADMIN — SODIUM CHLORIDE, SODIUM LACTATE, POTASSIUM CHLORIDE, AND CALCIUM CHLORIDE 25 ML/HR: 600; 310; 30; 20 INJECTION, SOLUTION INTRAVENOUS at 08:22

## 2018-01-18 RX ADMIN — LIDOCAINE HYDROCHLORIDE 50 MG: 20 INJECTION, SOLUTION EPIDURAL; INFILTRATION; INTRACAUDAL; PERINEURAL at 08:31

## 2018-01-18 RX ADMIN — LIDOCAINE HYDROCHLORIDE 50 MG: 20 INJECTION, SOLUTION EPIDURAL; INFILTRATION; INTRACAUDAL; PERINEURAL at 08:37

## 2018-01-18 RX ADMIN — LIDOCAINE HYDROCHLORIDE 50 MG: 20 INJECTION, SOLUTION EPIDURAL; INFILTRATION; INTRACAUDAL; PERINEURAL at 08:40

## 2018-01-18 RX ADMIN — LIDOCAINE HYDROCHLORIDE 50 MG: 20 INJECTION, SOLUTION EPIDURAL; INFILTRATION; INTRACAUDAL; PERINEURAL at 08:43

## 2018-01-18 RX ADMIN — LIDOCAINE HYDROCHLORIDE 50 MG: 20 INJECTION, SOLUTION EPIDURAL; INFILTRATION; INTRACAUDAL; PERINEURAL at 08:32

## 2018-01-18 RX ADMIN — LIDOCAINE HYDROCHLORIDE 50 MG: 20 INJECTION, SOLUTION EPIDURAL; INFILTRATION; INTRACAUDAL; PERINEURAL at 08:49

## 2018-01-18 RX ADMIN — LIDOCAINE HYDROCHLORIDE 50 MG: 20 INJECTION, SOLUTION EPIDURAL; INFILTRATION; INTRACAUDAL; PERINEURAL at 08:35

## 2018-01-18 RX ADMIN — LIDOCAINE HYDROCHLORIDE 50 MG: 20 INJECTION, SOLUTION EPIDURAL; INFILTRATION; INTRACAUDAL; PERINEURAL at 08:55

## 2018-01-18 NOTE — ANESTHESIA PREPROCEDURE EVALUATION
Anesthetic History   No history of anesthetic complications            Review of Systems / Medical History  Patient summary reviewed, nursing notes reviewed and pertinent labs reviewed    Pulmonary            Asthma (cold & exercise-induced)        Neuro/Psych   Within defined limits           Cardiovascular  Within defined limits                Exercise tolerance: >4 METS     GI/Hepatic/Renal     GERD: poorly controlled          Comments: Abdominal pain  nausea Endo/Other        Morbid obesity     Other Findings   Comments: Interstitial Cystitis         Physical Exam    Airway  Mallampati: I  TM Distance: > 6 cm  Neck ROM: normal range of motion   Mouth opening: Normal     Cardiovascular    Rhythm: regular  Rate: normal      Pertinent negatives: No murmur   Dental  No notable dental hx       Pulmonary  Breath sounds clear to auscultation               Abdominal  GI exam deferred       Other Findings            Anesthetic Plan    ASA: 2  Anesthesia type: general and total IV anesthesia          Induction: Intravenous  Anesthetic plan and risks discussed with: Patient

## 2018-01-18 NOTE — PROCEDURES
Colonoscopy Procedure Note    Indications:   Diarrhea    Referring Physician: Lurdes Cherry MD  Anesthesia/Sedation: MAC anesthesia Propofol  Endoscopist:  Dr. Waqas Gomez    Procedure in Detail:  Informed consent was obtained for the procedure, including sedation. Risks of perforation, hemorrhage, adverse drug reaction, and aspiration were discussed. The patient was placed in the left lateral decubitus position. Based on the pre-procedure assessment, including review of the patient's medical history, medications, allergies, and review of systems, she had been deemed to be an appropriate candidate for moderate sedation; she was therefore sedated with the medications listed above. The patient was monitored continuously with ECG tracing, pulse oximetry, blood pressure monitoring, and direct observations. A rectal examination was performed. The [id number] was inserted into the rectum and advanced under direct vision to the terminal ileum. The quality of the colonic preparation was adequate. A careful inspection was made as the colonoscope was withdrawn, including a retroflexed view of the rectum; findings and interventions are described below. Appropriate photodocumentation was obtained. Findings:     1. Scope advanced to the cecum and terminal ileum intubated and examined for approximately 20 cm. 2.  Normal mucosa visualized throughout the colon and TI. S/P Random biopsies of the terminal ileum and colon to r/o colitis or IBD. 3.  No polyps seen. Preparation was adequate. Therapies:  See above    Specimen: Specimens were collected as described above and sent to pathology. Complications: None were encountered during the procedure. EBL: < 10 ml.     Recommendations:   -f/u path with Dr. Lety Oakley  -colonoscopy for screening purposes should be repeated at age 46  Signed By: George Cardona MD                        January 18, 2018

## 2018-01-18 NOTE — PERIOP NOTES
5562 pt arrived via stretcher from OR procedure awake and comfortable  0915 brought caregiver back  5552 Dr. Galeas Members back to see pt  0935 Pt. Alert. Denies pain or chill. Discharge instructions reviewed with caregiver and patient. Allowed and answered any and all questions. Tolerating PO fluids. Both state ready for discharge. Assisted pt with getting dressed.  Confirmed all belongings with patient

## 2018-01-18 NOTE — PERIOP NOTES
Layman Lose  1990  [id number]    Situation:  Verbal report given from: Baldomero Spencer RN, Claudene Hick, CRNA  Procedure: Procedure(s):  COLONOSCOPY  ESOPHAGOGASTRODUODENOSCOPY (EGD)  ESOPHAGOGASTRODUODENAL (EGD) BIOPSY  COLON BIOPSY    Background:    Preoperative diagnosis: DIARRHEA, GERD    Postoperative diagnosis: Hiatal hernia  Normal Colon exam    :  Dr. Tamanna Castelan    Assistant(s): Circ-1: Michael Blank RN  Scrub RN-1: Medhat Brown RN    Specimens:   ID Type Source Tests Collected by Time Destination   1 : duodenum biopsy Preservative Duodenum  Sonali Segovia MD 1/18/2018 7367 Pathology   2 : stomach biopsy Preservative Stomach  Sonali Segovia MD 1/18/2018 5464 Pathology   3 : ileum biopsy Preservative Ileum  Sonali Segovia MD 1/18/2018 4657 Pathology   4 : random colon biopsies Preservative Colon  Sonali Segovia MD 1/18/2018 0900 Pathology       Assessment:  Intra-procedure medications         Anesthesia gave intra-procedure sedation and medications, see anesthesia flow sheet     Intravenous fluids: LR@ KVO     Vital signs stable       Recommendation:    Permission to share finding with family or friend yes

## 2018-01-18 NOTE — IP AVS SNAPSHOT
[address] [facility] 
[phone] Patient: Kassidy Webster MRN: [id number] MEN:[id number] About your hospitalization You were admitted on:  January 18, 2018 You last received care in the:  [facility] PACU You were discharged on:  January 18, 2018 Why you were hospitalized Your primary diagnosis was:  Not on File Follow-up Information None Your Scheduled Appointments Monday January 22, 2018  7:45 AM EST  
ROUTINE CARE with Arti Sanon MD  
[facility] [address] [facility] [address] [facility] [address] [facility]  
[phone] Discharge Orders None A check julio indicates which time of day the medication should be taken. My Medications CONTINUE taking these medications Instructions Each Dose to Equal  
 Morning Noon Evening Bedtime  
 albuterol 90 mcg/actuation inhaler Commonly known as:  PROVENTIL HFA, VENTOLIN HFA, PROAIR HFA Your last dose was: Your next dose is: Take 2 Puffs by inhalation every four (4) hours as needed for Wheezing. 2 Puff  
    
   
   
   
  
 amoxicillin 875 mg tablet Commonly known as:  AMOXIL Your last dose was: Your next dose is: Take 1 Tab by mouth every twelve (12) hours for 10 days. 875 mg  
    
   
   
   
  
 cetirizine 10 mg tablet Commonly known as:  ZYRTEC Your last dose was: Your next dose is: Take 1 Tab by mouth daily. 10 mg Cholecalciferol (Vitamin D3) 2,000 unit Cap capsule Commonly known as:  VITAMIN D3 Your last dose was: Your next dose is: Take 2,000 Units by mouth daily. Indications: VITAMIN D DEFICIENCY (HIGH DOSE THERAPY) 2000 Units  
    
   
   
   
  
 hydroCHLOROthiazide 25 mg tablet Commonly known as:  HYDRODIURIL  
   
 Your last dose was: Your next dose is: Take 1 Tab by mouth daily. Indications: Edema, hypertension 25 mg  
    
   
   
   
  
 OTHER Your last dose was: Your next dose is: Take 2 Tabs by mouth daily. Pill form of cholestramine 2 Tab Peak Flow Meter [employer] Your last dose was: Your next dose is:    
   
   
 1 Each by Does Not Apply route two (2) times daily as needed. 1 Each QUESTRAN 4 gram powder Generic drug:  cholestyramine-sucrose Your last dose was: Your next dose is: Take  by mouth daily. Discharge Instructions Kassidy Webster 
[id number] 
1990 COLON / EGD DISCHARGE INSTRUCTIONS Discomfort: 
Sore throat- throat lozenges or warm salt water gargle Redness at IV site- apply warm compress to area; if redness or soreness persist- contact your physician There may be a slight amount of blood passed from the rectum Gaseous discomfort- walking, belching will help relieve any discomfort You may not operate a vehicle for 12 hours You may not engage in an occupation involving machinery or appliances for rest of today You may not drink alcoholic beverages for at least 12 hours Avoid making any critical decisions for at least 24 hour DIET: 
 Regular diet.  however -  remember your colon is empty and a heavy meal will produce gas. Avoid these foods:  vegetables, fried / greasy foods, carbonated drinks for today ACTIVITY: 
You may  resume your normal daily activities it is recommended that you spend the remainder of the day resting -  avoid any strenuous activity. CALL M.D. ANY SIGN OF: Increasing pain, nausea, vomiting Abdominal distension (swelling) New increased bleeding (oral or rectal) Fever (chills) Pain in chest area Bloody discharge from nose or mouth Shortness of breath Tylenol as needed for pain. Follow-up Instructions: 
 Call Dr. Trupti Pedersen for results of procedure / biopsy in 7 days at telephone #  [phone] Additional instructions: Make a follow up visit with Dr. Mary Qureshi to discuss results. Caitlyn Husbands 
[id number] 
1990 DISCHARGE SUMMARY from Nurse The following personal items collected during your admission are returned to you:  
Dental Appliance: Dental Appliances: None Vision: Visual Aid: Glasses Hearing Aid:   
Jewelry:   
Clothing:   
Other Valuables:   
Valuables sent to safe:   
 
PATIENT INSTRUCTIONS: 
 
Take Home Medications: 
 
DO NOT TAKE TYLENOL/ACETAMINOPHEN WITH PERCOCET, [address], [address] TAKE NARCOTIC PAIN MEDICATIONS WITH FOOD If given 2 pain narcotics do NOT take together! Narcotics tend to be constipating, we suggest taking a stool softener such as Colace or Miralax (follow package instructions). DO NOT DRIVE WHILE TAKING NARCOTIC PAIN MEDICATIONS. DO NOT TAKE SLEEPING MEDICATIONS OR ANTIANXIETY MEDICATIONS WHILE TAKING NARCOTIC PAIN MEDICATIONS,  ESPECIALLY THE NIGHT OF ANESTHESIA. CPAP PATIENTS BE SURE TO WEAR MACHINE WHENEVER NAPPING OR SLEEPING. DISCHARGE SUMMARY from Nurse The following personal items collected during your admission are returned to you:  
Dental Appliance: Dental Appliances: None Vision: Visual Aid: Glasses Hearing Aid:   
Jewelry:   
Clothing:   
Other Valuables:   
Valuables sent to safe:   
 
 
PATIENT INSTRUCTIONS: 
 
 
B - Balance E - Eyes F-  Face looks uneven A-  Arms unable to move or move even S-  Speech slurred or non-existent T-  Time-call 911 as soon as signs and symptoms begin-DO NOT go Back to bed or wait to see if you get better-TIME IS BRAIN. If you have not received your influenza and/or pneumococcal vaccine, please follow up with your primary care physician. The discharge information has been reviewed with the {PATIENT PARENT GUARDIAN:[id number]}. The {PATIENT PARENT GUARDIAN:35432} verbalized understanding. Introducing [facility]! Dear Kimberley Anaya: 
Thank you for requesting a Quizens account. Our records indicate that you already have an active Quizens account. You can access your account anytime at [email or web address] Did you know that you can access your hospital and ER discharge instructions at any time in Quizens? You can also review all of your test results from your hospital stay or ER visit. Additional Information If you have questions, please visit the Frequently Asked Questions section of the Blockboard[employer] website at [email or web address] Remember, MyChart is NOT to be used for urgent needs. For medical emergencies, dial 911. Now available from your iPhone and Android! Providers Seen During Your Hospitalization Provider Specialty Primary office phone Aliya Nagel MD Gastroenterology [phone] Your Primary Care Physician (PCP) Primary Care Physician Office Phone Office Fax [address] [phone] You are allergic to the following Allergen Reactions Shellfish Derived Swelling Wheezing Banana Other (comments) Abdominal pain Recent Documentation Height Weight Breastfeeding? BMI OB Status Smoking Status 1.575 m 109.5 kg No 44.17 kg/m2 Having regular periods Never Smoker Emergency Contacts Name Discharge Info Relation Home Work Mobile Shireen Villagomezmario CAREGIVER [3] Parent [1] [phone] Samy Burgess DISCHARGE CAREGIVER [3] Boyfriend [17]   [phone] Patient Belongings The following personal items are in your possession at time of discharge: 
  Dental Appliances: None  Visual Aid: Glasses Please provide this summary of care documentation to your next provider. Signatures-by signing, you are acknowledging that this After Visit Summary has been reviewed with you and you have received a copy. Patient Signature:  ____________________________________________________________ Date:  ____________________________________________________________  
  
Janice Caldwell Provider Signature:  ____________________________________________________________ Date:  ____________________________________________________________

## 2018-01-18 NOTE — PROCEDURES
Esophagogastroduodenoscopy Procedure Note      Yokasta Route  1990  [id number]    Indication:  Chronic diarrhea, r/o celiac     Endoscopist: Madison Hurtado MD    Referring Provider:  Rolando Colby MD    Sedation:  MAC anesthesia Propofol    Procedure Details:  After infomed consent was obtained for the procedure, with all risks and benefits of procedure explained the patient was taken to the endoscopy suite and placed in the left lateral decubitus position. Following sequential administration of sedation as per above, the endoscope was inserted into the mouth and advanced under direct vision to second portion of the duodenum. A careful inspection was made as the gastroscope was withdrawn, including a retroflexed view of the proximal stomach; findings and interventions are described below. Findings:     Esophagus:   + Normal mucosa throughout the esophagus. + Z line normal at 38 cm from incisors.  + Small 2 cm hiatal hernia. Stomach:   + Mild erythema in the stomach seen with some bile pooled as well r/o bile gastritis s/p Bx.  - Normal proximal stomach mucosa seen on retroflexion. Duodenum:   - The bulb and post bulbar mucosa is normal in appearance to the second portion. The duodenal folds appeared normal.  Cold forceps biopsies to r/o celiac were obtained. Therapies:  See above    Specimen: Specimens were collected as described and send to the laboratory. Complications:   None were encountered during the procedure. EBL: < 10 ml.           Recommendations:   -f/u path with Dr. Tali Madison MD  1/18/2018  9:07 AM

## 2018-01-18 NOTE — ANESTHESIA POSTPROCEDURE EVALUATION
Post-Anesthesia Evaluation and Assessment    Patient: Braulio Pritchard MRN: [id number]  SSN: [id number]    YOB: 1990  Age: 32 y.o. Sex: female       Cardiovascular Function/Vital Signs  Visit Vitals    /82    Pulse 73    Temp 37 °C (98.6 °F)    Resp 13    Ht 5' 2\" (1.575 m)    Wt 109.5 kg (241 lb 8 oz)    SpO2 100%    Breastfeeding No    BMI 44.17 kg/m2       Patient is status post general, total IV anesthesia anesthesia for Procedure(s):  COLONOSCOPY  ESOPHAGOGASTRODUODENOSCOPY (EGD)  ESOPHAGOGASTRODUODENAL (EGD) BIOPSY  COLON BIOPSY. Nausea/Vomiting: None    Postoperative hydration reviewed and adequate. Pain:  Pain Scale 1: Numeric (0 - 10) (01/18/18 0925)  Pain Intensity 1: 0 (01/18/18 0925)   Managed    Neurological Status:   Neuro (WDL): Within Defined Limits (01/18/18 0925)  Neuro  Neurologic State: Alert;Eyes open spontaneously (01/18/18 0925)  LUE Motor Response: Purposeful;Spontaneous  (01/18/18 0925)  LLE Motor Response: Purposeful;Spontaneous  (01/18/18 0925)  RUE Motor Response: Purposeful;Spontaneous  (01/18/18 0925)  RLE Motor Response: Purposeful;Spontaneous  (01/18/18 0925)   At baseline    Mental Status and Level of Consciousness: Arousable    Pulmonary Status:   O2 Device: Room air (01/18/18 0925)   Adequate oxygenation and airway patent    Complications related to anesthesia: None    Post-anesthesia assessment completed.  No concerns    Signed By: Evy Magaña MD     January 18, 2018

## 2018-01-18 NOTE — DISCHARGE INSTRUCTIONS
Luis Alfredo Víctor  [id number]  1990    COLON / EGD DISCHARGE INSTRUCTIONS  Discomfort:  Sore throat- throat lozenges or warm salt water gargle  Redness at IV site- apply warm compress to area; if redness or soreness persist- contact your physician  There may be a slight amount of blood passed from the rectum  Gaseous discomfort- walking, belching will help relieve any discomfort  You may not operate a vehicle for 12 hours  You may not engage in an occupation involving machinery or appliances for rest of today  You may not drink alcoholic beverages for at least 12 hours  Avoid making any critical decisions for at least 24 hour  DIET:   Regular diet. - however -  remember your colon is empty and a heavy meal will produce gas. Avoid these foods:  vegetables, fried / greasy foods, carbonated drinks for today     ACTIVITY:  You may  resume your normal daily activities it is recommended that you spend the remainder of the day resting -  avoid any strenuous activity. CALL M.D. ANY SIGN OF:   Increasing pain, nausea, vomiting  Abdominal distension (swelling)  New increased bleeding (oral or rectal)  Fever (chills)  Pain in chest area  Bloody discharge from nose or mouth  Shortness of breath     Tylenol as needed for pain. Follow-up Instructions:   Call Dr. Kostas Krueger for results of procedure / biopsy in 7 days at telephone #  [phone]  Additional instructions: Make a follow up visit with Dr. Bull Corbett to discuss results. Luis Alfredo Víctor  [id number]  1990        DISCHARGE SUMMARY from Nurse    The following personal items collected during your admission are returned to you:   Dental Appliance: Dental Appliances: None  Vision: Visual Aid: Glasses  Hearing Aid:    Aletta Penta:    Clothing:    Other Valuables:    Valuables sent to safe:      PATIENT INSTRUCTIONS:    Take Home Medications:    DO NOT TAKE TYLENOL/ACETAMINOPHEN WITH PERCOCET, [address], [address]     TAKE NARCOTIC PAIN MEDICATIONS WITH FOOD     If given 2 pain narcotics do NOT take together! Narcotics tend to be constipating, we suggest taking a stool softener such as Colace or Miralax (follow package instructions). DO NOT DRIVE WHILE TAKING NARCOTIC PAIN MEDICATIONS. DO NOT TAKE SLEEPING MEDICATIONS OR ANTIANXIETY MEDICATIONS WHILE TAKING NARCOTIC PAIN MEDICATIONS,  ESPECIALLY THE NIGHT OF ANESTHESIA. CPAP PATIENTS BE SURE TO WEAR MACHINE WHENEVER NAPPING OR SLEEPING. DISCHARGE SUMMARY from Nurse    The following personal items collected during your admission are returned to you:   Dental Appliance: Dental Appliances: None  Vision: Visual Aid: Glasses  Hearing Aid:    Jewelry:    Clothing:    Other Valuables:    Valuables sent to safe:        PATIENT INSTRUCTIONS:    After General Anesthesia or Intravenous Sedation, for 24 hours or while taking prescription Narcotics:        Someone should be with you for the next 24 hours. For your own safety, a responsible adult must drive you home. · Limit your activities  · Recommended activity: Rest today, up with assistance today. Do not climb stairs or shower unattended for the next 24 hours. · Please start with a soft bland diet and advance as tolerated (no nausea) to regular diet. · If you have a sore throat you should try the following: fluids, warm salt water gargles, or throat lozenges. If it does not improve after several days please follow up with your primary physician. · Do not drive and operate hazardous machinery  · Do not make important personal or business decisions  · Do  not drink alcoholic beverages  · If you have not urinated within 8 hours after discharge, please contact your surgeon on call.     Report the following to your surgeon:  · Excessive pain, swelling, redness or odor of or around the surgical area  · Temperature over 100.5  · Nausea and vomiting lasting longer than 4 hours or if unable to take medications  · Any signs of decreased circulation or nerve impairment to extremity: change in color, persistent  numbness, tingling, coldness or increase pain      · You will receive a Post Operative Call from one of the Recovery Room Nurses on the day after your surgery to check on you. It is very important for us to know how you are recovering after your surgery. If you have an issue or need to speak with someone, please call your surgeon, do not wait for the post operative call. · You may receive an e-mail or letter in the mail from [facility] regarding your experience with us in the Ambulatory Surgery Unit. Your feedback is valuable to us and we appreciate your participation in the survey. · If the above instructions are not adequate, please contact Sariah Newell RN, Justine anesthesia Nurse Manager or our Anesthesiologist, at [phone]. If you are having problems after your surgery, call the physician at his office number. · We wish you a speedy recovery ? What to do at Home:      *  Please give a list of your current medications to your Primary Care Provider. *  Please update this list whenever your medications are discontinued, doses are      changed, or new medications (including over-the-counter products) are added. *  Please carry medication information at all times in case of emergency situations. These are general instructions for a healthy lifestyle:    No smoking/ No tobacco products/ Avoid exposure to second hand smoke    Surgeon General's Warning:  Quitting smoking now greatly reduces serious risk to your health.     Obesity, smoking, and sedentary lifestyle greatly increases your risk for illness    A healthy diet, regular physical exercise & weight monitoring are important for maintaining a healthy lifestyle    You may be retaining fluid if you have a history of heart failure or if you experience any of the following symptoms:  Weight gain of 3 pounds or more overnight or 5 pounds in a week, increased swelling in our hands or feet or shortness of breath while lying flat in bed. Please call your doctor as soon as you notice any of these symptoms; do not wait until your next office visit. Recognize signs and symptoms of STROKE:    B - Balance  E - Eyes    F-  Face looks uneven    A-  Arms unable to move or move even    S-  Speech slurred or non-existent    T-  Time-call 911 as soon as signs and symptoms begin-DO NOT go       Back to bed or wait to see if you get better-TIME IS BRAIN. If you have not received your influenza and/or pneumococcal vaccine, please follow up with your primary care physician. The discharge information has been reviewed with the patient and caregiver. The patient and caregiver verbalized understanding.

## 2018-01-18 NOTE — H&P
Gastroenterology Outpatient History and Physical    Patient: Yokasta Eaton    Physician: Madison Hurtado MD    Vital Signs: Blood pressure 142/73, pulse 93, temperature 98.2 °F (36.8 °C), resp. rate 12, height 5' 2\" (1.575 m), weight 109.5 kg (241 lb 8 oz), last menstrual period 12/21/2017, SpO2 100 %, not currently breastfeeding. Allergies: Allergies   Allergen Reactions    Shellfish Derived Swelling     Wheezing    Banana Other (comments)     Abdominal pain         Chief Complaint: Diarrhea    History of Present Illness: 31 yo F for chronic diarrhea. Justification for Procedure: above    History:  Past Medical History:   Diagnosis Date    Asthma     exercise / cold induced doesn't use inhaler    Interstitial cystitis       Past Surgical History:   Procedure Laterality Date    HX CHOLECYSTECTOMY        Social History     Social History    Marital status: SINGLE     Spouse name: N/A    Number of children: N/A    Years of education: N/A     Social History Main Topics    Smoking status: Never Smoker    Smokeless tobacco: Never Used    Alcohol use No    Drug use: No    Sexual activity: Yes     Partners: Male     Birth control/ protection: IUD     Other Topics Concern    None     Social History Narrative      Family History   Problem Relation Age of Onset    Hypertension Mother     Diabetes Mother     Diabetes Father     Hypertension Father     No Known Problems Sister     Cancer Brother        Medications:   Prior to Admission medications    Medication Sig Start Date End Date Taking? Authorizing Provider   OTHER Take 2 Tabs by mouth daily. Pill form of cholestramine   Yes Historical Provider   amoxicillin (AMOXIL) 875 mg tablet Take 1 Tab by mouth every twelve (12) hours for 10 days. 1/16/18 1/26/18 Yes Nermine Leonie Rodriguez MD   cholestyramine-sucrose [employer]) 4 gram powder Take  by mouth daily.    Yes Historical Provider   albuterol (PROVENTIL HFA, VENTOLIN HFA, PROAIR HFA) 90 mcg/actuation inhaler Take 2 Puffs by inhalation every four (4) hours as needed for Wheezing. 10/22/17  Yes Jarrod Donahue. MD Janessa   hydroCHLOROthiazide (HYDRODIURIL) 25 mg tablet Take 1 Tab by mouth daily. Indications: Edema, hypertension 4/28/17  Yes Ivy Sanchez MD   Cholecalciferol, Vitamin D3, (VITAMIN D3) 2,000 unit cap capsule Take 2,000 Units by mouth daily. Indications: VITAMIN D DEFICIENCY (HIGH DOSE THERAPY) 4/28/17  Yes Ivy Sanchez MD   cetirizine (ZYRTEC) 10 mg tablet Take 1 Tab by mouth daily. 4/28/17  Yes Ivy Sanchez MD   Peak Flow Meter elma 1 Each by Does Not Apply route two (2) times daily as needed. 10/24/17   Nieves Cleaning MD       Physical Exam:   General: alert, no distress   HEENT: Head: Normocephalic, no lesions, without obvious abnormality.    Heart: regular rate and rhythm, S1, S2 normal, no murmur, click, rub or gallop   Lungs: chest clear, no wheezing, rales, normal symmetric air entry   Abdominal: soft, nontender, nondistended, +BS   Neurological: Grossly normal   Extremities: extremities normal, atraumatic, no cyanosis or edema     Findings/Diagnosis: Diarrhea    Plan of Care/Planned Procedure: EGD & Colonoscopy with MAC    Signed By: Waqar Walsh MD     January 18, 2018

## 2018-01-22 ENCOUNTER — OFFICE VISIT (OUTPATIENT)
Dept: FAMILY MEDICINE CLINIC | Age: 28
End: 2018-01-22

## 2018-01-22 VITALS
HEART RATE: 74 BPM | RESPIRATION RATE: 20 BRPM | DIASTOLIC BLOOD PRESSURE: 72 MMHG | BODY MASS INDEX: 45.27 KG/M2 | HEIGHT: 62 IN | WEIGHT: 246 LBS | SYSTOLIC BLOOD PRESSURE: 114 MMHG | OXYGEN SATURATION: 99 % | TEMPERATURE: 97.9 F

## 2018-01-22 DIAGNOSIS — M25.471 ANKLE EDEMA, BILATERAL: ICD-10-CM

## 2018-01-22 DIAGNOSIS — E55.9 HYPOVITAMINOSIS D: ICD-10-CM

## 2018-01-22 DIAGNOSIS — E03.9 ACQUIRED HYPOTHYROIDISM: Primary | ICD-10-CM

## 2018-01-22 DIAGNOSIS — M25.472 ANKLE EDEMA, BILATERAL: ICD-10-CM

## 2018-01-22 DIAGNOSIS — E66.01 OBESITY, MORBID (HCC): ICD-10-CM

## 2018-01-22 DIAGNOSIS — E03.9 ACQUIRED HYPOTHYROIDISM: ICD-10-CM

## 2018-01-22 RX ORDER — LEVOTHYROXINE SODIUM 25 UG/1
25 TABLET ORAL
Qty: 30 TAB | Refills: 5 | Status: SHIPPED | OUTPATIENT
Start: 2018-01-22 | End: 2018-01-22 | Stop reason: SDUPTHER

## 2018-01-22 RX ORDER — LEVOTHYROXINE SODIUM 25 UG/1
25 TABLET ORAL
Qty: 30 TAB | Refills: 5 | Status: SHIPPED | OUTPATIENT
Start: 2018-01-22 | End: 2018-06-13 | Stop reason: ALTCHOICE

## 2018-01-22 RX ORDER — HYDROCHLOROTHIAZIDE 25 MG/1
25 TABLET ORAL DAILY
Qty: 30 TAB | Refills: 5 | Status: SHIPPED | OUTPATIENT
Start: 2018-01-22 | End: 2019-01-10

## 2018-01-22 RX ORDER — ACETAMINOPHEN 500 MG
2000 TABLET ORAL DAILY
Qty: 90 CAP | Refills: 5 | Status: SHIPPED | OUTPATIENT
Start: 2018-01-22 | End: 2019-06-27 | Stop reason: SDUPTHER

## 2018-01-22 NOTE — MR AVS SNAPSHOT
[facility] [place] [address] [facility] 
[phone] Patient: Mak Moss MRN: [id number] Visit Information Date & Time Provider Department Dept. Phone Encounter #  
 1/22/2018  7:45 AM Erma Douglass MD [address] at [address] [place] [address] [id number] Follow-up Instructions Return in about 3 months (around 4/22/2018), or if symptoms worsen or fail to improve, for routine follow up. Your Appointments 4/12/2018  9:20 AM  
New Patient with Terence Abreu MD  
[address] [facility] ([address]) Appt Note: Np refd by Dr. Nubia Quiroga for daytime fatigue [place] [address] [phone] [address] [place] [address]., [address] [phone] Upcoming Health Maintenance Date Due  
 PAP AKA CERVICAL CYTOLOGY 6/1/2018 DTaP/Tdap/Td series (2 - Td) 8/10/2027 Allergies as of 1/22/2018  Review Complete On: 1/22/2018 By: Erma Douglass MD  
  
 Severity Noted Reaction Type Reactions Shellfish Derived High 07/22/2015    Swelling Wheezing Banana  10/22/2017    Other (comments) Abdominal pain Current Immunizations  Never Reviewed Name Date Hep B Vaccine 11/29/2000, 6/12/2000, 5/8/2000 Hib 3/3/1992 Influenza Vaccine (Quad) PF 10/7/2016 MMR 9/13/1993, 3/27/1990 Poliovirus vaccine 9/13/1993, 3/3/1992, 5/31/1989, 1/19/1989 Not reviewed this visit You Were Diagnosed With   
  
 Codes Comments Acquired hypothyroidism    -  Primary ICD-10-CM: E03.9 ICD-9-CM: 244.9 Obesity, morbid ([facility] [address]     ICD-10-CM: E66.01 
ICD-9-CM: 278.01 Ankle edema, bilateral     ICD-10-CM: M25.471, M25.472 ICD-9-CM: 719.07 Hypovitaminosis D     ICD-10-CM: E55.9 ICD-9-CM: 268.9 Vitals BP Pulse Temp Resp Height(growth percentile) Weight(growth percentile) 114/72 74 97.9 °F (36.6 °C) (Oral) 20 5' 2\" (1.575 m) 246 lb (111.6 kg) LMP SpO2 BMI OB Status Smoking Status 01/19/2018 99% 44.99 kg/m2 Having regular periods Never Smoker Vitals History BMI and BSA Data Body Mass Index Body Surface Area 44.99 kg/m 2 2.21 m 2 Preferred Pharmacy Pharmacy Name Phone [facility] [address], [address] [place] [address] Drive [phone] Your Updated Medication List  
  
   
This list is accurate as of: 1/22/18  8:13 AM.  Always use your most recent med list.  
  
  
  
  
 albuterol 90 mcg/actuation inhaler Commonly known as:  PROVENTIL HFA, VENTOLIN HFA, PROAIR HFA Take 2 Puffs by inhalation every four (4) hours as needed for Wheezing. amoxicillin 875 mg tablet Commonly known as:  AMOXIL Take 1 Tab by mouth every twelve (12) hours for 10 days. cetirizine 10 mg tablet Commonly known as:  ZYRTEC Take 1 Tab by mouth daily. Cholecalciferol (Vitamin D3) 2,000 unit Cap capsule Commonly known as:  VITAMIN D3 Take 2,000 Units by mouth daily. Indications: VITAMIN D DEFICIENCY (HIGH DOSE THERAPY)  
  
 hydroCHLOROthiazide 25 mg tablet Commonly known as:  HYDRODIURIL Take 1 Tab by mouth daily. Indications: Edema, hypertension  
  
 levothyroxine 25 mcg tablet Commonly known as:  SYNTHROID Take 1 Tab by mouth Daily (before breakfast). Indications: Hashimoto Thyroiditis OTHER Take 2 Tabs by mouth daily. Pill form of cholestramine Peak Flow Meter Kathy  
1 Each by Does Not Apply route two (2) times daily as needed. QUESTRAN 4 gram powder Generic drug:  cholestyramine-sucrose Take  by mouth daily. Prescriptions Sent to Pharmacy Refills  
 hydroCHLOROthiazide (HYDRODIURIL) 25 mg tablet 5 Sig: Take 1 Tab by mouth daily. Indications: Edema, hypertension  Class: Normal  
 Pharmacy: [facility] [address] [place] [address] Ph #: [phone] Route: Oral  
 levothyroxine (SYNTHROID) 25 mcg tablet 5 Sig: Take 1 Tab by mouth Daily (before breakfast). Indications: Hashimoto Thyroiditis Class: Normal  
 Pharmacy: [facility] [address] [place] [address] Ph #: [phone] Route: Oral  
 Cholecalciferol, Vitamin D3, (VITAMIN D3) 2,000 unit cap capsule 5 Sig: Take 2,000 Units by mouth daily. Indications: VITAMIN D DEFICIENCY (HIGH DOSE THERAPY) Class: Normal  
 Pharmacy: [facility] [address] [place] [address] Ph #: [phone] Route: Oral  
  
Follow-up Instructions Return in about 3 months (around 4/22/2018), or if symptoms worsen or fail to improve, for routine follow up. Patient Instructions Levothyroxine (Levothroid, Levoxyl, Synthroid, Tirosint) - (By mouth) Why this medicine is used:  
Treats hypothyroidism, an enlarged thyroid gland, and thyroid cancer. Contact a nurse or doctor right away if you have: 
· Fast, pounding, or uneven heartbeat · Seizures Common side effects: 
· Appetite or weight changes · Changes in menstrual periods (women) · Hair loss · Nervousness, sensitivity to heat, sweating © 2017 [address]  Information is for End User's use only and may not be sold, redistributed or otherwise used for commercial purposes. Introducing [facility]! Dear Fernandez Saldana: 
Thank you for requesting a [employer] account. Our records indicate that you already have an active [employer] account. You can access your account anytime at [email or web address] Did you know that you can access your hospital and ER discharge instructions at any time in [employer]hao?   You can also review all of your test results from your hospital stay or ER visit. Additional Information If you have questions, please visit the Frequently Asked Questions section of the [employer] website at [email or web address] Remember, [employer] is NOT to be used for urgent needs. For medical emergencies, dial 911. Now available from your iPhone and Android! Please provide this summary of care documentation to your next provider. Your primary care clinician is listed as Alfreda Pastor. If you have any questions after today's visit, please call [phone].

## 2018-01-22 NOTE — PATIENT INSTRUCTIONS
Levothyroxine (Levothroid, Levoxyl, Synthroid, Tirosint) - (By mouth)   Why this medicine is used:   Treats hypothyroidism, an enlarged thyroid gland, and thyroid cancer. Contact a nurse or doctor right away if you have:  · Fast, pounding, or uneven heartbeat  · Seizures     Common side effects:  · Appetite or weight changes  · Changes in menstrual periods (women)  · Hair loss  · Nervousness, sensitivity to heat, sweating  © 2017 [address]  Information is for End User's use only and may not be sold, redistributed or otherwise used for commercial purposes. Body Mass Index: Care Instructions  Your Care Instructions    Body mass index (BMI) can help you see if your weight is raising your risk for health problems. It uses a formula to compare how much you weigh with how tall you are. · A BMI lower than 18.5 is considered underweight. · A BMI between 18.5 and 24.9 is considered healthy. · A BMI between 25 and 29.9 is considered overweight. A BMI of 30 or higher is considered obese. If your BMI is in the normal range, it means that you have a lower risk for weight-related health problems. If your BMI is in the overweight or obese range, you may be at increased risk for weight-related health problems, such as high blood pressure, heart disease, stroke, arthritis or joint pain, and diabetes. If your BMI is in the underweight range, you may be at increased risk for health problems such as fatigue, lower protection (immunity) against illness, muscle loss, bone loss, hair loss, and hormone problems. BMI is just one measure of your risk for weight-related health problems. You may be at higher risk for health problems if you are not active, you eat an unhealthy diet, or you drink too much alcohol or use tobacco products. Follow-up care is a key part of your treatment and safety. Be sure to make and go to all appointments, and call your doctor if you are having problems.  It's also a good idea to know your test results and keep a list of the medicines you take. How can you care for yourself at home? · Practice healthy eating habits. This includes eating plenty of fruits, vegetables, whole grains, lean protein, and low-fat dairy. · If your doctor recommends it, get more exercise. Walking is a good choice. Bit by bit, increase the amount you walk every day. Try for at least 30 minutes on most days of the week. · Do not smoke. Smoking can increase your risk for health problems. If you need help quitting, talk to your doctor about stop-smoking programs and medicines. These can increase your chances of quitting for good. · Limit alcohol to 2 drinks a day for men and 1 drink a day for women. Too much alcohol can cause health problems. If you have a BMI higher than 25  · Your doctor may do other tests to check your risk for weight-related health problems. This may include measuring the distance around your waist. A waist measurement of more than 40 inches in men or 35 inches in women can increase the risk of weight-related health problems. · Talk with your doctor about steps you can take to stay healthy or improve your health. You may need to make lifestyle changes to lose weight and stay healthy, such as changing your diet and getting regular exercise. If you have a BMI lower than 18.5  · Your doctor may do other tests to check your risk for health problems. · Talk with your doctor about steps you can take to stay healthy or improve your health. You may need to make lifestyle changes to gain or maintain weight and stay healthy, such as getting more healthy foods in your diet and doing exercises to build muscle. Where can you learn more? Go to http://jamin-neisha.info/. Enter S176 in the search box to learn more about \"Body Mass Index: Care Instructions. \"  Current as of: October 13, 2016  Content Version: 11.4  © 9897-4148 [employer].  Care instructions adapted under license by [address] (which disclaims liability or warranty for this information). If you have questions about a medical condition or this instruction, always ask your healthcare professional. [address] any warranty or liability for your use of this information.

## 2018-01-22 NOTE — PROGRESS NOTES
Chief Complaint   Patient presents with    Results     Patient here today for results from [place] [address].

## 2018-01-22 NOTE — PROGRESS NOTES
Chief Complaint   Patient presents with    Results     HPI:  Doloris Duane is a 32 y.o. female and presents with Results  Except for TSH elevation of 5.010 uIU/ml, the rest of your results are normal  Patient is also complaining of fatigue and sluggishness. Review of Systems  As per hpi    Past Medical History:   Diagnosis Date    Asthma     exercise / cold induced doesn't use inhaler    Interstitial cystitis      Past Surgical History:   Procedure Laterality Date    COLONOSCOPY N/A 1/18/2018    COL[facility] Kim [facility] AM[facility]    HX CHOLECYSTECTOMY       Social History     Social History    Marital status: SINGLE     Spouse name: N/A    Number of children: N/A    Years of education: N/A     Social History Main Topics    Smoking status: Never Smoker    Smokeless tobacco: Never Used    Alcohol use No    Drug use: No    Sexual activity: Yes     Partners: Male     Birth control/ protection: IUD     Other Topics Concern    None     Social History Narrative     Current Outpatient Prescriptions   Medication Sig Dispense Refill    OTHER Take 2 Tabs by mouth daily. Pill form of cholestramine      amoxicillin (AMOXIL) 875 mg tablet Take 1 Tab by mouth every twelve (12) hours for 10 days. 20 Tab 0    cholestyramine-sucrose (QUESTRAN) 4 gram powder Take  by mouth daily.  Peak Flow Meter elma 1 Each by Does Not Apply route two (2) times daily as needed. 1 Device 0    albuterol (PROVENTIL HFA, VENTOLIN HFA, PROAIR HFA) 90 mcg/actuation inhaler Take 2 Puffs by inhalation every four (4) hours as needed for Wheezing. 1 Inhaler 0    hydroCHLOROthiazide (HYDRODIURIL) 25 mg tablet Take 1 Tab by mouth daily. Indications: Edema, hypertension 30 Tab 5    Cholecalciferol, Vitamin D3, (VITAMIN D3) 2,000 unit cap capsule Take 2,000 Units by mouth daily. Indications: VITAMIN D DEFICIENCY (HIGH DOSE THERAPY) 90 Cap 5    cetirizine (ZYRTEC) 10 mg tablet Take 1 Tab by mouth daily. 30 Tab 4     Allergies   Allergen Reactions    Shellfish Derived Swelling     Wheezing    Banana Other (comments)     Abdominal pain       Objective:  Visit Vitals    /72    Pulse 74    Temp 97.9 °F (36.6 °C) (Oral)    Resp 20    Ht 5' 2\" (1.575 m)    Wt 246 lb (111.6 kg)    LMP 01/19/2018    SpO2 99%    BMI 44.99 kg/m2     Physical Exam:   General appearance - alert, well appearing in no distress  EYE-PERRL, EOMI  ENT-ENT exam normal, no neck nodes or sinus tenderness  Mouth - mucous membranes moist, pharynx normal without lesions  Neck - supple, no significant adenopathy   Chest - clear to auscultation, no wheezes, rales or rhonchi  Heart - normal rate, regular rhythm, normal blood pressure  Abdomen - soft, nontender, nondistended, no organomegaly  Ext-peripheral pulses normal, no pedal edema  Neuro -alert, oriented, normal speech, no focal findings   Back-full range of motion, no tenderness, palpable spasm or pain on motion     Results for orders placed or performed during the hospital encounter of 01/18/18   HCG URINE, QL. - POC   Result Value Ref Range    Pregnancy test,urine (POC) NEGATIVE  NEG       Assessment/Plan:    ICD-10-CM ICD-9-CM    1. Acquired hypothyroidism E03.9 244.9 levothyroxine (SYNTHROID) 25 mcg tablet   2. Obesity, morbid ([address] E66.01 278.01    3. Ankle edema, bilateral M25.471 719.07 hydroCHLOROthiazide (HYDRODIURIL) 25 mg tablet    M25.472     4. Hypovitaminosis D E55.9 268.9 Cholecalciferol, Vitamin D3, (VITAMIN D3) 2,000 unit cap capsule     Patient Instructions      Levothyroxine (Levothroid, Levoxyl, Synthroid, Tirosint) - (By mouth)   Why this medicine is used:   Treats hypothyroidism, an enlarged thyroid gland, and thyroid cancer.   Contact a nurse or doctor right away if you have:  · Fast, pounding, or uneven heartbeat  · Seizures     Common side effects:  · Appetite or weight changes  · Changes in menstrual periods (women)  · Hair loss  · Nervousness, sensitivity to heat, sweating  © 2017 [facility] Information is for End User's use only and may not be sold, redistributed or otherwise used for commercial purposes. Follow-up Disposition:  Return in about 3 months (around 4/22/2018), or if symptoms worsen or fail to improve, for routine follow up. Discussed the patient's BMI with her. The BMI follow up plan is as follows:     dietary management education, guidance, and counseling  encourage exercise  monitor weight  prescribed dietary intake    An After Visit Summary was printed and given to the patient.

## 2018-02-16 DIAGNOSIS — T78.40XA ALLERGIC REACTION, INITIAL ENCOUNTER: Primary | ICD-10-CM

## 2018-02-16 DIAGNOSIS — L50.9 FULL BODY HIVES: ICD-10-CM

## 2018-02-16 RX ORDER — METHYLPREDNISOLONE 4 MG/1
TABLET ORAL
Qty: 1 DOSE PACK | Refills: 0 | Status: SHIPPED | OUTPATIENT
Start: 2018-02-16 | End: 2018-06-07 | Stop reason: ALTCHOICE

## 2018-02-16 NOTE — PROGRESS NOTES
Verbal order given to writer from Dr. Ema Parsons, VO for writer to give pt solumedrol injection at this time, and have sent a medrol dose pk to [facility] which has been done at this time.

## 2018-06-07 ENCOUNTER — OFFICE VISIT (OUTPATIENT)
Dept: FAMILY MEDICINE CLINIC | Age: 28
End: 2018-06-07

## 2018-06-07 VITALS
SYSTOLIC BLOOD PRESSURE: 128 MMHG | OXYGEN SATURATION: 99 % | RESPIRATION RATE: 20 BRPM | WEIGHT: 241 LBS | DIASTOLIC BLOOD PRESSURE: 84 MMHG | BODY MASS INDEX: 44.35 KG/M2 | HEIGHT: 62 IN | HEART RATE: 81 BPM | TEMPERATURE: 97.3 F

## 2018-06-07 DIAGNOSIS — R25.2 MUSCLE CRAMPS: ICD-10-CM

## 2018-06-07 DIAGNOSIS — R79.89 ELEVATED TSH: ICD-10-CM

## 2018-06-07 DIAGNOSIS — M25.50 PAIN IN JOINTS: Primary | ICD-10-CM

## 2018-06-07 DIAGNOSIS — R53.82 CHRONIC FATIGUE: ICD-10-CM

## 2018-06-07 NOTE — PATIENT INSTRUCTIONS
Muscle Cramps: Care Instructions  Your Care Instructions    A muscle cramp occurs when a muscle tightens up suddenly. A cramp often happens in the legs. A muscle cramp is also called a muscle spasm or a charley horse. Muscle cramps usually last less than a minute. However, the pain may last for several minutes. Leg cramps that occur at night may wake you up. Heavy exercise, dehydration, and being overweight can increase your risk of getting cramps. An imbalance of certain chemicals in your blood, called electrolytes, can also lead to muscle cramps. Pregnant women sometimes get muscle cramps during sleep. Muscle cramps can be treated by stretching and massaging the muscle. If cramps keep coming back, your doctor may prescribe medicine that relaxes your muscles. Follow-up care is a key part of your treatment and safety. Be sure to make and go to all appointments, and call your doctor if you are having problems. It's also a good idea to know your test results and keep a list of the medicines you take. How can you care for yourself at home? · Drink plenty of fluids to prevent dehydration. Choose water and other caffeine-free clear liquids until you feel better. If you have kidney, heart, or liver disease and have to limit fluids, talk with your doctor before you increase the amount of fluids you drink. · Stretch your muscles every day, especially before and after exercise and at bedtime. Regular stretching can relax your muscles and may prevent cramps. · Do not suddenly increase the amount of exercise you get. Increase your exercise a little each week. · When you get a cramp, stretch and massage the muscle. You can also take a warm shower or bath to relax the muscle. A heating pad placed on the muscle can also help. · Take a daily multivitamin supplement. · Ask your doctor if you can take an over-the-counter pain medicine, such as acetaminophen (Tylenol), ibuprofen (Advil, Motrin), or naproxen (Aleve). Be safe with medicines. Read and follow all instructions on the label. When should you call for help? Watch closely for changes in your health, and be sure to contact your doctor if:  ? · You get muscle cramps often that do not go away after home treatment. ? · Your muscle cramps often wake you up at night. ? · You do not get better as expected. Where can you learn more? Go to http://jamin-neisha.info/. Enter T269 in the search box to learn more about \"Muscle Cramps: Care Instructions. \"  Current as of: March 21, 2017  Content Version: 11.4  © 3226-8082 [employer]. Care instructions adapted under license by [employer] (which disclaims liability or warranty for this information). If you have questions about a medical condition or this instruction, always ask your healthcare professional. [address] any warranty or liability for your use of this information.

## 2018-06-07 NOTE — MR AVS SNAPSHOT
[facility] [address] [facility] 
[phone] Patient: Corrinne Bowling MRN: [id number] OPB:[id number] Visit Information Date & Time Provider Department Dept. Phone Encounter #  
 6/7/2018  9:15 AM María Hager MD [facility] at [address] [place] [address] Road [id number] Follow-up Instructions Return 1 week, for f/u results. Upcoming Health Maintenance Date Due  
 PAP AKA CERVICAL CYTOLOGY 6/1/2018 Influenza Age 5 to Adult 8/1/2018 DTaP/Tdap/Td series (2 - Td) 8/10/2027 Allergies as of 6/7/2018  Review Complete On: 6/7/2018 By: María Hager MD  
  
 Severity Noted Reaction Type Reactions Shellfish Derived High 07/22/2015    Swelling Wheezing Banana  10/22/2017    Other (comments) Abdominal pain Current Immunizations  Never Reviewed Name Date Hep B Vaccine 11/29/2000, 6/12/2000, 5/8/2000 Hib 3/3/1992 Influenza Vaccine (Quad) PF 10/7/2016 MMR 9/13/1993, 3/27/1990 Poliovirus vaccine 9/13/1993, 3/3/1992, 5/31/1989, 1/19/1989 Not reviewed this visit You Were Diagnosed With   
  
 Codes Comments Pain in joints    -  Primary ICD-10-CM: M25.50 ICD-9-CM: 719.49 Muscle cramps     ICD-10-CM: R25.2 ICD-9-CM: 729.82 Elevated TSH     ICD-10-CM: R94.6 ICD-9-CM: 794.5 Chronic fatigue     ICD-10-CM: R53.82 
ICD-9-CM: 780.79 Vitals BP Pulse Temp Resp Height(growth percentile) Weight(growth percentile) 128/84 81 97.3 °F (36.3 °C) (Oral) 20 5' 2\" (1.575 m) 241 lb (109.3 kg) SpO2 BMI OB Status Smoking Status 99% 44.08 kg/m2 Having regular periods Never Smoker Vitals History BMI and BSA Data Body Mass Index Body Surface Area 44.08 kg/m 2 2.19 m 2 Preferred Pharmacy Pharmacy Name Phone [id number] [place] [address] [phone] Your Updated Medication List  
  
   
This list is accurate as of 6/7/18 10:06 AM.  Always use your most recent med list.  
  
  
  
  
 albuterol 90 mcg/actuation inhaler Commonly known as:  PROVENTIL HFA, VENTOLIN HFA, PROAIR HFA Take 2 Puffs by inhalation every four (4) hours as needed for Wheezing. cetirizine 10 mg tablet Commonly known as:  ZYRTEC Take 1 Tab by mouth daily. Cholecalciferol (Vitamin D3) 2,000 unit Cap capsule Commonly known as:  VITAMIN D3 Take 2,000 Units by mouth daily. Indications: VITAMIN D DEFICIENCY (HIGH DOSE THERAPY)  
  
 hydroCHLOROthiazide 25 mg tablet Commonly known as:  HYDRODIURIL Take 1 Tab by mouth daily. Indications: Edema, hypertension  
  
 levothyroxine 25 mcg tablet Commonly known as:  SYNTHROID Take 1 Tab by mouth Daily (before breakfast). Indications: Hashimoto Thyroiditis OTHER Take 2 Tabs by mouth daily. Pill form of cholestramine Peak Flow Meter Kathy  
1 Each by Does Not Apply route two (2) times daily as needed. QUESTRAN 4 gram powder Generic drug:  cholestyramine-sucrose Take  by mouth daily. We Performed the Following LYME AB, IGG & IGM BY WB [55632 CPT(R)] METABOLIC PANEL, COMPREHENSIVE [96960 CPT(R)]   
 RA + CCP ABS [TRM30323 Custom] SED RATE (ESR) G0990021 CPT(R)] TSH 3RD GENERATION [64476 CPT(R)] Follow-up Instructions Return 1 week, for f/u results. Patient Instructions Muscle Cramps: Care Instructions Your Care Instructions A muscle cramp occurs when a muscle tightens up suddenly. A cramp often happens in the legs. A muscle cramp is also called a muscle spasm or a charley horse. Muscle cramps usually last less than a minute. However, the pain may last for several minutes. Leg cramps that occur at night may wake you up.  
Heavy exercise, dehydration, and being overweight can increase your risk of getting cramps. An imbalance of certain chemicals in your blood, called electrolytes, can also lead to muscle cramps. Pregnant women sometimes get muscle cramps during sleep. Muscle cramps can be treated by stretching and massaging the muscle. If cramps keep coming back, your doctor may prescribe medicine that relaxes your muscles. Follow-up care is a key part of your treatment and safety. Be sure to make and go to all appointments, and call your doctor if you are having problems. It's also a good idea to know your test results and keep a list of the medicines you take. How can you care for yourself at home? · Drink plenty of fluids to prevent dehydration. Choose water and other caffeine-free clear liquids until you feel better. If you have kidney, heart, or liver disease and have to limit fluids, talk with your doctor before you increase the amount of fluids you drink. · Stretch your muscles every day, especially before and after exercise and at bedtime. Regular stretching can relax your muscles and may prevent cramps. · Do not suddenly increase the amount of exercise you get. Increase your exercise a little each week. · When you get a cramp, stretch and massage the muscle. You can also take a warm shower or bath to relax the muscle. A heating pad placed on the muscle can also help. · Take a daily multivitamin supplement. · Ask your doctor if you can take an over-the-counter pain medicine, such as acetaminophen (Tylenol), ibuprofen (Advil, Motrin), or naproxen (Aleve). Be safe with medicines. Read and follow all instructions on the label. When should you call for help? Watch closely for changes in your health, and be sure to contact your doctor if: 
? · You get muscle cramps often that do not go away after home treatment. ? · Your muscle cramps often wake you up at night. ? · You do not get better as expected. Where can you learn more? Go to http://jamin-neisha.info/. Enter V258 in the search box to learn more about \"Muscle Cramps: Care Instructions. \" Current as of: March 21, 2017 Content Version: 11.4 © 7675-3317 [employer]You. Care instructions adapted under license by [employer] (which disclaims liability or warranty for this information). If you have questions about a medical condition or this instruction, always ask your healthcare professional. [address] any warranty or liability for your use of this information. Introducing [facility] SERVICES! Dear Valentino Grizzle: 
Thank you for requesting a Virtify account. Our records indicate that you already have an active Virtify account. You can access your account anytime at [email or web address] Did you know that you can access your hospital and ER discharge instructions at any time in Virtify? You can also review all of your test results from your hospital stay or ER visit. Additional Information If you have questions, please visit the Frequently Asked Questions section of the Virtify website at [email or web address] Remember, Virtify is NOT to be used for urgent needs. For medical emergencies, dial 911. Now available from your iPhone and Android! Please provide this summary of care documentation to your next provider. Lyme Disease Testing Disclaimer:   
 § 70.5-6978.6. (Expires July 1, 2018) Lyme disease testing information disclosure. A. Every licensee or his in-office designee who orders a laboratory test for the presence of Lyme disease shall provide to the patient or his legal representative the following written information: \"ACCORDING TO THE CENTERS FOR DISEASE CONTROL AND PREVENTION, AS OF 2011 LYME DISEASE IS THE SIXTH FASTEST GROWING DISEASE IN THE [place].   
YOUR HEALTH CARE PROVIDER HAS ORDERED A LABORATORY TEST FOR THE PRESENCE OF LYME DISEASE FOR YOU. CURRENT LABORATORY TESTING FOR LYME DISEASE CAN BE PROBLEMATIC AND STANDARD LABORATORY TESTS OFTEN RESULT IN FALSE NEGATIVE AND FALSE POSITIVE RESULTS, AND IF DONE TOO EARLY, YOU MAY NOT HAVE PRODUCED ENOUGH ANTIBODIES TO BE CONSIDERED POSITIVE BECAUSE YOUR IMMUNE RESPONSE REQUIRES TIME TO DEVELOP ANTIBODIES. IF YOU ARE TESTED FOR LYME DISEASE, AND THE RESULTS ARE NEGATIVE, THIS DOES NOT NECESSARILY MEAN YOU DO NOT HAVE LYME DISEASE. IF YOU CONTINUE TO EXPERIENCE SYMPTOMS, YOU SHOULD CONTACT YOUR HEALTH CARE PROVIDER AND INQUIRE ABOUT THE APPROPRIATENESS OF RETESTING OR ADDITIONAL TREATMENT. \"  
B. Licensees shall be immune from civil liability for the provision of the written information required by this section absent gross negligence or willful misconduct. Your primary care clinician is listed as Aruna Sotelo. If you have any questions after today's visit, please call [phone].

## 2018-06-11 ENCOUNTER — OFFICE VISIT (OUTPATIENT)
Dept: FAMILY MEDICINE CLINIC | Age: 28
End: 2018-06-11

## 2018-06-11 VITALS
BODY MASS INDEX: 44.35 KG/M2 | SYSTOLIC BLOOD PRESSURE: 121 MMHG | HEIGHT: 62 IN | TEMPERATURE: 98 F | RESPIRATION RATE: 20 BRPM | DIASTOLIC BLOOD PRESSURE: 76 MMHG | OXYGEN SATURATION: 100 % | WEIGHT: 241 LBS | HEART RATE: 83 BPM

## 2018-06-11 DIAGNOSIS — F32.A FATIGUE DUE TO DEPRESSION: Primary | ICD-10-CM

## 2018-06-11 DIAGNOSIS — E66.01 OBESITY, MORBID (HCC): ICD-10-CM

## 2018-06-11 DIAGNOSIS — R53.83 FATIGUE DUE TO DEPRESSION: Primary | ICD-10-CM

## 2018-06-11 LAB
ALBUMIN SERPL-MCNC: 4.4 G/DL (ref 3.5–5.5)
ALBUMIN/GLOB SERPL: 1.6 {RATIO} (ref 1.2–2.2)
ALP SERPL-CCNC: 87 IU/L (ref 39–117)
ALT SERPL-CCNC: 15 IU/L (ref 0–32)
AST SERPL-CCNC: 17 IU/L (ref 0–40)
B BURGDOR IGG PATRN SER IB-IMP: NEGATIVE
B BURGDOR IGM PATRN SER IB-IMP: NEGATIVE
B BURGDOR18KD IGG SER QL IB: NORMAL
B BURGDOR23KD IGG SER QL IB: NORMAL
B BURGDOR23KD IGM SER QL IB: NORMAL
B BURGDOR28KD IGG SER QL IB: NORMAL
B BURGDOR30KD IGG SER QL IB: NORMAL
B BURGDOR39KD IGG SER QL IB: NORMAL
B BURGDOR39KD IGM SER QL IB: NORMAL
B BURGDOR41KD IGG SER QL IB: NORMAL
B BURGDOR41KD IGM SER QL IB: NORMAL
B BURGDOR45KD IGG SER QL IB: NORMAL
B BURGDOR58KD IGG SER QL IB: NORMAL
B BURGDOR66KD IGG SER QL IB: NORMAL
B BURGDOR93KD IGG SER QL IB: NORMAL
BILIRUB SERPL-MCNC: 0.2 MG/DL (ref 0–1.2)
BUN SERPL-MCNC: 9 MG/DL (ref 6–20)
BUN/CREAT SERPL: 14 (ref 9–23)
CALCIUM SERPL-MCNC: 9.6 MG/DL (ref 8.7–10.2)
CCP IGA+IGG SERPL IA-ACNC: <1 UNITS (ref 0–19)
CHLORIDE SERPL-SCNC: 100 MMOL/L (ref 96–106)
CO2 SERPL-SCNC: 23 MMOL/L (ref 18–29)
CREAT SERPL-MCNC: 0.65 MG/DL (ref 0.57–1)
ERYTHROCYTE [SEDIMENTATION RATE] IN BLOOD BY WESTERGREN METHOD: 6 MM/HR (ref 0–32)
GFR SERPLBLD CREATININE-BSD FMLA CKD-EPI: 120 ML/MIN/1.73
GFR SERPLBLD CREATININE-BSD FMLA CKD-EPI: 139 ML/MIN/1.73
GLOBULIN SER CALC-MCNC: 2.8 G/DL (ref 1.5–4.5)
GLUCOSE SERPL-MCNC: 94 MG/DL (ref 65–99)
POTASSIUM SERPL-SCNC: 4.2 MMOL/L (ref 3.5–5.2)
PROT SERPL-MCNC: 7.2 G/DL (ref 6–8.5)
RHEUMATOID FACT SERPL-ACNC: <10 IU/ML (ref 0–13.9)
SODIUM SERPL-SCNC: 138 MMOL/L (ref 134–144)
TSH SERPL DL<=0.005 MIU/L-ACNC: 9.73 UIU/ML (ref 0.45–4.5)

## 2018-06-11 NOTE — PROGRESS NOTES
Chief Complaint   Patient presents with    Results     HPI:  Vasyl Back is a 32 y.o. female presents to discuss results but labs have not be relieved by  [facility].  I will call patient with results. Review of Systems  As per hpi    Past Medical History:   Diagnosis Date    Asthma     exercise / cold induced doesn't use inhaler    Interstitial cystitis      Past Surgical History:   Procedure Laterality Date    COLONOSCOPY N/A 1/18/2018    COLONOSCOPY performed by Kylah eRed MD at [facility] AMBULATORY OR    HX CHOLECYSTECTOMY       Social History     Social History    Marital status: SINGLE     Spouse name: N/A    Number of children: N/A    Years of education: N/A     Social History Main Topics    Smoking status: Never Smoker    Smokeless tobacco: Never Used    Alcohol use No    Drug use: No    Sexual activity: Yes     Partners: Male     Birth control/ protection: IUD     Other Topics Concern    Not on file     Social History Narrative     Current Outpatient Prescriptions   Medication Sig Dispense Refill    hydroCHLOROthiazide (HYDRODIURIL) 25 mg tablet Take 1 Tab by mouth daily. Indications: Edema, hypertension 30 Tab 5    Cholecalciferol, Vitamin D3, (VITAMIN D3) 2,000 unit cap capsule Take 2,000 Units by mouth daily. Indications: VITAMIN D DEFICIENCY (HIGH DOSE THERAPY) 90 Cap 5    levothyroxine (SYNTHROID) 25 mcg tablet Take 1 Tab by mouth Daily (before breakfast). Indications: Hashimoto Thyroiditis 30 Tab 5    OTHER Take 2 Tabs by mouth daily. Pill form of cholestramine      cholestyramine-sucrose (QUESTRAN) 4 gram powder Take  by mouth daily.  Peak Flow Meter elma 1 Each by Does Not Apply route two (2) times daily as needed. 1 Device 0    albuterol (PROVENTIL HFA, VENTOLIN HFA, PROAIR HFA) 90 mcg/actuation inhaler Take 2 Puffs by inhalation every four (4) hours as needed for Wheezing. 1 Inhaler 0    cetirizine (ZYRTEC) 10 mg tablet Take 1 Tab by mouth daily.  30 Tab 4 Allergies   Allergen Reactions    Shellfish Derived Swelling     Wheezing    Banana Other (comments)     Abdominal pain       Objective:  Visit Vitals    /76    Pulse 83    Temp 98 °F (36.7 °C) (Oral)    Resp 20    Ht 5' 2\" (1.575 m)    Wt 241 lb (109.3 kg)    LMP 05/16/2018    SpO2 100%    BMI 44.08 kg/m2     Physical Exam:   General appearance - alert, well appearing in no distress  EYE-PERRL, EOMI  Neck - supple, no significant adenopathy   Chest - clear to auscultation, no wheezes, rales or rhonchi  Heart - normal rate, regular rhythm, normal blood pressure  Abdomen - soft, nontender, nondistended, no organomegaly    Assessment/Plan:  Diagnoses and all orders for this visit:    Fatigue due to depression    Obesity, morbid ([address]

## 2018-06-13 DIAGNOSIS — E03.9 ACQUIRED HYPOTHYROIDISM: Primary | ICD-10-CM

## 2018-06-13 RX ORDER — LEVOTHYROXINE SODIUM 25 UG/1
25 TABLET ORAL
Qty: 30 TAB | Refills: 3 | Status: SHIPPED | OUTPATIENT
Start: 2018-06-13 | End: 2018-08-13 | Stop reason: DRUGHIGH

## 2018-08-09 ENCOUNTER — OFFICE VISIT (OUTPATIENT)
Dept: ENDOCRINOLOGY | Age: 28
End: 2018-08-09

## 2018-08-09 VITALS
HEIGHT: 62 IN | BODY MASS INDEX: 43.87 KG/M2 | DIASTOLIC BLOOD PRESSURE: 80 MMHG | WEIGHT: 238.4 LBS | HEART RATE: 83 BPM | SYSTOLIC BLOOD PRESSURE: 131 MMHG

## 2018-08-09 DIAGNOSIS — E03.9 ACQUIRED HYPOTHYROIDISM: Primary | ICD-10-CM

## 2018-08-09 NOTE — MR AVS SNAPSHOT
[address] [place] [address] [address] [address] [phone] Patient: Cristhian Tam MRN: [id number] XH Visit Information Date & Time Provider Department Dept. Phone Encounter #  
 2018  9:50 AM Shamar Harrington, [address] [place] [address] Diabetes and Endocrinology [phone] [id number] Follow-up Instructions Return in about 3 months (around 2018). Upcoming Health Maintenance Date Due  
 PAP AKA CERVICAL CYTOLOGY 2018 Influenza Age 5 to Adult 2018 DTaP/Tdap/Td series (2 - Td) 8/10/2027 Allergies as of 2018  Review Complete On: 2018 By: Shamar Harrington MD  
  
 Severity Noted Reaction Type Reactions Shellfish Derived High 2015    Swelling Wheezing Banana  10/22/2017    Other (comments) Abdominal pain Keflex [Cephalexin]  2018    Rash Current Immunizations  Never Reviewed Name Date Hep B Vaccine 2000, 2000, 2000 Hib 3/3/1992 Influenza Vaccine (Quad) PF 10/7/2016 MMR 1993, 3/27/1990 Poliovirus vaccine 1993, 3/3/1992, 1989, 1989 Not reviewed this visit You Were Diagnosed With   
  
 Codes Comments Acquired hypothyroidism    -  Primary ICD-10-CM: E03.9 ICD-9-CM: 502. 9 Vitals BP Pulse Height(growth percentile) Weight(growth percentile) BMI OB Status 131/80 83 5' 2\" (1.575 m) 238 lb 6.4 oz (108.1 kg) 43.6 kg/m2 Having regular periods Smoking Status Never Smoker Vitals History BMI and BSA Data Body Mass Index Body Surface Area  
 43.6 kg/m 2 2.17 m 2 Preferred Pharmacy Pharmacy Name Phone [facility] [address] [address] [place] [address] [phone] Your Updated Medication List  
  
   
This list is accurate as of 18 10:34 AM.  Always use your most recent med list.  
  
  
  
  
 albuterol 90 mcg/actuation inhaler Commonly known as:  PROVENTIL HFA, VENTOLIN HFA, PROAIR HFA Take 2 Puffs by inhalation every four (4) hours as needed for Wheezing. cetirizine 10 mg tablet Commonly known as:  ZYRTEC Take 1 Tab by mouth daily. Cholecalciferol (Vitamin D3) 2,000 unit Cap capsule Commonly known as:  VITAMIN D3 Take 2,000 Units by mouth daily. Indications: VITAMIN D DEFICIENCY (HIGH DOSE THERAPY)  
  
 hydroCHLOROthiazide 25 mg tablet Commonly known as:  HYDRODIURIL Take 1 Tab by mouth daily. Indications: Edema, hypertension  
  
 levothyroxine 25 mcg tablet Commonly known as:  LEVOXYL Take 1 Tab by mouth Daily (before breakfast). QUESTRAN 4 gram powder Generic drug:  cholestyramine-sucrose Take  by mouth daily. We Performed the Following T4, FREE [id number] CPT(R)] THYROID ANTIBODY PANEL [30922 CPT(R)] TSH 3RD GENERATION [93757 CPT(R)] Follow-up Instructions Return in about 3 months (around 11/9/2018). Introducing hospitals & HEALTH SERVICES! Dear Dimple Arevalo: 
Thank you for requesting a Souktel account. Our records indicate that you already have an active Souktel account. You can access your account anytime at [email or web address] Did you know that you can access your hospital and ER discharge instructions at any time in Souktel? You can also review all of your test results from your hospital stay or ER visit. Additional Information If you have questions, please visit the Frequently Asked Questions section of the Souktel website at [email or web address] Remember, Souktel is NOT to be used for urgent needs. For medical emergencies, dial 911. Now available from your iPhone and Android! Please provide this summary of care documentation to your next provider. Your primary care clinician is listed as Fina Kunz.  If you have any questions after today's visit, please call [phone].

## 2018-08-09 NOTE — LETTER
8/9/2018 10:33 AM 
 
Patient:  Jonnathan Ariza YOB: 1990 Date of Visit: 8/9/2018 Dear Ludy Alegria MD 
[address] [place] [address] [address] [address] VIA In Basket 
 : Thank you for referring Ms. Kelley Givens to me for evaluation/treatment. Below are the relevant portions of my assessment and plan of care. If you have questions, please do not hesitate to call me. I look forward to following Ms. Christian Sarmiento along with you. Sincerely, Jordan Mcintosh MD

## 2018-08-09 NOTE — PROGRESS NOTES
Chief Complaint   Patient presents with    Thyroid Problem     pcp and pharmacy verified   Records reviewed  History of Present Illness: Iva Mathew is a 32 y.o. female who I was asked to see in consult by Dr. Maddie Sotelo for evaluation of hypothyroidism. In December 2017 she presented to Dr. Jeaneth Kennedy with issues of fatigue, daytime sleepiness, joint aching. She had a TSH of 5.01, so she was diagnosed with hypothyroidism. She was started on LT4 25mcg daily, but she began to have insomnia and anxiety so she was changed to Kubi Mobi, which she notes she has been doing ok with. In June 2018 her TSH was 9.73. She was continued on the Levoxyl 25mcg. It was after the June TSH that she was changed to branded Levoxyl 25mcg daily. She notes that with the change to Levoxyl she notes the anxiety and insomnia have resolved, but she continues to have fatigue and daytime sleepiness. She also notes that \"my heart will start to pound when I do house work\". Pt has cholecystectomy in 2009 and she notes she will get occasional diarrhea, for which she is on cholestyramine. She saw Dr. Fall in January 2018 for EGD and coloscopy for her issues of diarrhea and GERD. The findings were unremarkable. She denies issues of dysphagia, dysphonia or chocking issues. Her LMP was 7/16/18, she notes it is regular in timing and duration. She has two MAunts with thyroid issues (one hypothyroidism and one with thyroid cancer). Past Medical History:   Diagnosis Date    Asthma     exercise / cold induced doesn't use inhaler    Hypothyroid     Interstitial cystitis      Past Surgical History:   Procedure Laterality Date    COLONOSCOPY N/A 1/18/2018    COLONOSCOPY performed by Marisol Martin MD at [facility] AMBULATORY OR    HX CHOLECYSTECTOMY       Current Outpatient Prescriptions   Medication Sig    levothyroxine (LEVOXYL) 25 mcg tablet Take 1 Tab by mouth Daily (before breakfast).     hydroCHLOROthiazide (HYDRODIURIL) 25 mg tablet Take 1 Tab by mouth daily. Indications: Edema, hypertension    Cholecalciferol, Vitamin D3, (VITAMIN D3) 2,000 unit cap capsule Take 2,000 Units by mouth daily. Indications: VITAMIN D DEFICIENCY (HIGH DOSE THERAPY) (Patient taking differently: Take 4,000 Units by mouth daily. Indications: VITAMIN D DEFICIENCY (HIGH DOSE THERAPY))    cholestyramine-sucrose (QUESTRAN) 4 gram powder Take  by mouth daily.  albuterol (PROVENTIL HFA, VENTOLIN HFA, PROAIR HFA) 90 mcg/actuation inhaler Take 2 Puffs by inhalation every four (4) hours as needed for Wheezing.  cetirizine (ZYRTEC) 10 mg tablet Take 1 Tab by mouth daily. No current facility-administered medications for this visit. Allergies   Allergen Reactions    Shellfish Derived Swelling     Wheezing    Banana Other (comments)     Abdominal pain      Keflex [Cephalexin] Rash     Family History   Problem Relation Age of Onset    Hypertension Mother     Diabetes Mother     Heart Disease Mother      Arrythmia    Diabetes Father     Hypertension Father     Diabetes Sister      Borderline    Cancer Brother      Brain Tumor    Other Maternal Grandmother      during childbirth    No Known Problems Maternal Grandfather     Heart Failure Paternal Grandmother     Diabetes Paternal Grandmother     Hypertension Paternal Grandmother     No Known Problems Paternal Grandfather     Thyroid Disease Maternal Aunt     No Known Problems Sister     Other Brother      eye issues    Thyroid Cancer Maternal Aunt     Cancer Maternal Aunt      Thyroid     Social History     Social History    Marital status: SINGLE     Spouse name: N/A    Number of children: N/A    Years of education: N/A     Occupational History    Not on file.      Social History Main Topics    Smoking status: Never Smoker    Smokeless tobacco: Never Used    Alcohol use No    Drug use: No    Sexual activity: Yes     Partners: Male     Birth control/ protection: IUD     Other Topics Concern    Not on file     Social History Narrative     Review of Systems:  - Negative except as noted in HPI    Physical Examination:  Blood pressure 131/80, pulse 83, height 5' 2\" (1.575 m), weight 238 lb 6.4 oz (108.1 kg). - General: pleasant, no distress, good eye contact  - HEENT: no exopthalmos, no periorbital edema, no scleral/conjunctival injection, EOMI, no lid lag or stare  - Neck: supple, no thyromegaly, masses, lymph nodes, or carotid bruits, no supraclavicular or dorsocervical fat pads  - Cardiovascular: regular, normal rate, normal S1 and S2, no murmurs/rubs/gallops   - Respiratory: clear to auscultation bilaterally  - Gastrointestinal: soft, nontender, nondistended, no masses, no hepatosplenomegaly  - Musculoskeletal: no proximal muscle weakness in upper or lower extremities  - Integumentary: no acanthosis nigricans, no abdominal striae, no rashes, no edema  - Neurological: reflexes 2+ at biceps, no tremor  - Psychiatric: normal mood and affect    Data Reviewed:   Component      Latest Ref Rng & Units 6/7/2018 6/7/2018 12/29/2017 12/29/2017          10:21 AM 10:21 AM  8:44 AM  8:44 AM   Glucose      65 - 99 mg/dL 94      BUN      6 - 20 mg/dL 9      Creatinine      0.57 - 1.00 mg/dL 0.65      GFR est non-AA      >59 mL/min/1.73 120      GFR est AA      >59 mL/min/1.73 139      BUN/Creatinine ratio      9 - 23 14      Sodium      134 - 144 mmol/L 138      Potassium      3.5 - 5.2 mmol/L 4.2      Chloride      96 - 106 mmol/L 100      CO2      18 - 29 mmol/L 23      Calcium      8.7 - 10.2 mg/dL 9.6      Protein, total      6.0 - 8.5 g/dL 7.2      Albumin      3.5 - 5.5 g/dL 4.4      GLOBULIN, TOTAL      1.5 - 4.5 g/dL 2.8      A-G Ratio      1.2 - 2.2 1.6      Bilirubin, total      0.0 - 1.2 mg/dL 0.2      Alk.  phosphatase      39 - 117 IU/L 87      AST      0 - 40 IU/L 17      ALT (SGPT)      0 - 32 IU/L 15      Hemoglobin A1c, (calculated)      4.8 - 5.6 %    5.5   Estimated average glucose mg/dL    111   Vitamin B12      232 - 1245 pg/mL   821    Folate      >3.0 ng/mL   15.1    TSH      0.450 - 4.500 uIU/mL  9.730 (H)     VITAMIN D, 25-HYDROXY      30.0 - 100.0 ng/mL         Component      Latest Ref Rng & Units 12/29/2017 12/29/2017           8:44 AM  8:44 AM   Glucose      65 - 99 mg/dL     BUN      6 - 20 mg/dL     Creatinine      0.57 - 1.00 mg/dL     GFR est non-AA      >59 mL/min/1.73     GFR est AA      >59 mL/min/1.73     BUN/Creatinine ratio      9 - 23     Sodium      134 - 144 mmol/L     Potassium      3.5 - 5.2 mmol/L     Chloride      96 - 106 mmol/L     CO2      18 - 29 mmol/L     Calcium      8.7 - 10.2 mg/dL     Protein, total      6.0 - 8.5 g/dL     Albumin      3.5 - 5.5 g/dL     GLOBULIN, TOTAL      1.5 - 4.5 g/dL     A-G Ratio      1.2 - 2.2     Bilirubin, total      0.0 - 1.2 mg/dL     Alk. phosphatase      39 - 117 IU/L     AST      0 - 40 IU/L     ALT (SGPT)      0 - 32 IU/L     Hemoglobin A1c, (calculated)      4.8 - 5.6 %     Estimated average glucose      mg/dL     Vitamin B12      232 - 1245 pg/mL     Folate      >3.0 ng/mL     TSH      0.450 - 4.500 uIU/mL  5.010 (H)   VITAMIN D, 25-HYDROXY      30.0 - 100.0 ng/mL 32.5        Assessment/Plan:   1. Acquired hypothyroidism    1) Pt has symptoms that are consistent with hypothyroidism. Will check TSH, FT4, and Thyroid Ab panel. Since pt is a woman of childbearing age, we will shoot for a goal TSH of 2.5 or less. We discussed various thyroid preparations and recommended that we stick with branded Levoxyl since she is tolerating it well and since she had a bad reaction to generic levothyroxine I want to work on normalizing her TFTs on the branded Levoyl first.    Pt voices understanding and agreement with the plan. RTC 3 months    Follow-up Disposition:  Return in about 3 months (around 11/9/2018).     Copy sent to:  Dr. Ike Ellison

## 2018-08-11 LAB
T4 FREE SERPL-MCNC: 1.05 NG/DL (ref 0.82–1.77)
THYROGLOB AB SERPL-ACNC: 1.4 IU/ML (ref 0–0.9)
THYROPEROXIDASE AB SERPL-ACNC: 13 IU/ML (ref 0–34)
TSH SERPL DL<=0.005 MIU/L-ACNC: 5.95 UIU/ML (ref 0.45–4.5)

## 2018-08-13 DIAGNOSIS — E03.9 ACQUIRED HYPOTHYROIDISM: Primary | ICD-10-CM

## 2018-08-13 RX ORDER — LEVOTHYROXINE SODIUM 50 UG/1
50 TABLET ORAL
Qty: 30 TAB | Refills: 3 | Status: SHIPPED | OUTPATIENT
Start: 2018-08-13 | End: 2018-11-23 | Stop reason: DRUGHIGH

## 2018-10-30 ENCOUNTER — OFFICE VISIT (OUTPATIENT)
Dept: CARDIOLOGY CLINIC | Age: 28
End: 2018-10-30

## 2018-10-30 VITALS
BODY MASS INDEX: 45.88 KG/M2 | HEIGHT: 62 IN | DIASTOLIC BLOOD PRESSURE: 78 MMHG | OXYGEN SATURATION: 98 % | WEIGHT: 249.3 LBS | RESPIRATION RATE: 18 BRPM | HEART RATE: 94 BPM | SYSTOLIC BLOOD PRESSURE: 116 MMHG

## 2018-10-30 DIAGNOSIS — R00.2 HEART PALPITATIONS: Primary | ICD-10-CM

## 2018-10-30 DIAGNOSIS — R00.2 POUNDING HEARTBEAT: ICD-10-CM

## 2018-10-30 NOTE — PROGRESS NOTES
[address] [place] [address] 60, [place], [address] [facility]     Forrest Weiss is a 25 y.o. female. New to me. Subjective:       Ms. Neli Roca reports onset of palpitations. She is 4 months pregnant currently. She states they feel like a \"pounding in her chest\", with some associated shortness of breath. She also reports one episode of diaphoresis, and some dizziness/muscle fatigue. She notes symptoms occur every day, and can last \"a while\". She has experienced this in the past, but it had improved with thyroid treatment. She is on HCTZ for baseline swelling in her lower extremities. Patient denies any exertional chest pain, syncope, orthopnea, or paroxysmal nocturnal dyspnea.       Patient Active Problem List    Diagnosis Date Noted    Acquired hypothyroidism 08/13/2018    Obesity, morbid ([address] 12/29/2017    Mild intermittent asthma with acute exacerbation 10/24/2017    Environmental and seasonal allergies 10/24/2017    Localized edema 10/24/2017    H/O extrinsic asthma 10/24/2017    Chronic LBP 01/29/2016      Zion Gabriel MD  Past Medical History:   Diagnosis Date    Asthma     exercise / cold induced doesn't use inhaler    Hypothyroid     Interstitial cystitis       Past Surgical History:   Procedure Laterality Date    HX CHOLECYSTECTOMY       Allergies   Allergen Reactions    Shellfish Derived Swelling     Wheezing    Banana Other (comments)     Abdominal pain      Keflex [Cephalexin] Rash      Family History   Problem Relation Age of Onset    Hypertension Mother     Diabetes Mother     Heart Disease Mother         Arrythmia    Diabetes Father     Hypertension Father     Diabetes Sister         Borderline    Cancer Brother         Brain Tumor    Other Maternal Grandmother         during childbirth    No Known Problems Maternal Grandfather     Heart Failure Paternal Grandmother     Diabetes Paternal Grandmother     Hypertension Paternal Grandmother     No Known Problems Paternal Grandfather     Thyroid Disease Maternal Aunt     No Known Problems Sister     Other Brother         eye issues    Thyroid Cancer Maternal Aunt     Cancer Maternal Aunt         Thyroid      Social History     Socioeconomic History    Marital status: SINGLE     Spouse name: Not on file    Number of children: Not on file    Years of education: Not on file    Highest education level: Not on file   Social Needs    Financial resource strain: Not on file    Food insecurity - worry: Not on file    Food insecurity - inability: Not on file   [place] needs - medical: Not on file   [place] needs - non-medical: Not on file   Occupational History    Not on file   Tobacco Use    Smoking status: Never Smoker    Smokeless tobacco: Never Used   Substance and Sexual Activity    Alcohol use: No    Drug use: No    Sexual activity: Yes     Partners: Male     Birth control/protection: IUD   Other Topics Concern    Not on file   Social History Narrative    Not on file      Current Outpatient Medications   Medication Sig    PNV No12-Iron-FA-DSS-OM-3 29 mg iron-1 mg -50 mg CPKD Take  by mouth daily.  levothyroxine (SYNTHROID) 50 mcg tablet Take 1 Tab by mouth Daily (before breakfast). Branded Levoxyl medically needed.  Cholecalciferol, Vitamin D3, (VITAMIN D3) 2,000 unit cap capsule Take 2,000 Units by mouth daily. Indications: VITAMIN D DEFICIENCY (HIGH DOSE THERAPY) (Patient taking differently: Take 4,000 Units by mouth daily. Indications: VITAMIN D DEFICIENCY (HIGH DOSE THERAPY))    cholestyramine-sucrose (QUESTRAN) 4 gram powder Take  by mouth as needed.  albuterol (PROVENTIL HFA, VENTOLIN HFA, PROAIR HFA) 90 mcg/actuation inhaler Take 2 Puffs by inhalation every four (4) hours as needed for Wheezing.  cetirizine (ZYRTEC) 10 mg tablet Take 1 Tab by mouth daily.  (Patient taking differently: Take 10 mg by mouth daily as needed.)    hydroCHLOROthiazide (HYDRODIURIL) 25 mg tablet Take 1 Tab by mouth daily. Indications: Edema, hypertension     No current facility-administered medications for this visit. Review of Systems  Constitutional: Negative for fever, chills, malaise/fatigue and diaphoresis. Respiratory: Negative for cough, hemoptysis, sputum production, shortness of breath and wheezing. Cardiovascular: Negative for chest pain, palpitations, orthopnea, claudication, leg swelling and PND. Gastrointestinal: Negative for heartburn, nausea, vomiting, blood in stool and melena. Genitourinary: Negative for dysuria and flank pain. Musculoskeletal: Negative for joint pain and back pain. Skin: Negative for rash. Neurological: Negative for focal weakness, seizures, loss of consciousness, weakness and headaches. Endo/Heme/Allergies: Does not bruise/bleed easily. Psychiatric/Behavioral: Negative for memory loss. The patient does not have insomnia. Physical Exam:    Visit Vitals  /78 (BP 1 Location: Left arm, BP Patient Position: Sitting)   Pulse 94   Resp 18   Ht 5' 2\" (1.575 m)   Wt 249 lb 4.8 oz (113.1 kg)   SpO2 98%   BMI 45.60 kg/m²     Wt Readings from Last 3 Encounters:   10/30/18 249 lb 4.8 oz (113.1 kg)   08/09/18 238 lb 6.4 oz (108.1 kg)   06/11/18 241 lb (109.3 kg)       Gen: NAD    Mental Status - Alert. General Appearance - Not in acute distress. Neck - no JVD    Chest and Lung Exam   Inspection: Accessory muscles - No use of accessory muscles in breathing. Auscultation:   Breath sounds: - Normal.     Cardiovascular   Inspection: Jugular vein - Bilateral - Inspection Normal.   Palpation/Percussion:   Apical Impulse: - Normal.   Auscultation: Rhythm - Regular. Heart Sounds - S1 WNL and S2 WNL. No S3 or S4. Murmurs & Other Heart Sounds: Auscultation of the heart reveals - No Murmurs. Peripheral Vascular   Upper Extremity: Inspection - Bilateral - No Cyanotic nailbeds or Digital clubbing.    Lower Extremity:   Palpation: Edema - Bilateral - No edema. Abdomen: Soft, non-tender, bowel sounds are active. Neuro: A&O times 3, CN and motor grossly WNL    Cardiographics  EKG 10/30/18 - SR, within normal limits. Assessment:     Encounter Diagnoses   Name Primary?  Pounding heartbeat     Heart palpitations Yes      Plan:     Ms. Shelley Beltre reports palpitations characterized by a a \"forceful pounding\". She has hypothyroidism and is 4 months pregnant. I will order an echo to assess LV function. I will also order a holter monitor to assess her palpitations. EKG today is good. BP is within normal limits. Follow up after testing.     Written by Jessica Reddy, as dictated by Basilia David MD.

## 2018-10-30 NOTE — PROGRESS NOTES
1. Have you been to the ER, urgent care clinic since your last visit? Hospitalized since your last visit? NO.      2. Have you seen or consulted any other health care providers outside of the [address] [place] [address] since your last visit? Include any pap smears or colon screening. -OB-GYN.       Chief Complaint   Patient presents with    New Patient     4 MONTHS PREGNANT-HEART RACING, POUNDING, MUSCLE FATIGUE, SOBOE

## 2018-11-01 ENCOUNTER — CLINICAL SUPPORT (OUTPATIENT)
Dept: CARDIOLOGY CLINIC | Age: 28
End: 2018-11-01

## 2018-11-01 DIAGNOSIS — R00.2 HEART PALPITATIONS: ICD-10-CM

## 2018-11-01 DIAGNOSIS — R00.2 POUNDING HEARTBEAT: ICD-10-CM

## 2018-11-05 DIAGNOSIS — E03.9 ACQUIRED HYPOTHYROIDISM: ICD-10-CM

## 2018-11-14 NOTE — PROGRESS NOTES
Spoke with patient  Verified patient with 2 patient identifiers  Informed per Dr Rick Howell ECHO is normal  Patient verbalized understanding.

## 2018-11-14 NOTE — PROGRESS NOTES
Spoke with patient  Verified patient with 2 patient identifiers  Informed per Dr Ty Ramirez cardiac holter monitor results show rare extra beats; nothing to be concerned about. No treatment necessary  Patient verbalized understanding.

## 2018-11-19 ENCOUNTER — OFFICE VISIT (OUTPATIENT)
Dept: ENDOCRINOLOGY | Age: 28
End: 2018-11-19

## 2018-11-19 VITALS — HEIGHT: 62 IN | WEIGHT: 251.6 LBS | BODY MASS INDEX: 46.3 KG/M2

## 2018-11-19 DIAGNOSIS — E03.9 ACQUIRED HYPOTHYROIDISM: Primary | ICD-10-CM

## 2018-11-19 NOTE — PROGRESS NOTES
Chief Complaint   Patient presents with    Thyroid Problem     pcp and pharmacy verified. Is 25 weeksn pregnant. OB edelmira blood this am. OB is Dr. Maru Brothers at [facility]. Records since last visit reviewed. History of Present Illness: Tressa Lemus is a 25 y.o. female here for follow up of hypothyroidism. In December 2017 she presented to Dr. Keren Roberts with issues of fatigue, daytime sleepiness, joint aching. She had a TSH of 5.01, so she was diagnosed with hypothyroidism. She was started on LT4 25mcg daily, but she began to have insomnia and anxiety so she was changed to [employer], which she notes she has been doing ok with. In June 2018 her TSH was 9.73. She was continued on the Levoxyl 25mcg. It was after the June TSH that she was changed to branded Levoxyl 25mcg daily. At our initial visit in August 2018 her TSH was 5.95 with FT4 of 1.05 on Levoxyl 25mcg daily. I increased her dose to 50mcg daily. Today pt is 18 weeks pregnant. Her LEXY is 4/22/18. She is still taking Levoxyl 50mcg daily. She notes that her OB Dr. Maru Brothers at [facility] edelmira thyroid labs today (will request these results). She notes that she will still get palpitations \"When I do minor things\". She saw cardiology, and had a holter monitor \"which showed a rare extra heart beat\", but the ECHO was normal.  Pt notes that she continues to have issues of extreme fatigue and \"I am always thirsty. \"    Current Outpatient Medications   Medication Sig    PNV No12-Iron-FA-DSS-OM-3 29 mg iron-1 mg -50 mg CPKD Take  by mouth daily.  levothyroxine (SYNTHROID) 50 mcg tablet Take 1 Tab by mouth Daily (before breakfast). Branded Levoxyl medically needed.  Cholecalciferol, Vitamin D3, (VITAMIN D3) 2,000 unit cap capsule Take 2,000 Units by mouth daily. Indications: VITAMIN D DEFICIENCY (HIGH DOSE THERAPY) (Patient taking differently: Take 4,000 Units by mouth daily.  Indications: VITAMIN D DEFICIENCY (HIGH DOSE THERAPY))  cholestyramine-sucrose (QUESTRAN) 4 gram powder Take  by mouth as needed.  albuterol (PROVENTIL HFA, VENTOLIN HFA, PROAIR HFA) 90 mcg/actuation inhaler Take 2 Puffs by inhalation every four (4) hours as needed for Wheezing.  cetirizine (ZYRTEC) 10 mg tablet Take 1 Tab by mouth daily. (Patient taking differently: Take 10 mg by mouth daily as needed.)    hydroCHLOROthiazide (HYDRODIURIL) 25 mg tablet Take 1 Tab by mouth daily. Indications: Edema, hypertension     No current facility-administered medications for this visit. Allergies   Allergen Reactions    Shellfish Derived Swelling     Wheezing    Banana Other (comments)     Abdominal pain      Keflex [Cephalexin] Rash     Review of Systems:  - Cardiovascular: no chest pain  - Neurological: no tremors  - Integumentary: skin is normal    Physical Examination:  Height 5' 2\" (1.575 m), weight 251 lb 9.6 oz (114.1 kg). - General: pleasant, no distress, good eye contact   - Neck: no thyromegaly or thyroid bruits  - Cardiovascular: regular, normal rate, nl s1 and s2, no m/r/g   - Integumentary: skin is normal, no edema  - Neurological: reflexes 2+ at biceps, no tremors  - Psychiatric: normal mood and affect    Data Reviewed:   - none new for review    Assessment/Plan:   1) Hypothyroidism > Pt continues to have the issues of fatigue, palpitations and insomnia. Her OB edelmira labs today, will request these results. She is 18 weeks pregnant so her goal TSH is less than 2.5. If her TFTs look good, we can investigate other causes for her fatigue. Pt voices understanding and agreement with the plan. RTC 6 weeks      Follow-up Disposition:  Return in about 6 weeks (around 12/31/2018).     Copy sent to:  Dr. Juju Reddy

## 2018-11-23 ENCOUNTER — TELEPHONE (OUTPATIENT)
Dept: ENDOCRINOLOGY | Age: 28
End: 2018-11-23

## 2018-11-23 DIAGNOSIS — O99.282 HYPOTHYROIDISM IN PREGNANCY, ANTEPARTUM, SECOND TRIMESTER: Primary | ICD-10-CM

## 2018-11-23 DIAGNOSIS — E03.9 HYPOTHYROIDISM IN PREGNANCY, ANTEPARTUM, SECOND TRIMESTER: Primary | ICD-10-CM

## 2018-11-23 RX ORDER — LEVOTHYROXINE SODIUM 88 UG/1
88 TABLET ORAL
Qty: 30 TAB | Refills: 3 | Status: SHIPPED | OUTPATIENT
Start: 2018-11-23 | End: 2018-12-24 | Stop reason: SDUPTHER

## 2018-11-23 NOTE — TELEPHONE ENCOUNTER
Received TFTs from Dr. Kallie Alvarez. Her TSH was 4.26 with FT4 of 1.12. I called pt with the results and explained that we need to increase her dose of LT4, Will increase her dose from 50mcg daily to 88mcg daily. Will repeat her TFTs in 4 weeks. Pt voices understanding and agreement with the plan.

## 2018-11-28 NOTE — DISCHARGE INSTRUCTIONS
Bronchitis: Care Instructions  Your Care Instructions    Bronchitis is inflammation of the bronchial tubes, which carry air to the lungs. The tubes swell and produce mucus, or phlegm. The mucus and inflamed bronchial tubes make you cough. You may have trouble breathing. Most cases of bronchitis are caused by viruses like those that cause colds. Antibiotics usually do not help and they may be harmful. Bronchitis usually develops rapidly and lasts about 2 to 3 weeks in otherwise healthy people. Follow-up care is a key part of your treatment and safety. Be sure to make and go to all appointments, and call your doctor if you are having problems. It's also a good idea to know your test results and keep a list of the medicines you take. How can you care for yourself at home? · Take all medicines exactly as prescribed. Call your doctor if you think you are having a problem with your medicine. · Get some extra rest.  · Take an over-the-counter pain medicine, such as acetaminophen (Tylenol), ibuprofen (Advil, Motrin), or naproxen (Aleve) to reduce fever and relieve body aches. Read and follow all instructions on the label. · Do not take two or more pain medicines at the same time unless the doctor told you to. Many pain medicines have acetaminophen, which is Tylenol. Too much acetaminophen (Tylenol) can be harmful. · Take an over-the-counter cough medicine that contains dextromethorphan to help quiet a dry, hacking cough so that you can sleep. Avoid cough medicines that have more than one active ingredient. Read and follow all instructions on the label. · Breathe moist air from a humidifier, hot shower, or sink filled with hot water. The heat and moisture will thin mucus so you can cough it out. · Do not smoke. Smoking can make bronchitis worse. If you need help quitting, talk to your doctor about stop-smoking programs and medicines. These can increase your chances of quitting for good.   When should you call for help? Call 911 anytime you think you may need emergency care. For example, call if:  · You have severe trouble breathing. Call your doctor now or seek immediate medical care if:  · You have new or worse trouble breathing. · You cough up dark brown or bloody mucus (sputum). · You have a new or higher fever. · You have a new rash. Watch closely for changes in your health, and be sure to contact your doctor if:  · You cough more deeply or more often, especially if you notice more mucus or a change in the color of your mucus. · You are not getting better as expected. Where can you learn more? Go to http://jamin-neisha.info/. Enter H333 in the search box to learn more about \"Bronchitis: Care Instructions. \"  Current as of: March 25, 2017  Content Version: 11.3  © 3226-6436 [employer]. Care instructions adapted under license by [employer] (which disclaims liability or warranty for this information). If you have questions about a medical condition or this instruction, always ask your healthcare professional. [address] any warranty or liability for your use of this information. shannan

## 2018-12-21 DIAGNOSIS — O99.282 HYPOTHYROIDISM IN PREGNANCY, ANTEPARTUM, SECOND TRIMESTER: ICD-10-CM

## 2018-12-21 DIAGNOSIS — E03.9 HYPOTHYROIDISM IN PREGNANCY, ANTEPARTUM, SECOND TRIMESTER: ICD-10-CM

## 2018-12-26 RX ORDER — LEVOTHYROXINE SODIUM 88 UG/1
TABLET ORAL
Qty: 30 TAB | Refills: 0 | Status: SHIPPED | OUTPATIENT
Start: 2018-12-26 | End: 2019-01-11

## 2019-01-10 ENCOUNTER — OFFICE VISIT (OUTPATIENT)
Dept: ENDOCRINOLOGY | Age: 29
End: 2019-01-10

## 2019-01-10 VITALS
BODY MASS INDEX: 48.32 KG/M2 | HEIGHT: 62 IN | DIASTOLIC BLOOD PRESSURE: 73 MMHG | WEIGHT: 262.6 LBS | SYSTOLIC BLOOD PRESSURE: 124 MMHG | HEART RATE: 100 BPM

## 2019-01-10 DIAGNOSIS — O99.283 HYPOTHYROIDISM IN PREGNANCY, ANTEPARTUM, THIRD TRIMESTER: Primary | ICD-10-CM

## 2019-01-10 DIAGNOSIS — E03.9 HYPOTHYROIDISM IN PREGNANCY, ANTEPARTUM, THIRD TRIMESTER: Primary | ICD-10-CM

## 2019-01-10 NOTE — PROGRESS NOTES
Chief Complaint   Patient presents with    Thyroid Problem     pcp and pharmacy verified   Records since last visit reviewed. History of Present Illness: Anthony Taylor is a 25 y.o. female here for follow up of hypothyroidism. At our last visit in November 2018 she was 18 weeks into her pregnancy and was on Levoxyl 50mcg daily. Received TFTs from Dr. Jimy Love. Her TSH was 4.26 with FT4 of 1.12.  Pt's Levoxyl was increased to 88mcg daily. \"Everything has been going pretty ok. I have some days when I have no energy and don't want to get out of bed and I have been having some issues of muscle fatigue. I have also been having issues of insomnia, I can't seem to fall asleep till about 3AM\". Pt is taking the Levoxyl 88mcg daily. Today pt is 25 weeks pregnant. Her LEXY is 4/22/18. She has not had thyroid labs drawn recently. She notes that the palpitations have improved. She saw cardiology, and had a holter monitor \"which showed a rare extra heart beat\", but the ECHO was normal.      Current Outpatient Medications   Medication Sig    LEVOXYL 88 mcg tablet TAKE 1 TABLET BY MOUTH EVERY DAY BEFORE BREAKFAST    PNV No12-Iron-FA-DSS-OM-3 29 mg iron-1 mg -50 mg CPKD Take  by mouth daily.  cholestyramine-sucrose (QUESTRAN) 4 gram powder Take  by mouth as needed.  albuterol (PROVENTIL HFA, VENTOLIN HFA, PROAIR HFA) 90 mcg/actuation inhaler Take 2 Puffs by inhalation every four (4) hours as needed for Wheezing.  cetirizine (ZYRTEC) 10 mg tablet Take 1 Tab by mouth daily. (Patient taking differently: Take 10 mg by mouth daily as needed.)    Cholecalciferol, Vitamin D3, (VITAMIN D3) 2,000 unit cap capsule Take 2,000 Units by mouth daily. Indications: VITAMIN D DEFICIENCY (HIGH DOSE THERAPY) (Patient taking differently: Take 4,000 Units by mouth daily. Indications: VITAMIN D DEFICIENCY (HIGH DOSE THERAPY))     No current facility-administered medications for this visit.       Allergies   Allergen Reactions    Shellfish Derived Swelling     Wheezing    Banana Other (comments)     Abdominal pain      Keflex [Cephalexin] Rash     Review of Systems:  - Cardiovascular: no chest pain  - Neurological: no tremors  - Integumentary: skin is normal    Physical Examination:  Blood pressure 124/73, pulse 100, height 5' 2\" (1.575 m), weight 262 lb 9.6 oz (119.1 kg). - General: pleasant, no distress, good eye contact   - Neck: no thyromegaly or thyroid bruits  - Cardiovascular: regular, normal rate, nl s1 and s2, no m/r/g   - Integumentary: skin is normal, 2+ edema  - Neurological: reflexes 2+ at biceps, no tremors  - Psychiatric: normal mood and affect    Data Reviewed:   - none new for review    Assessment/Plan:   1) Hypothyroidism > Pt continues to have the issues of fatigue, and increased thirst. She is 25 weeks pregnant so her goal TSH is less than 3.0. Will check her TFTs today and adjust her Levoxyl as needed. She is going for her OGTT in 2 weeks. Pt voices understanding and agreement with the plan. RTC 6 weeks      Follow-up Disposition:  Return in about 6 weeks (around 2/21/2019).     Copy sent to:  Dr. Isabel Garner

## 2019-01-11 LAB
FT4I SERPL CALC-MCNC: 1.1 (ref 1.2–4.9)
T3RU NFR SERPL: 10 % (ref 24–39)
T4 SERPL-MCNC: 10.6 UG/DL (ref 4.5–12)
TSH SERPL DL<=0.005 MIU/L-ACNC: 3.13 UIU/ML (ref 0.45–4.5)

## 2019-01-11 RX ORDER — LEVOTHYROXINE SODIUM 112 UG/1
112 TABLET ORAL
Qty: 30 TAB | Refills: 3 | Status: SHIPPED | OUTPATIENT
Start: 2019-01-11 | End: 2019-05-19 | Stop reason: SDUPTHER

## 2019-02-14 DIAGNOSIS — O99.283 HYPOTHYROIDISM IN PREGNANCY, ANTEPARTUM, THIRD TRIMESTER: ICD-10-CM

## 2019-02-14 DIAGNOSIS — E03.9 HYPOTHYROIDISM IN PREGNANCY, ANTEPARTUM, THIRD TRIMESTER: ICD-10-CM

## 2019-02-21 ENCOUNTER — OFFICE VISIT (OUTPATIENT)
Dept: ENDOCRINOLOGY | Age: 29
End: 2019-02-21

## 2019-02-21 VITALS
BODY MASS INDEX: 48.76 KG/M2 | SYSTOLIC BLOOD PRESSURE: 121 MMHG | HEIGHT: 62 IN | HEART RATE: 101 BPM | WEIGHT: 265 LBS | DIASTOLIC BLOOD PRESSURE: 78 MMHG

## 2019-02-21 DIAGNOSIS — E03.9 HYPOTHYROIDISM IN PREGNANCY, ANTEPARTUM, THIRD TRIMESTER: Primary | ICD-10-CM

## 2019-02-21 DIAGNOSIS — O99.283 HYPOTHYROIDISM IN PREGNANCY, ANTEPARTUM, THIRD TRIMESTER: Primary | ICD-10-CM

## 2019-02-21 NOTE — PROGRESS NOTES
Chief Complaint   Patient presents with    Thyroid Problem     31 weeks pregnant. Send reports to Dr. Mayco Ellington, OB. Records since last visit reviewed. History of Present Illness: Brian Duarte is a 25 y.o. female here for follow up of hypothyroidism. At our last visit in January 2019 she was 25 weeks pregnant. Her LEXY is 4/22/19. Pt notes the muscle pains and palpitations have improved. She notes that for the last two weeks she has been having symptoms of feeling jittery and sweaty. She notes they occur every couple of days. She checked her BG and it was in the 80's. She tried eating food or drinking something with sugar and that did not help with the symptoms. Pt has no known hx of HTN or low BP \"they have been good when I have gone to by OB appointments\". When she went for the OGTT the tech refused to draw her blood, because she drank the gucola in the car on the way. She is going for a repeat OGTT this week. \"The cardiologist said I had an extra heart beat, but that it was nothing to worry about (PVC?). Pt is still taking the Levoxyl 112mcg daily. Today pt is 31 weeks pregnant. Her LEXY is 4/22/18. She denies issues of CP, SOB, diarrhea, constipation, she notes occasional tremors, when she has \"the spells\". Current Outpatient Medications   Medication Sig    levothyroxine (SYNTHROID) 112 mcg tablet Take 1 Tab by mouth Daily (before breakfast). Brand levoxyl medically needed.  PNV No12-Iron-FA-DSS-OM-3 29 mg iron-1 mg -50 mg CPKD Take  by mouth daily.  cholestyramine-sucrose (QUESTRAN) 4 gram powder Take  by mouth as needed.  albuterol (PROVENTIL HFA, VENTOLIN HFA, PROAIR HFA) 90 mcg/actuation inhaler Take 2 Puffs by inhalation every four (4) hours as needed for Wheezing.  cetirizine (ZYRTEC) 10 mg tablet Take 1 Tab by mouth daily.  (Patient taking differently: Take 10 mg by mouth daily as needed.)    Cholecalciferol, Vitamin D3, (VITAMIN D3) 2,000 unit cap capsule Take 2,000 Units by mouth daily. Indications: VITAMIN D DEFICIENCY (HIGH DOSE THERAPY) (Patient taking differently: Take 4,000 Units by mouth daily. Indications: VITAMIN D DEFICIENCY (HIGH DOSE THERAPY))     No current facility-administered medications for this visit. Allergies   Allergen Reactions    Shellfish Derived Swelling     Wheezing    Banana Other (comments)     Abdominal pain      Keflex [Cephalexin] Rash     Review of Systems:  - Cardiovascular: no chest pain  - Neurological: no tremors  - Integumentary: skin is normal    Physical Examination:  Blood pressure 121/78, pulse (!) 101, height 5' 2\" (1.575 m), weight 265 lb (120.2 kg), last menstrual period 05/16/2018.  - General: pleasant, no distress, good eye contact   - Neck: no thyromegaly or thyroid bruits  - Cardiovascular: regular, normal rate, nl s1 and s2, no m/r/g   - Integumentary: skin is normal, 2+ edema  - Neurological: reflexes 2+ at biceps, no tremors  - Psychiatric: normal mood and affect    Data Reviewed:   - none new for review    Assessment/Plan:   1) Hypothyroidism > Pt continues to have the issues of fatigue due to her pregnancy. She is 31 weeks pregnant so her goal TSH is less than 3.0. Will check her TFTs today and adjust her Levoxyl as needed. Pt voices understanding and agreement with the plan. RTC 3 months, which will be 4-6 weeks after delivery      Follow-up Disposition:  Return in about 3 months (around 5/21/2019).     Copy sent to:  Dr. Emily Pierce

## 2019-02-22 LAB
FT4I SERPL CALC-MCNC: 1.3 (ref 1.2–4.9)
T3RU NFR SERPL: 11 % (ref 24–39)
T4 SERPL-MCNC: 11.6 UG/DL (ref 4.5–12)
TSH SERPL DL<=0.005 MIU/L-ACNC: 1.05 UIU/ML (ref 0.45–4.5)

## 2019-04-04 ENCOUNTER — IP HISTORICAL/CONVERTED ENCOUNTER (OUTPATIENT)
Dept: OTHER | Age: 29
End: 2019-04-04

## 2019-05-29 ENCOUNTER — OFFICE VISIT (OUTPATIENT)
Dept: ENDOCRINOLOGY | Age: 29
End: 2019-05-29

## 2019-05-29 VITALS
DIASTOLIC BLOOD PRESSURE: 67 MMHG | BODY MASS INDEX: 47.66 KG/M2 | WEIGHT: 259 LBS | OXYGEN SATURATION: 97 % | SYSTOLIC BLOOD PRESSURE: 114 MMHG | HEART RATE: 78 BPM | HEIGHT: 62 IN | RESPIRATION RATE: 16 BRPM

## 2019-05-29 DIAGNOSIS — E03.9 ACQUIRED HYPOTHYROIDISM: Primary | ICD-10-CM

## 2019-05-29 NOTE — PROGRESS NOTES
Chief Complaint   Patient presents with    Thyroid Problem   Records since last visit reviewed. History of Present Illness: Faustino Amor is a 25 y.o. female here for follow up of hypothyroidism. At our last visit in February 2019 she was 31 weeks into her pregnancy. Her LEXY was 4/22/19. Her TSH was 1.05 with TT4 of 11.6 on Levoxyl 112mcg daily. Pt delivered on 4/4/19. She is still taking the Levoxyl 112mcg daily. \"The cardiologist said I had an extra heart beat, but that it was nothing to worry about (PVC?). Pt notes she will still have the palpitations about once per week, but they have decreased significantly. Pt is still taking the Levoxyl 112mcg daily. She denies issues of CP, SOB, she does take cholestyramine for her chronic diarrhea. She denies issues of tremors, she notes \"I am always hot\". Her LMP was 5/12/19. She notes that since delivery her hands and feet are tending to swell. Current Outpatient Medications   Medication Sig    LEVOXYL 112 mcg tablet Take 1 Tab by mouth Daily (before breakfast). Branded Levoxyl medically needed    PNV No12-Iron-FA-DSS-OM-3 29 mg iron-1 mg -50 mg CPKD Take  by mouth daily.  Cholecalciferol, Vitamin D3, (VITAMIN D3) 2,000 unit cap capsule Take 2,000 Units by mouth daily. Indications: VITAMIN D DEFICIENCY (HIGH DOSE THERAPY) (Patient taking differently: Take 4,000 Units by mouth daily. Indications: VITAMIN D DEFICIENCY (HIGH DOSE THERAPY))    cholestyramine-sucrose (QUESTRAN) 4 gram powder Take  by mouth as needed.  albuterol (PROVENTIL HFA, VENTOLIN HFA, PROAIR HFA) 90 mcg/actuation inhaler Take 2 Puffs by inhalation every four (4) hours as needed for Wheezing.  cetirizine (ZYRTEC) 10 mg tablet Take 1 Tab by mouth daily. (Patient taking differently: Take 10 mg by mouth daily as needed.)     No current facility-administered medications for this visit.       Allergies   Allergen Reactions    Shellfish Derived Swelling     Wheezing    Banana Other (comments)     Abdominal pain      Keflex [Cephalexin] Rash     Review of Systems:  - Cardiovascular: no chest pain  - Neurological: no tremors  - Integumentary: skin is normal    Physical Examination:  Blood pressure 114/67, pulse 78, resp. rate 16, height 5' 2\" (1.575 m), weight 259 lb (117.5 kg), SpO2 97 %. - General: pleasant, no distress, good eye contact   - Neck: no thyromegaly or thyroid bruits  - Cardiovascular: regular, normal rate, nl s1 and s2, no m/r/g   - Integumentary: skin is normal, 2+ edema  - Neurological: reflexes 2+ at biceps, no tremors  - Psychiatric: normal mood and affect    Data Reviewed:   - none new for review    Assessment/Plan:   1) Hypothyroidism > Pt is clinically euthyroid on the Levoxyl 112mcg daily. Will check TFTs today and adjust her dose as needed. Pt voices understanding and agreement with the plan. RTC 6 months. Follow-up and Dispositions    · Return in about 6 months (around 11/29/2019).          Copy sent to:  Dr. Kamaljit Bhakta

## 2019-05-30 ENCOUNTER — TELEPHONE (OUTPATIENT)
Dept: FAMILY MEDICINE CLINIC | Age: 29
End: 2019-05-30

## 2019-05-30 LAB
ALBUMIN SERPL-MCNC: 4.1 G/DL (ref 3.5–5.5)
ALBUMIN/GLOB SERPL: 1.6 {RATIO} (ref 1.2–2.2)
ALP SERPL-CCNC: 99 IU/L (ref 39–117)
ALT SERPL-CCNC: 19 IU/L (ref 0–32)
AST SERPL-CCNC: 17 IU/L (ref 0–40)
BILIRUB SERPL-MCNC: 0.3 MG/DL (ref 0–1.2)
BUN SERPL-MCNC: 8 MG/DL (ref 6–20)
BUN/CREAT SERPL: 13 (ref 9–23)
CALCIUM SERPL-MCNC: 8.9 MG/DL (ref 8.7–10.2)
CHLORIDE SERPL-SCNC: 105 MMOL/L (ref 96–106)
CO2 SERPL-SCNC: 22 MMOL/L (ref 20–29)
CREAT SERPL-MCNC: 0.64 MG/DL (ref 0.57–1)
FT4I SERPL CALC-MCNC: 2.2 (ref 1.2–4.9)
GLOBULIN SER CALC-MCNC: 2.6 G/DL (ref 1.5–4.5)
GLUCOSE SERPL-MCNC: 85 MG/DL (ref 65–99)
POTASSIUM SERPL-SCNC: 4 MMOL/L (ref 3.5–5.2)
PROT SERPL-MCNC: 6.7 G/DL (ref 6–8.5)
SODIUM SERPL-SCNC: 139 MMOL/L (ref 134–144)
T3RU NFR SERPL: 27 % (ref 24–39)
T4 FREE SERPL-MCNC: 1.3 NG/DL (ref 0.82–1.77)
T4 SERPL-MCNC: 8.1 UG/DL (ref 4.5–12)
TSH SERPL DL<=0.005 MIU/L-ACNC: 1.56 UIU/ML (ref 0.45–4.5)

## 2019-05-30 NOTE — TELEPHONE ENCOUNTER
----- Message from Mallory Augie sent at 5/30/2019  1:07 PM EDT -----  Regarding: Dr. Luisito Friend: [phone]  No available appointments  ----- Message from [address] [place] [address] 95, Generic sent at 5/30/2019 12:30 PM EDT -----    Appointment Request From: Katya Martinez    With Provider: Carlos Smart MD [facility] [address] [facility] 63.    Preferred Date Range: 6/6/2019 - 6/10/2019    Preferred Times: Any time    Reason for visit: Request an Appointment    Comments:  Medication refills, fluid retention

## 2019-06-27 ENCOUNTER — OFFICE VISIT (OUTPATIENT)
Dept: FAMILY MEDICINE CLINIC | Age: 29
End: 2019-06-27

## 2019-06-27 VITALS
TEMPERATURE: 98.6 F | DIASTOLIC BLOOD PRESSURE: 73 MMHG | WEIGHT: 261 LBS | HEART RATE: 87 BPM | HEIGHT: 62 IN | BODY MASS INDEX: 48.03 KG/M2 | RESPIRATION RATE: 20 BRPM | SYSTOLIC BLOOD PRESSURE: 122 MMHG | OXYGEN SATURATION: 98 %

## 2019-06-27 DIAGNOSIS — E78.5 DYSLIPIDEMIA (HIGH LDL; LOW HDL): Primary | ICD-10-CM

## 2019-06-27 DIAGNOSIS — R73.02 IGT (IMPAIRED GLUCOSE TOLERANCE): ICD-10-CM

## 2019-06-27 DIAGNOSIS — J30.1 SEASONAL ALLERGIC RHINITIS DUE TO POLLEN: ICD-10-CM

## 2019-06-27 DIAGNOSIS — M25.472 ANKLE EDEMA, BILATERAL: ICD-10-CM

## 2019-06-27 DIAGNOSIS — L08.9 INFECTION, SKIN, STAPH: ICD-10-CM

## 2019-06-27 DIAGNOSIS — E66.01 OBESITY, MORBID (HCC): ICD-10-CM

## 2019-06-27 DIAGNOSIS — D75.89 MACROCYTOSIS: ICD-10-CM

## 2019-06-27 DIAGNOSIS — R35.0 FREQUENCY OF URINATION: ICD-10-CM

## 2019-06-27 DIAGNOSIS — E55.9 HYPOVITAMINOSIS D: ICD-10-CM

## 2019-06-27 DIAGNOSIS — B95.8 INFECTION, SKIN, STAPH: ICD-10-CM

## 2019-06-27 DIAGNOSIS — M25.471 ANKLE EDEMA, BILATERAL: ICD-10-CM

## 2019-06-27 RX ORDER — CETIRIZINE HCL 10 MG
10 TABLET ORAL DAILY
Qty: 30 TAB | Refills: 4 | Status: SHIPPED | OUTPATIENT
Start: 2019-06-27 | End: 2019-11-23 | Stop reason: SDUPTHER

## 2019-06-27 RX ORDER — ACETAMINOPHEN 500 MG
2000 TABLET ORAL DAILY
Qty: 90 CAP | Refills: 1 | Status: SHIPPED | OUTPATIENT
Start: 2019-06-27 | End: 2022-06-03 | Stop reason: ALTCHOICE

## 2019-06-27 RX ORDER — MUPIROCIN 20 MG/G
OINTMENT TOPICAL DAILY
Qty: 30 G | Refills: 0 | Status: SHIPPED | OUTPATIENT
Start: 2019-06-27 | End: 2020-07-28

## 2019-06-27 RX ORDER — HYDROCHLOROTHIAZIDE 25 MG/1
25 TABLET ORAL DAILY
Qty: 30 TAB | Refills: 5 | Status: SHIPPED | OUTPATIENT
Start: 2019-06-27 | End: 2020-01-16

## 2019-06-27 NOTE — PATIENT INSTRUCTIONS
Learning About Vitamin D Why is it important to get enough vitamin D? Your body needs vitamin D to absorb calcium. Calcium keeps your bones and muscles, including your heart, healthy and strong. If your muscles don't get enough calcium, they can cramp, hurt, or feel weak. You may have long-term (chronic) muscle aches and pains. If you don't get enough vitamin D throughout life, you have an increased chance of having thin and brittle bones (osteoporosis) in your later years. Children who don't get enough vitamin D may not grow as much as others their age. They also have a chance of getting a rare disease called rickets. It causes weak bones. Vitamin D and calcium are added to many foods. And your body uses sunshine to make its own vitamin D. How much vitamin D do you need? The [place] of Medicine recommends that people ages 3 through 79 get 600 IU (international units) every day. Adults 71 and older need 800 IU every day. Blood tests for vitamin D can check your vitamin D level. But there is no standard normal range used by all laboratories. You're likely getting enough vitamin D if your levels are in the range of 20 to 50 ng/mL. How can you get more vitamin D? Foods that contain vitamin D include: 
· [place], tuna, and mackerel. These are some of the best foods to eat when you need to get more vitamin D. 
· Cheese, egg yolks, and beef liver. These foods have vitamin D in small amounts. · Milk, soy drinks, orange juice, yogurt, margarine, and some kinds of cereal have vitamin D added to them. Some people don't make vitamin D as well as others. They may have to take extra care in getting enough vitamin D. Things that reduce how much vitamin D your body makes include: · Dark skin, such as many  Americans have. · Age, especially if you are older than 72. · Digestive problems, such as Crohn's or celiac disease. · Liver and kidney disease. Some people who do not get enough vitamin D may need supplements. Are there any risks from taking vitamin D? 
· Too much vitamin D: 
? Can damage your kidneys. ? Can cause nausea and vomiting, constipation, and weakness. ? Raises the amount of calcium in your blood. If this happens, you can get confused or have an irregular heart rhythm. · Vitamin D may interact with other medicines. Tell your doctor about all of the medicines you take, including over-the-counter drugs, herbs, and pills. Tell your doctor about all of your current medical problems. Where can you learn more? Go to http://jaminTALON THERAPEUTICSneisha.info/. Enter [id number] in the search box to learn more about \"Learning About Vitamin D.\" 
Current as of: March 28, 2018 Content Version: 11.9 © 0790-1771 [employer]. Care instructions adapted under license by [employer] (which disclaims liability or warranty for this information). If you have questions about a medical condition or this instruction, always ask your healthcare professional. [address] any warranty or liability for your use of this information.

## 2019-06-27 NOTE — PROGRESS NOTES
Chief Complaint   Patient presents with    Medication Refill     HPI:  Cierra Ayers is a 25 y.o.  female with hypothyroidism presents for a long overdue follow up for medication refill. Patient has not been seen in clinic because she was pregnant and had a baby 3 month ago. She is asking for refill on vit D supplement.   .  Review of Systems  As per hpi    Past Medical History:   Diagnosis Date    Asthma     exercise / cold induced doesn't use inhaler    Hypothyroid     Interstitial cystitis      Past Surgical History:   Procedure Laterality Date    COLONOSCOPY N/A 1/18/2018    COLONOSCOPY performed by Florie Lanes, [facility] at [facility]    HX CHOLECYSTECTOMY       Social History     Socioeconomic History    Marital status: SINGLE     Spouse name: Not on file    Number of children: Not on file    Years of education: Not on file    Highest education level: Not on file   Tobacco Use    Smoking status: Never Smoker    Smokeless tobacco: Never Used   Substance and Sexual Activity    Alcohol use: No    Drug use: No    Sexual activity: Yes     Partners: Male     Birth control/protection: IUD     Family History   Problem Relation Age of Onset    Hypertension Mother     Diabetes Mother     Heart Disease Mother         Arrythmia    Diabetes Father     Hypertension Father     Diabetes Sister         Borderline    Cancer Brother         Brain Tumor    Other Maternal Grandmother         during childbirth    No Known Problems Maternal Grandfather     Heart Failure Paternal Grandmother     Diabetes Paternal Grandmother     Hypertension Paternal Grandmother     No Known Problems Paternal Grandfather     Thyroid Disease Maternal Aunt     No Known Problems Sister     Other Brother         eye issues    Thyroid Cancer Maternal Aunt     Cancer Maternal Aunt         Thyroid     Current Outpatient Medications   Medication Sig Dispense Refill    LEVOXYL 112 mcg tablet Take 1 Tab by mouth Daily (before breakfast). Branded Levoxyl medically needed 30 Tab 3    Cholecalciferol, Vitamin D3, (VITAMIN D3) 2,000 unit cap capsule Take 2,000 Units by mouth daily. Indications: VITAMIN D DEFICIENCY (HIGH DOSE THERAPY) (Patient taking differently: Take 4,000 Units by mouth daily. Indications: VITAMIN D DEFICIENCY (HIGH DOSE THERAPY)) 90 Cap 5    albuterol (PROVENTIL HFA, VENTOLIN HFA, PROAIR HFA) 90 mcg/actuation inhaler Take 2 Puffs by inhalation every four (4) hours as needed for Wheezing. 1 Inhaler 0    cetirizine (ZYRTEC) 10 mg tablet Take 1 Tab by mouth daily. (Patient taking differently: Take 10 mg by mouth daily as needed.) 30 Tab 4    PNV No12-Iron-FA-DSS-OM-3 29 mg iron-1 mg -50 mg CPKD Take  by mouth daily.  cholestyramine-sucrose (QUESTRAN) 4 gram powder Take  by mouth as needed. Allergies   Allergen Reactions    Shellfish Derived Swelling     Wheezing    Banana Other (comments)     Abdominal pain      Keflex [Cephalexin] Rash       Objective:  Visit Vitals  /73   Pulse 87   Temp 98.6 °F (37 °C) (Oral)   Resp 20   Ht 5' 2\" (1.575 m)   Wt 261 lb (118.4 kg)   LMP 06/13/2019   SpO2 98%   Breastfeeding?  Unknown   BMI 47.74 kg/m²     Physical Exam:   General appearance -obese,  alert, well appearing in no distress  Mental status - alert, oriented to person, place, and time  EYE-PERRL, EOMI  Chest - clear to auscultation, no wheezes, rales or rhonchi  Heart - normal rate, regular rhythm, normal blood pressure   Abdomen - soft, nontender, nondistended, no organomegaly  Ext-peripheral pulses normal, no pedal edema  Neuro -alert, oriented, normal speech, no focal findings  Back-full range of motion, no tenderness, palpable spasm or pain on motion     Results for orders placed or performed in visit on 05/29/19   THYROID PANEL W/TSH   Result Value Ref Range    TSH 1.560 0.450 - 4.500 uIU/mL    T4, Total 8.1 4.5 - 12.0 ug/dL    T3 Uptake 27 24 - 39 %    Free Thyroxine Index (FTI) 2.2 1.2 - 4.9   T4, FREE   Result Value Ref Range    T4, Free 1.30 0.82 - 5.26 ng/dL   METABOLIC PANEL, COMPREHENSIVE   Result Value Ref Range    Glucose 85 65 - 99 mg/dL    BUN 8 6 - 20 mg/dL    Creatinine 0.64 0.57 - 1.00 mg/dL    GFR est non- >59 mL/min/1.73    GFR est  >59 mL/min/1.73    BUN/Creatinine ratio 13 9 - 23    Sodium 139 134 - 144 mmol/L    Potassium 4.0 3.5 - 5.2 mmol/L    Chloride 105 96 - 106 mmol/L    CO2 22 20 - 29 mmol/L    Calcium 8.9 8.7 - 10.2 mg/dL    Protein, total 6.7 6.0 - 8.5 g/dL    Albumin 4.1 3.5 - 5.5 g/dL    GLOBULIN, TOTAL 2.6 1.5 - 4.5 g/dL    A-G Ratio 1.6 1.2 - 2.2    Bilirubin, total 0.3 0.0 - 1.2 mg/dL    Alk. phosphatase 99 39 - 117 IU/L    AST (SGOT) 17 0 - 40 IU/L    ALT (SGPT) 19 0 - 32 IU/L       Assessment/Plan:  Diagnoses and all orders for this visit:    Dyslipidemia (high LDL; low HDL)  -     LIPID PANEL    Hypovitaminosis D  -     cholecalciferol (VITAMIN D3) 2,000 unit cap capsule; Take 2,000 Units by mouth daily. Indications: Vitamin D Deficiency (High Dose Therapy), Normal, Disp-90 Cap, R-1  -     VITAMIN D, 25 HYDROXY    Ankle edema, bilateral  -     hydroCHLOROthiazide (HYDRODIURIL) 25 mg tablet; Take 1 Tab by mouth daily. Indications: visible water retention, high blood pressure, Normal, Disp-30 Tab, R-5    Seasonal allergic rhinitis due to pollen  -     cetirizine (ZYRTEC) 10 mg tablet; Take 1 Tab by mouth daily. , Normal, Disp-30 Tab, R-4    Obesity, morbid (HCC)  -     METABOLIC PANEL, COMPREHENSIVE    IGT (impaired glucose tolerance)  -     METABOLIC PANEL, COMPREHENSIVE  -     HEMOGLOBIN A1C WITH EAG    Macrocytosis  -     CBC WITH AUTOMATED DIFF    Frequency of urination  -     URINALYSIS W/ RFLX MICROSCOPIC    Infection, skin, staph  -     mupirocin (BACTROBAN) 2 % ointment; Apply  to affected area daily. , Normal, Disp-30 g, R-0      Patient Instructions        Learning About Vitamin D  Why is it important to get enough vitamin D?     Your body needs vitamin D to absorb calcium. Calcium keeps your bones and muscles, including your heart, healthy and strong. If your muscles don't get enough calcium, they can cramp, hurt, or feel weak. You may have long-term (chronic) muscle aches and pains. If you don't get enough vitamin D throughout life, you have an increased chance of having thin and brittle bones (osteoporosis) in your later years. Children who don't get enough vitamin D may not grow as much as others their age. They also have a chance of getting a rare disease called rickets. It causes weak bones. Vitamin D and calcium are added to many foods. And your body uses sunshine to make its own vitamin D. How much vitamin D do you need? The [place] of Medicine recommends that people ages 3 through 79 get 600 IU (international units) every day. Adults 71 and older need 800 IU every day. Blood tests for vitamin D can check your vitamin D level. But there is no standard normal range used by all laboratories. You're likely getting enough vitamin D if your levels are in the range of 20 to 50 ng/mL. How can you get more vitamin D? Foods that contain vitamin D include:  · [place], tuna, and mackerel. These are some of the best foods to eat when you need to get more vitamin D.  · Cheese, egg yolks, and beef liver. These foods have vitamin D in small amounts. · Milk, soy drinks, orange juice, yogurt, margarine, and some kinds of cereal have vitamin D added to them. Some people don't make vitamin D as well as others. They may have to take extra care in getting enough vitamin D. Things that reduce how much vitamin D your body makes include:  · Dark skin, such as many  Americans have. · Age, especially if you are older than 72. · Digestive problems, such as Crohn's or celiac disease. · Liver and kidney disease. Some people who do not get enough vitamin D may need supplements.   Are there any risks from taking vitamin D?  · Too much vitamin D:  ? Can damage your kidneys. ? Can cause nausea and vomiting, constipation, and weakness. ? Raises the amount of calcium in your blood. If this happens, you can get confused or have an irregular heart rhythm. · Vitamin D may interact with other medicines. Tell your doctor about all of the medicines you take, including over-the-counter drugs, herbs, and pills. Tell your doctor about all of your current medical problems. Where can you learn more? Go to http://jamin-neisha.info/. Enter [id number] in the search box to learn more about \"Learning About Vitamin D.\"  Current as of: March 28, 2018  Content Version: 11.9  © 3878-0108 [employer]. Care instructions adapted under license by [employer] (which disclaims liability or warranty for this information). If you have questions about a medical condition or this instruction, always ask your healthcare professional. [place] [address] any warranty or liability for your use of this information. Follow-up and Dispositions    · Return if symptoms worsen or fail to improve, for f/u result.

## 2019-06-28 LAB
25(OH)D3+25(OH)D2 SERPL-MCNC: 31.4 NG/ML (ref 30–100)
ALBUMIN SERPL-MCNC: 4.2 G/DL (ref 3.5–5.5)
ALBUMIN/GLOB SERPL: 1.6 {RATIO} (ref 1.2–2.2)
ALP SERPL-CCNC: 98 IU/L (ref 39–117)
ALT SERPL-CCNC: 22 IU/L (ref 0–32)
APPEARANCE UR: CLEAR
AST SERPL-CCNC: 17 IU/L (ref 0–40)
BASOPHILS # BLD AUTO: 0.1 X10E3/UL (ref 0–0.2)
BASOPHILS NFR BLD AUTO: 1 %
BILIRUB SERPL-MCNC: 0.3 MG/DL (ref 0–1.2)
BILIRUB UR QL STRIP: NEGATIVE
BUN SERPL-MCNC: 9 MG/DL (ref 6–20)
BUN/CREAT SERPL: 14 (ref 9–23)
CALCIUM SERPL-MCNC: 9.4 MG/DL (ref 8.7–10.2)
CHLORIDE SERPL-SCNC: 104 MMOL/L (ref 96–106)
CHOLEST SERPL-MCNC: 161 MG/DL (ref 100–199)
CO2 SERPL-SCNC: 23 MMOL/L (ref 20–29)
COLOR UR: YELLOW
CREAT SERPL-MCNC: 0.64 MG/DL (ref 0.57–1)
EOSINOPHIL # BLD AUTO: 0.2 X10E3/UL (ref 0–0.4)
EOSINOPHIL NFR BLD AUTO: 2 %
ERYTHROCYTE [DISTWIDTH] IN BLOOD BY AUTOMATED COUNT: 15.7 % (ref 12.3–15.4)
EST. AVERAGE GLUCOSE BLD GHB EST-MCNC: 94 MG/DL
GLOBULIN SER CALC-MCNC: 2.6 G/DL (ref 1.5–4.5)
GLUCOSE SERPL-MCNC: 77 MG/DL (ref 65–99)
GLUCOSE UR QL: NEGATIVE
HBA1C MFR BLD: 4.9 % (ref 4.8–5.6)
HCT VFR BLD AUTO: 37.2 % (ref 34–46.6)
HDLC SERPL-MCNC: 43 MG/DL
HGB BLD-MCNC: 11.9 G/DL (ref 11.1–15.9)
HGB UR QL STRIP: NEGATIVE
IMM GRANULOCYTES # BLD AUTO: 0 X10E3/UL (ref 0–0.1)
IMM GRANULOCYTES NFR BLD AUTO: 0 %
KETONES UR QL STRIP: NEGATIVE
LDLC SERPL CALC-MCNC: 88 MG/DL (ref 0–99)
LEUKOCYTE ESTERASE UR QL STRIP: NEGATIVE
LYMPHOCYTES # BLD AUTO: 2.4 X10E3/UL (ref 0.7–3.1)
LYMPHOCYTES NFR BLD AUTO: 29 %
MCH RBC QN AUTO: 27.7 PG (ref 26.6–33)
MCHC RBC AUTO-ENTMCNC: 32 G/DL (ref 31.5–35.7)
MCV RBC AUTO: 87 FL (ref 79–97)
MICRO URNS: NORMAL
MONOCYTES # BLD AUTO: 0.4 X10E3/UL (ref 0.1–0.9)
MONOCYTES NFR BLD AUTO: 5 %
NEUTROPHILS # BLD AUTO: 5.2 X10E3/UL (ref 1.4–7)
NEUTROPHILS NFR BLD AUTO: 63 %
NITRITE UR QL STRIP: NEGATIVE
PH UR STRIP: 6 [PH] (ref 5–7.5)
PLATELET # BLD AUTO: 364 X10E3/UL (ref 150–450)
POTASSIUM SERPL-SCNC: 4.2 MMOL/L (ref 3.5–5.2)
PROT SERPL-MCNC: 6.8 G/DL (ref 6–8.5)
PROT UR QL STRIP: NEGATIVE
RBC # BLD AUTO: 4.3 X10E6/UL (ref 3.77–5.28)
SODIUM SERPL-SCNC: 140 MMOL/L (ref 134–144)
SP GR UR: 1.02 (ref 1–1.03)
TRIGL SERPL-MCNC: 151 MG/DL (ref 0–149)
UROBILINOGEN UR STRIP-MCNC: 0.2 MG/DL (ref 0.2–1)
VLDLC SERPL CALC-MCNC: 30 MG/DL (ref 5–40)
WBC # BLD AUTO: 8.3 X10E3/UL (ref 3.4–10.8)

## 2019-07-29 DIAGNOSIS — L30.9 DERMATITIS: Primary | ICD-10-CM

## 2019-07-31 DIAGNOSIS — E03.9 ACQUIRED HYPOTHYROIDISM: Primary | ICD-10-CM

## 2019-09-24 RX ORDER — LEVOTHYROXINE SODIUM 112 UG/1
TABLET ORAL
Qty: 30 TAB | Refills: 3 | Status: SHIPPED | OUTPATIENT
Start: 2019-09-24 | End: 2020-01-22

## 2019-11-23 DIAGNOSIS — J30.1 SEASONAL ALLERGIC RHINITIS DUE TO POLLEN: ICD-10-CM

## 2019-11-24 RX ORDER — CETIRIZINE HCL 10 MG
TABLET ORAL
Qty: 30 TAB | Refills: 0 | Status: SHIPPED | OUTPATIENT
Start: 2019-11-24

## 2020-01-16 ENCOUNTER — OFFICE VISIT (OUTPATIENT)
Dept: ENDOCRINOLOGY | Age: 30
End: 2020-01-16

## 2020-01-16 VITALS
BODY MASS INDEX: 44.61 KG/M2 | SYSTOLIC BLOOD PRESSURE: 121 MMHG | HEIGHT: 62 IN | WEIGHT: 242.4 LBS | DIASTOLIC BLOOD PRESSURE: 81 MMHG | HEART RATE: 87 BPM

## 2020-01-16 DIAGNOSIS — E03.9 ACQUIRED HYPOTHYROIDISM: Primary | ICD-10-CM

## 2020-01-16 DIAGNOSIS — E55.9 VITAMIN D DEFICIENCY: ICD-10-CM

## 2020-01-16 DIAGNOSIS — R63.1 POLYDIPSIA: ICD-10-CM

## 2020-01-16 DIAGNOSIS — E53.8 B12 DEFICIENCY: ICD-10-CM

## 2020-01-16 RX ORDER — BISMUTH SUBSALICYLATE 262 MG
1 TABLET,CHEWABLE ORAL DAILY
COMMUNITY

## 2020-01-16 NOTE — PROGRESS NOTES
Chief Complaint   Patient presents with    Thyroid Problem     pcp and pharmacy verified   Records since last visit reviewed. History of Present Illness: Boyd Shaw is a 30 y.o. female here for follow up of hypothyroidism. Pt delivered on 4/4/19. At our last visit in May 2019 she was clinically and biochemically euthyroid on the Levoxyl 112mcg daily. She is still taking the Levoxyl 112mcg daily. Pt notes that she continues to note issues of palpitations, she also notes that has been feeling more fatigued as well. She notes that she has been feeing more thirsty and \"no mater how much I drink, my thirst is not quinched\"    \"The cardiologist said I had an extra heart beat, but that it was nothing to worry about (PVC?). She denies issues of CP, SOB, she does take cholestyramine for her chronic diarrhea. She denies issues of tremors, she notes \"I am always hot\". Her LMP was 12/13/19. Current Outpatient Medications   Medication Sig    multivitamin (ONE A DAY) tablet Take 1 Tab by mouth daily.  cetirizine (ZYRTEC) 10 mg tablet TAKE 1 TABLET BY MOUTH EVERY DAY    LEVOXYL 112 mcg tablet TAKE 1 TAB BY MOUTH DAILY BEFORE BREAKFAST    cholecalciferol (VITAMIN D3) 2,000 unit cap capsule Take 2,000 Units by mouth daily. Indications: Vitamin D Deficiency (High Dose Therapy) (Patient taking differently: Take 2,000 Units by mouth daily. Indications: vitamin D deficiency (high dose therapy))    mupirocin (BACTROBAN) 2 % ointment Apply  to affected area daily.  cholestyramine-sucrose (QUESTRAN) 4 gram powder Take  by mouth as needed.  albuterol (PROVENTIL HFA, VENTOLIN HFA, PROAIR HFA) 90 mcg/actuation inhaler Take 2 Puffs by inhalation every four (4) hours as needed for Wheezing. (Patient taking differently: Take 2 Puffs by inhalation every four (4) hours as needed for Wheezing.)    PNV No12-Iron-FA-DSS-OM-3 29 mg iron-1 mg -50 mg CPKD Take  by mouth daily.      No current facility-administered medications for this visit. Allergies   Allergen Reactions    Shellfish Derived Swelling     Wheezing    Banana Other (comments)     Abdominal pain      Keflex [Cephalexin] Rash     Review of Systems:  - Cardiovascular: no chest pain  - Neurological: no tremors  - Integumentary: skin is normal    Physical Examination:  Blood pressure 121/81, pulse 87, height 5' 2\" (1.575 m), weight 242 lb 6.4 oz (110 kg), unknown if currently breastfeeding.  - General: pleasant, no distress, good eye contact   - Neck: no thyromegaly or thyroid bruits  - Cardiovascular: regular, normal rate, nl s1 and s2, no m/r/g   - Integumentary: skin is normal, 2+ edema  - Neurological: reflexes 2+ at biceps, no tremors  - Psychiatric: normal mood and affect    Data Reviewed:   - none new for review    Assessment/Plan:   1) Hypothyroidism > Pt is clinically euthyroid on the Levoxyl 112mcg daily. Will check TFTs today and adjust her dose as needed. 2) Polydipsia > Will screen her for DM and DI. 3) Fatigue > Will check CMP, Vitamin B-12 and Vitamin D levels today. Pt voices understanding and agreement with the plan. RTC 6 months. Follow-up and Dispositions    · Return in about 6 months (around 7/16/2020).          Copy sent to:  Dr. Kianna Ruggiero

## 2020-01-17 LAB
25(OH)D3+25(OH)D2 SERPL-MCNC: 31.3 NG/ML (ref 30–100)
ALBUMIN SERPL-MCNC: 4.2 G/DL (ref 3.5–5.5)
ALBUMIN/GLOB SERPL: 1.6 {RATIO} (ref 1.2–2.2)
ALP SERPL-CCNC: 92 IU/L (ref 39–117)
ALT SERPL-CCNC: 17 IU/L (ref 0–32)
AST SERPL-CCNC: 15 IU/L (ref 0–40)
BILIRUB SERPL-MCNC: 0.3 MG/DL (ref 0–1.2)
BUN SERPL-MCNC: 9 MG/DL (ref 6–20)
BUN/CREAT SERPL: 14 (ref 9–23)
CALCIUM SERPL-MCNC: 9.4 MG/DL (ref 8.7–10.2)
CHLORIDE SERPL-SCNC: 105 MMOL/L (ref 96–106)
CO2 SERPL-SCNC: 18 MMOL/L (ref 20–29)
CREAT SERPL-MCNC: 0.66 MG/DL (ref 0.57–1)
EST. AVERAGE GLUCOSE BLD GHB EST-MCNC: 103 MG/DL
GLOBULIN SER CALC-MCNC: 2.6 G/DL (ref 1.5–4.5)
GLUCOSE SERPL-MCNC: 86 MG/DL (ref 65–99)
HBA1C MFR BLD: 5.2 % (ref 4.8–5.6)
MAGNESIUM SERPL-MCNC: 2 MG/DL (ref 1.6–2.3)
OSMOLALITY SERPL: 284 MOSMOL/KG (ref 275–295)
OSMOLALITY UR: 164 MOSMOL/KG
PHOSPHATE SERPL-MCNC: 2.6 MG/DL (ref 3–4.3)
POTASSIUM SERPL-SCNC: 4.2 MMOL/L (ref 3.5–5.2)
PROT SERPL-MCNC: 6.8 G/DL (ref 6–8.5)
SODIUM SERPL-SCNC: 139 MMOL/L (ref 134–144)
T4 FREE SERPL-MCNC: 1.21 NG/DL (ref 0.82–1.77)
TSH SERPL DL<=0.005 MIU/L-ACNC: 2.12 UIU/ML (ref 0.45–4.5)
VIT B12 SERPL-MCNC: 766 PG/ML (ref 232–1245)

## 2020-05-01 ENCOUNTER — TELEPHONE (OUTPATIENT)
Dept: ENDOCRINOLOGY | Age: 30
End: 2020-05-01

## 2020-05-01 DIAGNOSIS — E61.1 IRON DEFICIENCY: ICD-10-CM

## 2020-05-01 DIAGNOSIS — E03.9 ACQUIRED HYPOTHYROIDISM: Primary | ICD-10-CM

## 2020-05-01 NOTE — TELEPHONE ENCOUNTER
----- Message from Shira Vaughn sent at 4/30/2020  2:04 PM EDT -----  Regarding: /telephone  General Message/Vendor Calls    Caller's first and last name:      Reason for call: The pt stated she has not been feeling well lately due to her thyroid and that she would like to get a virtual appt set up. Callback required yes/no and why:yes.       Best contact number(s):[phone]

## 2020-05-01 NOTE — TELEPHONE ENCOUNTER
Spoke with patient. She states that for the past 2 weeks, she has had a lot of fatigue and dizziness. She said that sometimes she walks \"off balanced\" and occ. the room spins. Her legs have been very restless and disrupting her sleep. She is wondering if her thyroid may be off. She does take her thyroid on an empty stomach with a glass of water and waits over an hour prior to eating.

## 2020-05-05 LAB
ERYTHROCYTE [DISTWIDTH] IN BLOOD BY AUTOMATED COUNT: 13 % (ref 11.7–15.4)
FERRITIN SERPL-MCNC: 88 NG/ML (ref 15–150)
FT4I SERPL CALC-MCNC: 2.3 (ref 1.2–4.9)
HCT VFR BLD AUTO: 35.8 % (ref 34–46.6)
HGB BLD-MCNC: 11.9 G/DL (ref 11.1–15.9)
IRON SATN MFR SERPL: 17 % (ref 15–55)
IRON SERPL-MCNC: 44 UG/DL (ref 27–159)
MCH RBC QN AUTO: 29.5 PG (ref 26.6–33)
MCHC RBC AUTO-ENTMCNC: 33.2 G/DL (ref 31.5–35.7)
MCV RBC AUTO: 89 FL (ref 79–97)
PLATELET # BLD AUTO: 346 X10E3/UL (ref 150–450)
RBC # BLD AUTO: 4.04 X10E6/UL (ref 3.77–5.28)
T3 SERPL-MCNC: 108 NG/DL (ref 71–180)
T3RU NFR SERPL: 27 % (ref 24–39)
T4 FREE SERPL-MCNC: 1.29 NG/DL (ref 0.82–1.77)
T4 SERPL-MCNC: 8.4 UG/DL (ref 4.5–12)
TIBC SERPL-MCNC: 252 UG/DL (ref 250–450)
TSH SERPL DL<=0.005 MIU/L-ACNC: 2.73 UIU/ML (ref 0.45–4.5)
UIBC SERPL-MCNC: 208 UG/DL (ref 131–425)
WBC # BLD AUTO: 8.2 X10E3/UL (ref 3.4–10.8)

## 2020-06-08 DIAGNOSIS — J30.1 SEASONAL ALLERGIC RHINITIS DUE TO POLLEN: ICD-10-CM

## 2020-06-08 DIAGNOSIS — E55.9 HYPOVITAMINOSIS D: ICD-10-CM

## 2020-06-09 RX ORDER — ACETAMINOPHEN 500 MG
2000 TABLET ORAL DAILY
Qty: 90 CAP | Refills: 1 | OUTPATIENT
Start: 2020-06-09

## 2020-06-09 RX ORDER — CETIRIZINE HCL 10 MG
10 TABLET ORAL DAILY
Qty: 90 TAB | Refills: 1 | OUTPATIENT
Start: 2020-06-09

## 2020-06-11 ENCOUNTER — TELEPHONE (OUTPATIENT)
Dept: FAMILY MEDICINE CLINIC | Age: 30
End: 2020-06-11

## 2020-07-28 ENCOUNTER — VIRTUAL VISIT (OUTPATIENT)
Dept: ENDOCRINOLOGY | Age: 30
End: 2020-07-28

## 2020-07-28 ENCOUNTER — TELEPHONE (OUTPATIENT)
Dept: ENDOCRINOLOGY | Age: 30
End: 2020-07-28

## 2020-07-28 DIAGNOSIS — E03.9 ACQUIRED HYPOTHYROIDISM: Primary | ICD-10-CM

## 2020-07-28 DIAGNOSIS — E61.1 IRON DEFICIENCY: ICD-10-CM

## 2020-07-28 DIAGNOSIS — E55.9 VITAMIN D DEFICIENCY: ICD-10-CM

## 2020-07-28 RX ORDER — LEVOTHYROXINE SODIUM 112 UG/1
TABLET ORAL
Qty: 30 TAB | Refills: 5 | Status: SHIPPED | OUTPATIENT
Start: 2020-07-28 | End: 2021-01-10 | Stop reason: SDUPTHER

## 2020-07-28 NOTE — PROGRESS NOTES
Chief Complaint   Patient presents with    Thyroid Problem     doxy: [id number]    Medication Reaction     [facility]   Records since last visit reviewed. **THIS IS A VIRTUAL VISIT VIA A VIDEO ENCOUNTER. PATIENT AGREED TO HAVE THEIR CARE DELIVERED OVER VIDEO IN PLACE OF THEIR REGULARLY SCHEDULED OFFICE VISIT**      History of Present Illness: Cr Levine is a 30 y.o. female here for follow up of hypothyroidism. At our last visit in January 2020 she was clinically and biochemically euthyroid on the Levoxyl 112mcg daily. Her Vitamin D level was 31.3 so pt was instructed to continue the 2000 units daily. She is still taking the Levoxyl 112mcg daily. Pt notes she is \"doing ok most days, but for the last 2-3 months I am not able to sleep at night, I am just awake, and I am having issues of my legs feeling restless and uncomfortable\". She notes some improvement with taking a Magnesium supplement, but not much. She notes that since she is not sleeping at night, she is feeling more tired during the day. She denies starting or stopping any medications before the onset of the restless leg symptoms. She denies any recent illnesses, injuries, traumas or hospitalizations. She denies issues of CP, SOB, she does take cholestyramine for her chronic diarrhea. She denies issues of tremors, she notes \"I am always hot\". She will still get the palpitations, she was evaluated by cardiology and told she has PVCs. Her LMP was 7/15/20, she notes her cycle has been regular in timing and duration. Current Outpatient Medications   Medication Sig    LevoxyL 112 mcg tablet TAKE 1 TABLET BY MOUTH EVERY MORNING BEFORE BREAKFAST    multivitamin (ONE A DAY) tablet Take 1 Tab by mouth daily.  cetirizine (ZYRTEC) 10 mg tablet TAKE 1 TABLET BY MOUTH EVERY DAY    cholecalciferol (VITAMIN D3) 2,000 unit cap capsule Take 2,000 Units by mouth daily.  Indications: Vitamin D Deficiency (High Dose Therapy) (Patient taking differently: Take 2,000 Units by mouth daily. Indications: vitamin D deficiency (high dose therapy))    albuterol (PROVENTIL HFA, VENTOLIN HFA, PROAIR HFA) 90 mcg/actuation inhaler Take 2 Puffs by inhalation every four (4) hours as needed for Wheezing. (Patient taking differently: Take 2 Puffs by inhalation every four (4) hours as needed for Wheezing.)    mupirocin (BACTROBAN) 2 % ointment Apply  to affected area daily.  PNV No12-Iron-FA-DSS-OM-3 29 mg iron-1 mg -50 mg CPKD Take  by mouth daily.  cholestyramine-sucrose (QUESTRAN) 4 gram powder Take  by mouth as needed. No current facility-administered medications for this visit.       Allergies   Allergen Reactions    Shellfish Derived Swelling     Wheezing    Banana Other (comments)     Abdominal pain      Keflex [Cephalexin] Rash     Review of Systems:  - Cardiovascular: no chest pain  - Neurological: no tremors  - Integumentary: skin is normal    Physical Examination:  unknown if currently breastfeeding.  - GENERAL: NCAT, Appears well nourished   - EYES: EOMI, non-icteric, no proptosis   - Ear/Nose/Throat: NCAT, no visible inflammation or masses   - CARDIOVASCULAR: no cyanosis, no visible JVD   - RESPIRATORY: respiratory effort normal without any distress or labored breathing   - MUSCULOSKELETAL: Normal ROM of neck and upper extremities observed   - SKIN: No rash on face   - NEUROLOGIC:  No facial asymmetry (Cranial nerve 7 motor function), No gaze palsy   - PSYCHIATRIC: Normal affect, Normal insight and judgement   -     Data Reviewed:   Component      Latest Ref Rng & Units 5/4/2020 5/4/2020 5/4/2020 5/4/2020           1:20 PM  1:20 PM  1:20 PM  1:20 PM   WBC      3.4 - 10.8 x10E3/uL    8.2   RBC      3.77 - 5.28 x10E6/uL    4.04   HGB      11.1 - 15.9 g/dL    11.9   HCT      34.0 - 46.6 %    35.8   MCV      79 - 97 fL    89   MCH      26.6 - 33.0 pg    29.5   MCHC      31.5 - 35.7 g/dL    33.2   RDW      11.7 - 15.4 %    13.0 PLATELET      137 - 387 x10E3/uL    346   TSH      0.450 - 4.500 uIU/mL   2.730    T4, Total      4.5 - 12.0 ug/dL   8.4    T3 Uptake      24 - 39 %   27    Free Thyroxine Index (FTI)      1.2 - 4.9   2.3    Ferritin      15 - 150 ng/mL       T4, Free      0.82 - 1.77 ng/dL  1.29     T3, total      71 - 180 ng/dL 108        Component      Latest Ref Rng & Units 5/4/2020 5/4/2020           1:20 PM  1:20 PM   TIBC      250 - 450 ug/dL  252   UIBC      131 - 425 ug/dL  208   Iron      27 - 159 ug/dL  44   Iron % saturation      15 - 55 %  17   Ferritin      15 - 150 ng/mL 88        Assessment/Plan:   1) Hypothyroidism > Pt is clinically and biochemically euthyroid on the Levoxyl 112mcg daily. Pt to continue the Levoxyl 112mcg daily. Will refill her prescription    2) Iron deficiency > Her iron level and CBC were a little low, but she has restarted her iron supplement, particularly around the time of her menstrual cycle. Pt to continue her iron supplement daily. 3) Hypovitaminosis D > Pt to continue the Vitamin D 2000 units daily. 4) RLS > Will start pt on a topical to help with the RLS and see if that will help (Brad Emu). Pt voices understanding and agreement with the plan. RTC 6 months.            Copy sent to:  Dr. Anisha Ortega

## 2020-07-28 NOTE — TELEPHONE ENCOUNTER
----- Message from Anisa Wooten sent at 7/28/2020  2:44 PM EDT -----  Regarding: /Telephone  Patient return call    Caller's first and last name and relationship (if not the patient):      Best contact number(s):[phone]      Whose call is being returned: the nurse      Details to clarify the request: Pt returning a call from a miss call .       Anisa Wooten

## 2020-12-29 ENCOUNTER — TELEPHONE (OUTPATIENT)
Dept: INTERNAL MEDICINE CLINIC | Age: 30
End: 2020-12-29

## 2020-12-29 NOTE — TELEPHONE ENCOUNTER
----- Message from Cinthia Juarez sent at 12/29/2020  1:25 PM EST -----  Regarding: Dr. Cazares Ran / telephone  General Message/Vendor Calls    Caller's first and last name: NA      Reason for call: Set up NP appt with Dr. Karrie Villeda required yes/no and why: Yes       Best contact number(s): [phone]      Details to clarify the request: Patient would like to est a PCP with Dr Júnior Villasenor from [facility]

## 2021-01-04 DIAGNOSIS — E55.9 VITAMIN D DEFICIENCY: ICD-10-CM

## 2021-01-04 DIAGNOSIS — E03.9 ACQUIRED HYPOTHYROIDISM: ICD-10-CM

## 2021-01-04 DIAGNOSIS — E61.1 IRON DEFICIENCY: ICD-10-CM

## 2021-01-06 ENCOUNTER — OFFICE VISIT (OUTPATIENT)
Dept: PRIMARY CARE CLINIC | Age: 31
End: 2021-01-06
Payer: COMMERCIAL

## 2021-01-06 ENCOUNTER — HOSPITAL ENCOUNTER (OUTPATIENT)
Dept: ULTRASOUND IMAGING | Age: 31
Discharge: HOME | End: 2021-01-06
Attending: INTERNAL MEDICINE
Payer: COMMERCIAL

## 2021-01-06 VITALS
HEART RATE: 84 BPM | BODY MASS INDEX: 46.22 KG/M2 | RESPIRATION RATE: 16 BRPM | DIASTOLIC BLOOD PRESSURE: 82 MMHG | OXYGEN SATURATION: 100 % | SYSTOLIC BLOOD PRESSURE: 119 MMHG | HEIGHT: 62 IN | TEMPERATURE: 97.5 F | WEIGHT: 251.2 LBS

## 2021-01-06 DIAGNOSIS — E66.01 OBESITY, MORBID (HCC): ICD-10-CM

## 2021-01-06 DIAGNOSIS — M79.10 MYALGIA: ICD-10-CM

## 2021-01-06 DIAGNOSIS — E03.9 ACQUIRED HYPOTHYROIDISM: ICD-10-CM

## 2021-01-06 DIAGNOSIS — F34.1 DYSTHYMIA: ICD-10-CM

## 2021-01-06 DIAGNOSIS — R53.82 CHRONIC FATIGUE: ICD-10-CM

## 2021-01-06 DIAGNOSIS — E03.9 ACQUIRED HYPOTHYROIDISM: Primary | ICD-10-CM

## 2021-01-06 DIAGNOSIS — E04.1 THYROID NODULE: ICD-10-CM

## 2021-01-06 PROBLEM — R60.0 LOCALIZED EDEMA: Status: RESOLVED | Noted: 2017-10-24 | Resolved: 2021-01-06

## 2021-01-06 PROCEDURE — 99204 OFFICE O/P NEW MOD 45 MIN: CPT | Performed by: INTERNAL MEDICINE

## 2021-01-06 PROCEDURE — 76536 US EXAM OF HEAD AND NECK: CPT

## 2021-01-06 RX ORDER — BUPROPION HYDROCHLORIDE 100 MG/1
100 TABLET, EXTENDED RELEASE ORAL 2 TIMES DAILY
Qty: 60 TAB | Refills: 0 | Status: SHIPPED | OUTPATIENT
Start: 2021-01-06 | End: 2021-01-28 | Stop reason: DRUGHIGH

## 2021-01-06 NOTE — PROGRESS NOTES
Chief Complaint   Patient presents with   [facility] Establish Care     patient states that she thinks that she is having trouble with the thyroid and feeling the last few months she is exhausted and sleeping a lot

## 2021-01-06 NOTE — PROGRESS NOTES
Written by Ami Cotto, as dictated by Dr. Kai Arellano MD.    Andreina White (: 1990) is a 26 y.o. female, established patient, here for evaluation of the following chief complaint(s):  Establish Care (patient states that she thinks that she is having trouble with the thyroid and feeling the last few months she is exhausted and sleeping a lot)     SUBJECTIVE/OBJECTIVE:  HPI  Pt presents today to discuss fatigue x2 months. She had been following with Dr. Vazquez Bauman but wanted find a new endocrinologist because she felt like he was not listening to her well. She has been feeling fatigue and muscle weakness, and she has been checking her BS regularly to see if this is diabetes-related. At night she starts to feel shaky like she is having a hypoglycemic episode but then checks her BS with a normal reading. She is often times exhausted and has to sleep a lot, but even when she is well-rested she feels tired, and this is leading to feelings of depression. She also does not feel like herself and often has a feeling of brain fog or forgetfulness. She was taking levothyroxine for her hypothyroidism at first but was having anxiety. When she switched to Levoxyl, she noticed an improvement. She gets leg swelling, and her calves sometimes swell so much that she gets restless leg sx from it. She has taken HCTZ for this in the past with some relief but is not anymore. She continues on cholestyramine-sucrose to prevent diarrhea. She had her Pap smear done in 2019 and had a baby in 2019.      Patient Active Problem List   Diagnosis Code    Chronic LBP M54.5, G89.29    Mild intermittent asthma with acute exacerbation J45.21    Environmental and seasonal allergies J30.89    H/O extrinsic asthma Z87.09    Obesity, morbid ([facility] [address] E66.01    Acquired hypothyroidism E03.9        Current Outpatient Medications on File Prior to Visit   Medication Sig Dispense Refill    LevoxyL 112 mcg tablet TAKE 1 TABLET BY MOUTH EVERY MORNING BEFORE BREAKFAST 30 Tab 5    multivitamin (ONE A DAY) tablet Take 1 Tab by mouth daily.  cetirizine (ZYRTEC) 10 mg tablet TAKE 1 TABLET BY MOUTH EVERY DAY 30 Tab 0    cholecalciferol (VITAMIN D3) 2,000 unit cap capsule Take 2,000 Units by mouth daily. Indications: Vitamin D Deficiency (High Dose Therapy) (Patient taking differently: Take 2,000 Units by mouth daily. Indications: vitamin D deficiency (high dose therapy)) 90 Cap 1    cholestyramine-sucrose (QUESTRAN) 4 gram powder Take  by mouth as needed.  albuterol (PROVENTIL HFA, VENTOLIN HFA, PROAIR HFA) 90 mcg/actuation inhaler Take 2 Puffs by inhalation every four (4) hours as needed for Wheezing. (Patient taking differently: Take 2 Puffs by inhalation every four (4) hours as needed for Wheezing.) 1 Inhaler 0     No current facility-administered medications on file prior to visit.         Allergies   Allergen Reactions    Shellfish Derived Swelling     Wheezing    Banana Other (comments)     Abdominal pain      Keflex [Cephalexin] Rash       Past Medical History:   Diagnosis Date    Asthma     exercise / cold induced doesn't use inhaler    Hypothyroid     Interstitial cystitis        Past Surgical History:   Procedure Laterality Date    COLONOSCOPY N/A 1/18/2018    COL[facility]COPY performed by [facility] at [facility]    HX CHOLECYSTECTOMY         Family History   Problem Relation Age of Onset    Hypertension Mother     Diabetes Mother     Heart Disease Mother         Arrythmia    Diabetes Father     Hypertension Father     Diabetes Sister         Borderline    Cancer Brother         Brain Tumor    Other Maternal Grandmother         during childbirth    No Known Problems Maternal Grandfather     Heart Failure Paternal Grandmother     Diabetes Paternal Grandmother     Hypertension Paternal Grandmother     No Known Problems Paternal Grandfather     Thyroid Disease Maternal Aunt     No Known Problems Sister    • Other Brother         eye issues   • Thyroid Cancer Maternal Aunt    • Cancer Maternal Aunt         Thyroid       Social History     Socioeconomic History   • Marital status: SINGLE     Spouse name: Not on file   • Number of children: Not on file   • Years of education: Not on file   • Highest education level: Not on file   Occupational History   • Not on file   Social Needs   • Financial resource strain: Not on file   • Food insecurity     Worry: Not on file     Inability: Not on file   • Transportation needs     Medical: Not on file     Non-medical: Not on file   Tobacco Use   • Smoking status: Never Smoker   • Smokeless tobacco: Never Used   Substance and Sexual Activity   • Alcohol use: No   • Drug use: No   • Sexual activity: Yes     Partners: Male     Birth control/protection: I.U.D.   Lifestyle   • Physical activity     Days per week: Not on file     Minutes per session: Not on file   • Stress: Not on file   Relationships   • Social connections     Talks on phone: Not on file     Gets together: Not on file     Attends Congregational service: Not on file     Active member of club or organization: Not on file     Attends meetings of clubs or organizations: Not on file     Relationship status: Not on file   • Intimate partner violence     Fear of current or ex partner: Not on file     Emotionally abused: Not on file     Physically abused: Not on file     Forced sexual activity: Not on file   Other Topics Concern   • Not on file   Social History Narrative   • Not on file       No visits with results within 3 Month(s) from this visit.   Latest known visit with results is:   Telephone on 05/01/2020   Component Date Value Ref Range Status   • TIBC 05/04/2020 252  250 - 450 ug/dL Final   • UIBC 05/04/2020 208  131 - 425 ug/dL Final   • Iron 05/04/2020 44  27 - 159 ug/dL Final   • Iron % saturation 05/04/2020 17  15 - 55 % Final   • Ferritin 05/04/2020 88  15 - 150 ng/mL Final   • WBC 05/04/2020  8.2  3.4 - 10.8 x10E3/uL Final    RBC 05/04/2020 4.04  3.77 - 5.28 x10E6/uL Final    HGB 05/04/2020 11.9  11.1 - 15.9 g/dL Final    HCT 05/04/2020 35.8  34.0 - 46.6 % Final    MCV 05/04/2020 89  79 - 97 fL Final    MCH 05/04/2020 29.5  26.6 - 33.0 pg Final    MCHC 05/04/2020 33.2  31.5 - 35.7 g/dL Final    RDW 05/04/2020 13.0  11.7 - 15.4 % Final    PLATELET 64/33/6906 978  150 - 450 x10E3/uL Final    TSH 05/04/2020 2.730  0.450 - 4.500 uIU/mL Final    T4, Total 05/04/2020 8.4  4.5 - 12.0 ug/dL Final    T3 Uptake 05/04/2020 27  24 - 39 % Final    Free Thyroxine Index (FTI) 05/04/2020 2.3  1.2 - 4.9 Final    T4, Free 05/04/2020 1.29  0.82 - 1.77 ng/dL Final    T3, total 05/04/2020 108  71 - 180 ng/dL Final     Review of Systems   Constitutional: Negative for activity change, fatigue and unexpected weight change. HENT: Negative for congestion, hearing loss, rhinorrhea and sore throat. Eyes: Negative for discharge. Respiratory: Negative for cough, chest tightness and shortness of breath. Cardiovascular: Positive for leg swelling. Gastrointestinal: Negative for abdominal pain, constipation and diarrhea. Genitourinary: Negative for dysuria, flank pain, frequency and urgency. Musculoskeletal: Negative for arthralgias, back pain and myalgias. Skin: Negative for color change and rash. Neurological: Negative for dizziness, light-headedness and headaches. Psychiatric/Behavioral: Negative for dysphoric mood and sleep disturbance. The patient is not nervous/anxious. Visit Vitals  /82 (BP 1 Location: Left arm, BP Patient Position: Sitting)   Pulse 84   Temp 97.5 °F (36.4 °C) (Oral)   Resp 16   Ht 5' 2\" (1.575 m)   Wt 251 lb 3.2 oz (113.9 kg)   LMP 12/12/2020   SpO2 100%   BMI 45.95 kg/m²     Physical Exam  Vitals signs and nursing note reviewed. Constitutional:       General: She is not in acute distress. Appearance: Normal appearance. She is well-developed.  She is not diaphoretic. HENT:      Right Ear: External ear normal.      Left Ear: External ear normal.   Eyes:      General: No scleral icterus. Right eye: No discharge. Left eye: No discharge. Extraocular Movements: Extraocular movements intact. Conjunctiva/sclera: Conjunctivae normal.   Neck:      Musculoskeletal: Normal range of motion and neck supple. Cardiovascular:      Rate and Rhythm: Normal rate and regular rhythm. Pulmonary:      Effort: Pulmonary effort is normal.      Breath sounds: Normal breath sounds. No wheezing. Abdominal:      General: Bowel sounds are normal.      Palpations: Abdomen is soft. Tenderness: There is no abdominal tenderness. Lymphadenopathy:      Cervical: No cervical adenopathy. Neurological:      Mental Status: She is alert and oriented to person, place, and time. Psychiatric:         Mood and Affect: Mood and affect normal.       ASSESSMENT/PLAN:  1. Acquired hypothyroidism  -     METABOLIC PANEL, COMPREHENSIVE; Future  -     CBC W/O DIFF; Future  -     TSH 3RD GENERATION; Future  -     T4, FREE; Future  -     JEF, DIRECT, W/REFLEX; Future  -     SED RATE (ESR); Future  -     LIPID PANEL; Future  -     US THYROID/PARATHYROID/SOFT TISS; Future  Ordered fasting labs for pt to complete today in office. Waiting on results. If she continues to feel off while taking her Levoxyl, we will consider trying non-synthetic medication. 2. Obesity, morbid ([address]  I commended the pt on their healthy lifestyle choices and routines. Explained that they should be walking 5,000 steps daily to maintain conditioning and weight and increase to at least 10,000 steps to work on losing weight. 3. Chronic fatigue  I ordered labs to evaluate for an underlying cause for her fatigue and myalgias. 4. Myalgia  I ordered labs to evaluate for an underlying cause for her fatigue and myalgias. 5. Dysthymia  -     buPROPion SR (WELLBUTRIN SR) 100 mg SR tablet;  Take 1 Tab by mouth two (2) times a day for 30 days. , Normal, Disp-60 Tab, R-0 sent to pharmacy. Potential side effects were discussed. I prescribed Wellbutrin and instructed her to take 1 tablet every day for the next week. If she is not having sfx she can increase to taking it BID after that. I instructed her to let me know how she is feeling soon. 6. Thyroid nodule  I ordered an US for further evaluation. This plan was reviewed with the patient and patient agrees. All questions were answered. This scribe documentation was reviewed by me and accurately reflects the examination and decisions made by me. An electronic signature was used to authenticate this note.   -- Rosy Cedillo

## 2021-01-10 DIAGNOSIS — E03.9 ACQUIRED HYPOTHYROIDISM: Primary | ICD-10-CM

## 2021-01-10 DIAGNOSIS — M79.89 LEG SWELLING: Primary | ICD-10-CM

## 2021-01-10 RX ORDER — BUMETANIDE 1 MG/1
1 TABLET ORAL DAILY
Qty: 30 TAB | Refills: 0 | Status: SHIPPED | OUTPATIENT
Start: 2021-01-10 | End: 2021-02-09

## 2021-01-10 RX ORDER — LEVOTHYROXINE SODIUM 112 UG/1
TABLET ORAL
Qty: 30 TAB | Refills: 5 | Status: SHIPPED | OUTPATIENT
Start: 2021-01-10 | End: 2021-05-14 | Stop reason: ALTCHOICE

## 2021-01-28 ENCOUNTER — OFFICE VISIT (OUTPATIENT)
Dept: PRIMARY CARE CLINIC | Age: 31
End: 2021-01-28
Payer: COMMERCIAL

## 2021-01-28 VITALS
OXYGEN SATURATION: 98 % | SYSTOLIC BLOOD PRESSURE: 120 MMHG | TEMPERATURE: 97.3 F | RESPIRATION RATE: 16 BRPM | WEIGHT: 246 LBS | HEIGHT: 62 IN | HEART RATE: 85 BPM | BODY MASS INDEX: 45.27 KG/M2 | DIASTOLIC BLOOD PRESSURE: 81 MMHG

## 2021-01-28 DIAGNOSIS — F34.1 DYSTHYMIA: ICD-10-CM

## 2021-01-28 DIAGNOSIS — R35.89 POLYURIA: ICD-10-CM

## 2021-01-28 DIAGNOSIS — E03.9 ACQUIRED HYPOTHYROIDISM: Primary | ICD-10-CM

## 2021-01-28 DIAGNOSIS — R63.1 POLYDIPSIA: ICD-10-CM

## 2021-01-28 DIAGNOSIS — E66.01 OBESITY, MORBID (HCC): ICD-10-CM

## 2021-01-28 DIAGNOSIS — R53.82 CHRONIC FATIGUE: ICD-10-CM

## 2021-01-28 PROCEDURE — 99214 OFFICE O/P EST MOD 30 MIN: CPT | Performed by: INTERNAL MEDICINE

## 2021-01-28 RX ORDER — BUPROPION HYDROCHLORIDE 150 MG/1
150 TABLET ORAL
Qty: 30 TAB | Refills: 0 | Status: SHIPPED | OUTPATIENT
Start: 2021-01-28 | End: 2021-04-08 | Stop reason: SDUPTHER

## 2021-01-28 RX ORDER — LIOTHYRONINE SODIUM 5 UG/1
5 TABLET ORAL DAILY
Qty: 30 TAB | Refills: 0 | Status: SHIPPED | OUTPATIENT
Start: 2021-01-28 | End: 2021-02-27

## 2021-01-28 NOTE — PROGRESS NOTES
Chief Complaint   Patient presents with    Fatigue     states that she is tired and is still haing lightheadedness.   and is thinking her thyroid is not right

## 2021-01-28 NOTE — PROGRESS NOTES
Written by Mary Harman, as dictated by Dr. Mukund Couch MD.    Akil Lagos (: 1990) is a 26 y.o. female, established patient, here for evaluation of the following chief complaint(s):  Fatigue (states that she is tired and is still haing lightheadedness. and is thinking her thyroid is not right)      SUBJECTIVE/OBJECTIVE:  HPI  Pt presents today to discuss fatigue. She sometimes gets lightheaded and feels like her legs are going to give out beneath her. She notes that she drinks a lot of water only drinks water but still gets very thirsty, even if she just drank a glass of water just a few minutes ago. She also has to urinate a lot, which she attributes to staying hydrated and taking Bumex, which is doing a great job of getting the fluid out of her legs. She is doing well on Wellbutrin and has been able to do well with her snacking. Since starting it, she has been able to be more mindful of her eating and does not eat out of boredom or feeling down. As a result, she has lost weight from 251 lb on 21 to 246 lb today. Patient Active Problem List   Diagnosis Code    Chronic LBP M54.5, G89.29    Mild intermittent asthma with acute exacerbation J45.21    Environmental and seasonal allergies J30.89    H/O extrinsic asthma Z87.09    Obesity, morbid ([facility] [address] E66.01    Acquired hypothyroidism E03.9        Current Outpatient Medications on File Prior to Visit   Medication Sig Dispense Refill    bumetanide (BUMEX) 1 mg tablet Take 1 Tab by mouth daily for 30 days. 30 Tab 0    LevoxyL 112 mcg tablet TAKE 1 TABLET BY MOUTH EVERY MORNING BEFORE BREAKFAST 30 Tab 5    multivitamin (ONE A DAY) tablet Take 1 Tab by mouth daily.  cetirizine (ZYRTEC) 10 mg tablet TAKE 1 TABLET BY MOUTH EVERY DAY 30 Tab 0    cholecalciferol (VITAMIN D3) 2,000 unit cap capsule Take 2,000 Units by mouth daily.  Indications: Vitamin D Deficiency (High Dose Therapy) (Patient taking differently: Take 2,000 Units by mouth daily. Indications: vitamin D deficiency (high dose therapy)) 90 Cap 1    cholestyramine-sucrose (QUESTRAN) 4 gram powder Take  by mouth as needed.  albuterol (PROVENTIL HFA, VENTOLIN HFA, PROAIR HFA) 90 mcg/actuation inhaler Take 2 Puffs by inhalation every four (4) hours as needed for Wheezing. (Patient taking differently: Take 2 Puffs by inhalation every four (4) hours as needed for Wheezing.) 1 Inhaler 0    [DISCONTINUED] buPROPion SR (WELLBUTRIN SR) 100 mg SR tablet Take 1 Tab by mouth two (2) times a day for 30 days. 60 Tab 0     No current facility-administered medications on file prior to visit.         Allergies   Allergen Reactions    Shellfish Derived Swelling     Wheezing    Banana Other (comments)     Abdominal pain      Keflex [Cephalexin] Rash       Past Medical History:   Diagnosis Date    Asthma     exercise / cold induced doesn't use inhaler    Hypothyroid     Interstitial cystitis        Past Surgical History:   Procedure Laterality Date    COLONOSCOPY N/A 1/18/2018    COL[facility]    HX CHOLECYSTECTOMY         Family History   Problem Relation Age of Onset    Hypertension Mother     Diabetes Mother     Heart Disease Mother         Arrythmia    Diabetes Father     Hypertension Father     Diabetes Sister         Borderline    Cancer Brother         Brain Tumor    Other Maternal Grandmother         during childbirth    No Known Problems Maternal Grandfather     Heart Failure Paternal Grandmother     Diabetes Paternal Grandmother     Hypertension Paternal Grandmother     No Known Problems Paternal Grandfather     Thyroid Disease Maternal Aunt     No Known Problems Sister     Other Brother         eye issues    Thyroid Cancer Maternal Aunt     Cancer Maternal Aunt         Thyroid       Social History     Socioeconomic History    Marital status: SINGLE     Spouse name: Not on file    Number of children: Not on file    Years of education: Not on file    Highest education level: Not on file   Occupational History    Not on file   Social Needs    Financial resource strain: Not on file    Food insecurity     Worry: Not on file     Inability: Not on file    Transportation needs     Medical: Not on file     Non-medical: Not on file   Tobacco Use    Smoking status: Never Smoker    Smokeless tobacco: Never Used   Substance and Sexual Activity    Alcohol use: No    Drug use: No    Sexual activity: Yes     Partners: Male     Birth control/protection: I.U.D. Lifestyle    Physical activity     Days per week: Not on file     Minutes per session: Not on file    Stress: Not on file   Relationships    Social connections     Talks on phone: Not on file     Gets together: Not on file     Attends Ten[employer] service: Not on file     Active member of club or organization: Not on file     Attends meetings of clubs or organizations: Not on file     Relationship status: Not on file    Intimate partner violence     Fear of current or ex partner: Not on file     Emotionally abused: Not on file     Physically abused: Not on file     Forced sexual activity: Not on file   Other Topics Concern    Not on file   Social History Narrative    Not on file       Orders Only on 01/06/2021   Component Date Value Ref Range Status    LIPID PROFILE 01/06/2021        Final    Cholesterol, total 01/06/2021 166  <200 MG/DL Final    Triglyceride 01/06/2021 117  <150 MG/DL Final    Comment: Based on NCEP-ATP III:  Triglycerides <150 mg/dL  is considered normal, 150-199  mg/dL  borderline high,  200-499 mg/dL high and  greater than or equal to 500  mg/dL very high.  HDL Cholesterol 01/06/2021 47  MG/DL Final    Comment: Based on NCEP ATP III, HDL Cholesterol <40 mg/dL is considered low and >60  mg/dL is elevated.       LDL, calculated 01/06/2021 95.6  0 - 100 MG/DL Final    Comment: Based on the NCEP-ATP: LDL-C concentrations are considered  optimal <100 mg/dL,  near optimal/above Normal 100-129 mg/dL Borderline High: 130-159, High: 160-189  mg/dL Very High: Greater than or equal to 190 mg/dL      VLDL, calculated 01/06/2021 23.4  MG/DL Final    CHOL/HDL Ratio 01/06/2021 3.5  0.0 - 5.0   Final    Sed rate, automated 01/06/2021 12  0 - 20 mm/hr Final    JEF, Direct 01/06/2021 Negative  Negative   Final    Comment: (NOTE)  Performed At: [address] [id number]  Geoff Mcdonough MD ES:[id number]      T4, Free 01/06/2021 1.1  0.8 - 1.5 NG/DL Final    TSH 01/06/2021 2.09  0.36 - 3.74 uIU/mL Final    Comment:   Due to TSH heterogeneity, both structurally and degree of glycosylation,  monoclonal antibodies used in the TSH assay may not accurately quantitate TSH.   Therefore, this result should be correlated with clinical findings as well as  with other assessments of thyroid function, e.g., free T4, free T3.      WBC 01/06/2021 8.1  3.6 - 11.0 K/uL Final    RBC 01/06/2021 4.21  3.80 - 5.20 M/uL Final    HGB 01/06/2021 12.3  11.5 - 16.0 g/dL Final    HCT 01/06/2021 38.4  35.0 - 47.0 % Final    MCV 01/06/2021 91.2  80.0 - 99.0 FL Final    MCH 01/06/2021 29.2  26.0 - 34.0 PG Final    MCHC 01/06/2021 32.0  30.0 - 36.5 g/dL Final    RDW 01/06/2021 13.9  11.5 - 14.5 % Final    PLATELET 61/19/2469 887  150 - 400 K/uL Final    MPV 01/06/2021 11.5  8.9 - 12.9 FL Final    NRBC 01/06/2021 0.0  0  WBC Final    ABSOLUTE NRBC 01/06/2021 0.00  0.00 - 0.01 K/uL Final    Sodium 01/06/2021 136  136 - 145 mmol/L Final    Potassium 01/06/2021 4.0  3.5 - 5.1 mmol/L Final    Chloride 01/06/2021 108  97 - 108 mmol/L Final    CO2 01/06/2021 24  21 - 32 mmol/L Final    Anion gap 01/06/2021 4* 5 - 15 mmol/L Final    Glucose 01/06/2021 81  65 - 100 mg/dL Final    BUN 01/06/2021 10  6 - 20 MG/DL Final    Creatinine 01/06/2021 0.67  0.55 - 1.02 MG/DL Final    BUN/Creatinine ratio 01/06/2021 15  12 - 20   Final   • GFR est AA 01/06/2021 >60  >60 ml/min/1.73m2 Final   • GFR est non-AA 01/06/2021 >60  >60 ml/min/1.73m2 Final    Comment: Estimated GFR is calculated using the IDMS-traceable Modification of Diet in  Renal Disease (MDRD) Study equation, reported for both  Americans  (GFRAA) and non- Americans (GFRNA), and normalized to 1.73m2 body  surface area. The physician must decide which value applies to the patient.     • Calcium 01/06/2021 9.1  8.5 - 10.1 MG/DL Final   • Bilirubin, total 01/06/2021 0.4  0.2 - 1.0 MG/DL Final   • ALT (SGPT) 01/06/2021 21  12 - 78 U/L Final   • AST (SGOT) 01/06/2021 11* 15 - 37 U/L Final   • Alk. phosphatase 01/06/2021 82  45 - 117 U/L Final   • Protein, total 01/06/2021 7.3  6.4 - 8.2 g/dL Final   • Albumin 01/06/2021 3.9  3.5 - 5.0 g/dL Final   • Globulin 01/06/2021 3.4  2.0 - 4.0 g/dL Final   • A-G Ratio 01/06/2021 1.1  1.1 - 2.2   Final   • SAMPLES BEING HELD 01/06/2021 1SST   Final   • COMMENT 01/06/2021 Add-on orders for these samples will be processed based on acceptable specimen integrity and analyte stability, which may vary by analyte.    Final     Review of Systems   Constitutional: Positive for fatigue. Negative for activity change and unexpected weight change.   HENT: Negative for congestion, hearing loss, rhinorrhea and sore throat.    Eyes: Negative for discharge.   Respiratory: Negative for cough, chest tightness and shortness of breath.    Cardiovascular: Negative for leg swelling.   Gastrointestinal: Negative for abdominal pain, constipation and diarrhea.   Endocrine: Positive for polydipsia and polyuria.   Genitourinary: Negative for dysuria, flank pain, frequency and urgency.   Musculoskeletal: Negative for arthralgias, back pain and myalgias.   Skin: Negative for color change and rash.   Neurological: Negative for dizziness, light-headedness and headaches.   Psychiatric/Behavioral: Negative for dysphoric mood and sleep disturbance. The patient is  not nervous/anxious. Visit Vitals  /81 (BP 1 Location: Left arm, BP Patient Position: Sitting)   Pulse 85   Temp 97.3 °F (36.3 °C) (Oral)   Resp 16   Ht 5' 2\" (1.575 m)   Wt 246 lb (111.6 kg)   LMP 01/08/2021   SpO2 98%   BMI 44.99 kg/m²     Physical Exam  Vitals signs and nursing note reviewed. Constitutional:       General: She is not in acute distress. Appearance: Normal appearance. She is well-developed. She is not diaphoretic. HENT:      Right Ear: External ear normal.      Left Ear: External ear normal.   Eyes:      General: No scleral icterus. Right eye: No discharge. Left eye: No discharge. Extraocular Movements: Extraocular movements intact. Conjunctiva/sclera: Conjunctivae normal.   Neck:      Musculoskeletal: Normal range of motion and neck supple. Cardiovascular:      Rate and Rhythm: Normal rate and regular rhythm. Pulmonary:      Effort: Pulmonary effort is normal.      Breath sounds: Normal breath sounds. No wheezing. Abdominal:      General: Bowel sounds are normal.      Palpations: Abdomen is soft. Tenderness: There is no abdominal tenderness. Lymphadenopathy:      Cervical: No cervical adenopathy. Neurological:      Mental Status: She is alert and oriented to person, place, and time. Psychiatric:         Mood and Affect: Mood and affect normal.         ASSESSMENT/PLAN:  1. Acquired hypothyroidism  -     liothyronine (CYTOMEL) 5 mcg tablet; Take 1 Tab by mouth daily for 30 days. sent to pharmacy. We discussed that patients who have hypothyroidism and get their TSH back into normal values but still feel tired can benefit from taking T3. I prescribed cytomel and instructed her to come back in 2 months to have her TSH checked.  She should continue on levoxyl 112 mcg.     2. Chronic fatigue   I ordered labs to evaluate or SIADH first. If these come back normal or if treatment for SIADH does not resolve her sx she can try increasing Wellbutrin and taking Cytomel. 3. Dysthymia  -     buPROPion XL (WELLBUTRIN XL) 150 mg tablet; Take 1 Tab by mouth every morning. sent to pharmacy. Wellbutrin  mg has been helping her but she could be seeing more benefit from it, so I increased her dose to 150 mg every day. 4. Obesity, morbid ([address]  I commended the pt on their healthy lifestyle choices and routines. Explained that they should be walking 5,000 steps daily to maintain conditioning and weight and increase to at least 10,000 steps to work on losing weight. 5. Polyuria  -     CORTISOL, AM; Future  -     OSMOLALITY, SERUM/PLASMA; Future  -     ANTIDIURETIC HORMONE (ADH) PROFILE; Future  -     METABOLIC PANEL, COMPREHENSIVE; Future  -     URINALYSIS W/ REFLEX CULTURE; Future  -     SODIUM, UR, RANDOM; Future  It is good that her fluid is coming down with Bumex but we do need to ensure that her potassium is not coming down too much at the same time. Labs drawn in office today. 6. Polydipsia  -     CORTISOL, AM; Future  -     OSMOLALITY, SERUM/PLASMA; Future  -     ANTIDIURETIC HORMONE (ADH) PROFILE; Future  -     METABOLIC PANEL, COMPREHENSIVE; Future  -     SODIUM, UR, RANDOM; Future   Her sx sound like SIADH so I ordered labs to evaluate for this. She needs to follow up with endocrinology and does have an appt coming up to establish care with them. She should wait to see her lab results about SIADH before making medication changes. This plan was reviewed with the patient and patient agrees. All questions were answered. This scribe documentation was reviewed by me and accurately reflects the examination and decisions made by me. An electronic signature was used to authenticate this note.   -- Taylor Ceja

## 2021-02-02 DIAGNOSIS — R06.02 SHORTNESS OF BREATH: Primary | ICD-10-CM

## 2021-02-03 ENCOUNTER — HOSPITAL ENCOUNTER (OUTPATIENT)
Dept: GENERAL RADIOLOGY | Age: 31
Discharge: HOME | End: 2021-02-03
Payer: COMMERCIAL

## 2021-02-03 DIAGNOSIS — R06.02 SHORTNESS OF BREATH: ICD-10-CM

## 2021-02-03 PROCEDURE — 71046 X-RAY EXAM CHEST 2 VIEWS: CPT

## 2021-02-07 LAB
ALBUMIN SERPL-MCNC: 4.3 G/DL (ref 3.5–5)
ALBUMIN/GLOB SERPL: 1.2 {RATIO} (ref 1.1–2.2)
ALP SERPL-CCNC: 97 U/L (ref 45–117)
ALT SERPL-CCNC: 22 U/L (ref 12–78)
ANION GAP SERPL CALC-SCNC: 6 MMOL/L (ref 5–15)
APPEARANCE UR: CLEAR
AST SERPL-CCNC: 11 U/L (ref 15–37)
BACTERIA URNS QL MICRO: NEGATIVE /HPF
BILIRUB SERPL-MCNC: 0.5 MG/DL (ref 0.2–1)
BILIRUB UR QL: NEGATIVE
BUN SERPL-MCNC: 8 MG/DL (ref 6–20)
BUN/CREAT SERPL: 11 (ref 12–20)
CALCIUM SERPL-MCNC: 9.7 MG/DL (ref 8.5–10.1)
CHLORIDE SERPL-SCNC: 102 MMOL/L (ref 97–108)
CO2 SERPL-SCNC: 27 MMOL/L (ref 21–32)
COLOR UR: NORMAL
CORTIS AM PEAK SERPL-MCNC: 11.6 UG/DL (ref 4.3–22.45)
CREAT SERPL-MCNC: 0.72 MG/DL (ref 0.55–1.02)
EPITH CASTS URNS QL MICRO: NORMAL /LPF
GLOBULIN SER CALC-MCNC: 3.7 G/DL (ref 2–4)
GLUCOSE SERPL-MCNC: 80 MG/DL (ref 65–100)
GLUCOSE UR STRIP.AUTO-MCNC: NEGATIVE MG/DL
HGB UR QL STRIP: NEGATIVE
HYALINE CASTS URNS QL MICRO: NORMAL /LPF (ref 0–5)
KETONES UR QL STRIP.AUTO: NEGATIVE MG/DL
LEUKOCYTE ESTERASE UR QL STRIP.AUTO: NEGATIVE
NITRITE UR QL STRIP.AUTO: NEGATIVE
OSMOLALITY SERPL: 280 MOSMOL/KG (ref 275–295)
OSMOLALITY SERPL: 284 MOSM/KG H2O
PH UR STRIP: 5.5 [PH] (ref 5–8)
POTASSIUM SERPL-SCNC: 4 MMOL/L (ref 3.5–5.1)
PROT SERPL-MCNC: 8 G/DL (ref 6.4–8.2)
PROT UR STRIP-MCNC: NEGATIVE MG/DL
RBC #/AREA URNS HPF: NORMAL /HPF (ref 0–5)
SODIUM SERPL-SCNC: 135 MMOL/L (ref 136–145)
SODIUM UR-SCNC: 64 MMOL/L
SP GR UR REFRACTOMETRY: 1.01 (ref 1–1.03)
UA: UC IF INDICATED,UAUC: NORMAL
UROBILINOGEN UR QL STRIP.AUTO: 0.2 EU/DL (ref 0.2–1)
VASOPRESSIN SERPL-MCNC: <0.8 PG/ML (ref 0–4.7)
WBC URNS QL MICRO: NORMAL /HPF (ref 0–4)

## 2021-02-11 NOTE — PROGRESS NOTES
Lisha, hope you are doing well. Your hormone test result came back in low normal range. You will need a further Hormonal work up and I am sure endocrinologist will do CT abdomen as well. Is wellbutrin working for you?

## 2021-03-01 DIAGNOSIS — M79.89 LEG SWELLING: Primary | ICD-10-CM

## 2021-03-01 RX ORDER — BUMETANIDE 1 MG/1
1 TABLET ORAL DAILY
Qty: 90 TAB | Refills: 0 | Status: SHIPPED | OUTPATIENT
Start: 2021-03-01 | End: 2021-05-30

## 2021-04-05 DIAGNOSIS — B00.1 RECURRENT COLD SORES: Primary | ICD-10-CM

## 2021-04-05 RX ORDER — VALACYCLOVIR HYDROCHLORIDE 500 MG/1
500 TABLET, FILM COATED ORAL 2 TIMES DAILY
Qty: 20 TAB | Refills: 0 | Status: SHIPPED | OUTPATIENT
Start: 2021-04-05 | End: 2022-06-28

## 2021-04-08 DIAGNOSIS — F34.1 DYSTHYMIA: ICD-10-CM

## 2021-04-08 RX ORDER — BUPROPION HYDROCHLORIDE 150 MG/1
150 TABLET ORAL
Qty: 30 TAB | Refills: 0 | Status: SHIPPED | OUTPATIENT
Start: 2021-04-08 | End: 2021-05-05

## 2021-05-14 ENCOUNTER — VIRTUAL VISIT (OUTPATIENT)
Dept: ENDOCRINOLOGY | Age: 31
End: 2021-05-14
Payer: COMMERCIAL

## 2021-05-14 DIAGNOSIS — R53.83 FATIGUE, UNSPECIFIED TYPE: ICD-10-CM

## 2021-05-14 DIAGNOSIS — E03.9 ACQUIRED HYPOTHYROIDISM: Primary | ICD-10-CM

## 2021-05-14 PROCEDURE — 99204 OFFICE O/P NEW MOD 45 MIN: CPT | Performed by: INTERNAL MEDICINE

## 2021-05-14 RX ORDER — LIOTHYRONINE SODIUM 5 UG/1
5 TABLET ORAL DAILY
COMMUNITY
End: 2021-05-14 | Stop reason: SDUPTHER

## 2021-05-14 RX ORDER — LEVOTHYROXINE SODIUM 100 UG/1
100 TABLET ORAL
Qty: 90 TAB | Refills: 3 | Status: SHIPPED | OUTPATIENT
Start: 2021-05-14 | End: 2021-07-12 | Stop reason: ALTCHOICE

## 2021-05-14 RX ORDER — LIOTHYRONINE SODIUM 5 UG/1
5 TABLET ORAL DAILY
Qty: 90 TAB | Refills: 3 | Status: SHIPPED | OUTPATIENT
Start: 2021-05-14 | End: 2021-07-12 | Stop reason: ALTCHOICE

## 2021-05-14 NOTE — PROGRESS NOTES
Lab Results   Component Value Date/Time    TSH 2.09 01/06/2021 03:13 PM    T3 Uptake 27 05/04/2020 01:20 PM    T4, Free 1.1 01/06/2021 03:13 PM    T4, Total 8.4 05/04/2020 01:20 PM

## 2021-05-14 NOTE — PROGRESS NOTES
Chief Complaint   Patient presents with    New Patient     Doxy: [phone]     Thyroid Problem    Other     History of Present Illness: Altagracia Ching is a 26 y.o. female with a past medical history significant for exercise-induced asthma, interstitial cystitis and hypothyroidism seen in referral from Kayren Cabot, MD for discussion related to hypothyroidism. Initial diagnosis of hypothyroidism was made 4-5 years ago. Was begun on levothyroxine initially. Started having bad anxiety so she was switched to levoxyl. In the past 2 months, liothyronine was added due to exhaustion despite normal labs. Describes days when she could not get out of bed- 1 year. At night, does not sleep- could be exhausted and when she goes to bed she cannot sleep. Takes liothyronine at 0800. At night, she gets restless leg syndrome, takes magnesium to help with this. Also helps with interstitial cystitis. Takes magnesium at night time. Muscle fatigue when she's dehydrated, does not have the energy- muscles feel drained. Has gotten to the point that she feels down, affects mood. No motivation to do anything. At night when she tries to go to sleep, cannot rest. Can lay there for hours. Doesn't fall asleep until [id number]. Never had issues with this until she got pregnant with son. Prior to that had no issues going to sleep. Wants to finish school before having another child. Does have racing thoughts. Wakes up in the middle of the night. Does not snore. Does not wake up feeling rested. Days she takes bumetanide does not have restless legs. Around the cycle legs bother her. Levoxyl has calmed down anxiety.     Past Medical History:   Diagnosis Date    Asthma     exercise / cold induced doesn't use inhaler    Hypothyroid     Interstitial cystitis      Past Surgical History:   Procedure Laterality Date    COLONOSCOPY N/A 1/18/2018    COLONOSCOPY performed by Cait Gross MD at [facility] AMBULATORY OR    HX CHOLECYSTECTOMY       Current Outpatient Medications   Medication Sig    liothyronine (CYTOMEL) 5 mcg tablet Take 5 mcg by mouth daily. 1/2 tab daily    buPROPion XL (WELLBUTRIN XL) 150 mg tablet TAKE 1 TABLET BY MOUTH EVERY DAY IN THE MORNING    bumetanide (BUMEX) 1 mg tablet Take 1 Tab by mouth daily for 90 days.  LevoxyL 112 mcg tablet TAKE 1 TABLET BY MOUTH EVERY MORNING BEFORE BREAKFAST    multivitamin (ONE A DAY) tablet Take 1 Tab by mouth daily.  cetirizine (ZYRTEC) 10 mg tablet TAKE 1 TABLET BY MOUTH EVERY DAY    cholecalciferol (VITAMIN D3) 2,000 unit cap capsule Take 2,000 Units by mouth daily. Indications: Vitamin D Deficiency (High Dose Therapy) (Patient taking differently: Take 2,000 Units by mouth daily. Indications: vitamin D deficiency (high dose therapy))    cholestyramine-sucrose (QUESTRAN) 4 gram powder Take  by mouth as needed.  albuterol (PROVENTIL HFA, VENTOLIN HFA, PROAIR HFA) 90 mcg/actuation inhaler Take 2 Puffs by inhalation every four (4) hours as needed for Wheezing. (Patient taking differently: Take 2 Puffs by inhalation every four (4) hours as needed for Wheezing.)     No current facility-administered medications for this visit.         Allergies   Allergen Reactions    Shellfish Derived Swelling     Wheezing    Banana Other (comments)     Abdominal pain      Keflex [Cephalexin] Rash     Family History   Problem Relation Age of Onset    Hypertension Mother     Diabetes Mother     Heart Disease Mother         Arrythmia    Diabetes Father     Hypertension Father     Diabetes Sister         Borderline    Cancer Brother         Brain Tumor    Other Maternal Grandmother         during childbirth    No Known Problems Maternal Grandfather     Heart Failure Paternal Grandmother     Diabetes Paternal Grandmother     Hypertension Paternal Grandmother     No Known Problems Paternal Grandfather     Thyroid Disease Maternal Aunt     No Known Problems Sister     Other Brother         eye issues  Thyroid Cancer Maternal Aunt     Cancer Maternal Aunt         Thyroid       Social Hx:     Review of Systems:  - Constitutional Symptoms: fatigue to the point that she cannot get out of bed  - Eyes: no blurry vision or double vision  - Cardiovascular: no chest pain or palpitations  - Respiratory: shortness of breath on some days  - Gastrointestinal: no dysphagia or abdominal pain  - Musculoskeletal: endorses severe muscle fatigue some days  - Integumentary: no rashes  - Neurological: no numbness, tingling, or headaches  - Psychiatric: no depression or anxiety  - Endocrine: no heat or cold intolerance, no polyuria or polydipsia    - Physical Examination:  - - GENERAL: NCAT, Appears well nourished   - EYES: EOMI, non-icteric, no proptosis   - Ear/Nose/Throat: NCAT, no visible inflammation or masses   - CARDIOVASCULAR: no cyanosis, no visible JVD   - RESPIRATORY: respiratory effort normal without any distress or labored breathing   - MUSCULOSKELETAL: Normal ROM of neck and upper extremities observed   - SKIN: No rash on face  - NEUROLOGIC:  No facial asymmetry (Cranial nerve 7 motor function), No gaze palsy   - PSYCHIATRIC: Normal affect, Normal insight and judgement     Data Reviewed:     Results for John Singh (MRN [id number]) as of 5/14/2021 11:56   Ref. Range 1/6/2021 15:13   T4, Free Latest Ref Range: 0.8 - 1.5 NG/DL 1.1   TSH Latest Ref Range: 0.36 - 3.74 uIU/mL 2.09   Results for John Singh (MRN [id number]) as of 5/14/2021 11:56   Ref. Range 1/28/2021 10:16   Cortisol, a.m. Latest Ref Range: 4.30 - 22.45 ug/dL 11.6     Results for John Singh (MRN [id number]) as of 5/14/2021 11:56   Ref. Range 6/27/2019 15:23 1/16/2020 11:26 1/6/2021 15:13 1/28/2021 10:16   Sodium Latest Ref Range: 136 - 145 mmol/L 140 139 136 135 (L)       Assessment/Plan:  This is a very pleasant 26-year-old female with past medical history significant for interstitial cystitis seen in referral from Jay Edgar MD for discussion related to fatigue. Will trial Cytomel 5 mcg and reduce Levoxyl to 100 mcg to see if this improves energy. I think a big portion of her symptoms is related to poor sleep and have placed a referral to sleep medicine. We discussed sleep hygiene. With respect to the antidiuretic hormone levels, these are notoriously unreliable. There is no evidence of large volumes of dilute urine and her serum sodium is not high. My suspicion for diabetes insipidus is extremely low. #Hypothyroidism, fatigue  -Increase Cytomel to 5 mcg, will reduce Levoxyl to 100 mcg for a total daily dose equivalent of 125 mcg of levothyroxine  -Repeat thyroid function tests in 8 weeks following this dose change  -Referral placed to sleep medicine for discussion regarding increased sleep latency  -Obtain iron panel    Copy sent Yelena Guadalupe MD      Return to care: July 15th at 10:10    Mica Ricci is a 26 y.o. female being evaluated by a Virtual Visit (video visit) encounter to address concerns as mentioned above. A caregiver was present when appropriate. Due to this being a TeleHealth encounter (During NGIRZ-37 public health emergency), evaluation of the following organ systems was limited:     Vitals/Constitutional/EENT/Resp/CV/GI//MS/Neuro/Skin/Heme-Lymph-Imm. Pursuant to the emergency declaration under the [address] [place] [address] authority and the [employer] Act, this Virtual Visit was conducted with patient's (and/or legal guardian's) consent, to reduce the risk of exposure to COVID-19 and provide necessary medical care. Services were provided through a video synchronous discussion virtually to substitute for in-person encounter. --Harsha Colin MD on 5/14/2021 at 11:48 AM    An electronic signature was used to authenticate this note.

## 2021-07-07 DIAGNOSIS — R53.83 FATIGUE, UNSPECIFIED TYPE: ICD-10-CM

## 2021-07-07 DIAGNOSIS — E03.9 ACQUIRED HYPOTHYROIDISM: ICD-10-CM

## 2021-07-12 ENCOUNTER — VIRTUAL VISIT (OUTPATIENT)
Dept: ENDOCRINOLOGY | Age: 31
End: 2021-07-12
Payer: COMMERCIAL

## 2021-07-12 DIAGNOSIS — E03.9 ACQUIRED HYPOTHYROIDISM: Primary | ICD-10-CM

## 2021-07-12 DIAGNOSIS — R53.83 FATIGUE, UNSPECIFIED TYPE: ICD-10-CM

## 2021-07-12 PROCEDURE — 99214 OFFICE O/P EST MOD 30 MIN: CPT | Performed by: INTERNAL MEDICINE

## 2021-07-12 RX ORDER — LEVOTHYROXINE SODIUM 112 UG/1
TABLET ORAL
COMMUNITY
Start: 2021-07-10 | End: 2021-08-11 | Stop reason: ALTCHOICE

## 2021-07-12 NOTE — PROGRESS NOTES
Chief Complaint   Patient presents with    Thyroid Problem     No recent labs    Follow-up     doxy: [phone]     Other     [facility]     History of Present Illness: Radha Monique is a 26 y.o. female with a past medical history significant for exercise-induced asthma, interstitial cystitis and hypothyroidism seen in referral from Harini Guillory MD for discussion related to hypothyroidism. Previously:  Initial diagnosis of hypothyroidism was made 4-5 years ago. Was begun on levothyroxine initially. Started having bad anxiety so she was switched to levoxyl. In the past 2 months, liothyronine was added due to exhaustion despite normal labs. Describes days when she could not get out of bed- 1 year. At night, does not sleep- could be exhausted and when she goes to bed she cannot sleep. Takes liothyronine at 0800. At night, she gets restless leg syndrome, takes magnesium to help with this. Also helps with interstitial cystitis. Takes magnesium at night time. Muscle fatigue when she's dehydrated, does not have the energy- muscles feel drained. Has gotten to the point that she feels down, affects mood. No motivation to do anything. At night when she tries to go to sleep, cannot rest. Can lay there for hours. Doesn't fall asleep until [id number]. Never had issues with this until she got pregnant with son. Prior to that had no issues going to sleep. Wants to finish school before having another child. Does have racing thoughts. Wakes up in the middle of the night. Does not snore. Does not wake up feeling rested. Days she takes bumetanide does not have restless legs. Around the cycle legs bother her. Levoxyl has calmed down anxiety. 07/12/2021: Discontinued levoxyl and liothyronine dut to sandpaper rash on chest, face, eyes were \"puffy\" Did not itch. Felt so much better on liothyronine \"like night and day\" was getting back into a normal sleep routine. Since she's hasn't taken it hasn't slept at all.  Felt awful on 100mcg, had to go to [facility]. Aches and pains/exhaustion had resolved. Was able to get up and be productive, do stuff. Was also feeling anxious with the liothyronine. Was feeling anxious on generic levoxyl, brand stopped it. Current Outpatient Medications   Medication Sig    LevoxyL 112 mcg tablet     multivitamin (ONE A DAY) tablet Take 1 Tab by mouth daily.  cetirizine (ZYRTEC) 10 mg tablet TAKE 1 TABLET BY MOUTH EVERY DAY    cholecalciferol (VITAMIN D3) 2,000 unit cap capsule Take 2,000 Units by mouth daily. Indications: Vitamin D Deficiency (High Dose Therapy) (Patient taking differently: Take 2,000 Units by mouth daily. Indications: vitamin D deficiency (high dose therapy))    cholestyramine-sucrose (QUESTRAN) 4 gram powder Take  by mouth as needed.  albuterol (PROVENTIL HFA, VENTOLIN HFA, PROAIR HFA) 90 mcg/actuation inhaler Take 2 Puffs by inhalation every four (4) hours as needed for Wheezing. (Patient taking differently: Take 2 Puffs by inhalation every four (4) hours as needed for Wheezing.)     No current facility-administered medications for this visit.        Social Hx: alejandra, children    Review of Systems:  - Constitutional Symptoms: fatigue to the point that she cannot get out of bed had resolved on cytomel  - Eyes: no blurry vision or double vision  - Cardiovascular: no chest pain or palpitations  - Respiratory: shortness of breath on some days  - Gastrointestinal: no dysphagia or abdominal pain  - Musculoskeletal: endorses severe muscle fatigue some days  - Integumentary: rash as per HPI  - Neurological: no numbness, tingling, or headaches  - Psychiatric: no depression or anxiety  - Endocrine: no heat or cold intolerance, no polyuria or polydipsia    - Physical Examination:  - - GENERAL: NCAT, Appears well nourished   - EYES: EOMI, non-icteric, no proptosis   - Ear/Nose/Throat: NCAT, no visible inflammation or masses   - CARDIOVASCULAR: no cyanosis, no visible JVD   - RESPIRATORY: respiratory effort normal without any distress or labored breathing   - MUSCULOSKELETAL: Normal ROM of neck and upper extremities observed   - SKIN: No rash on face  - NEUROLOGIC:  No facial asymmetry (Cranial nerve 7 motor function), No gaze palsy   - PSYCHIATRIC: Normal affect, Normal insight and judgement     Data Reviewed:     Results for Debra Parents (MRN [id number]) as of 5/14/2021 11:56   Ref. Range 1/6/2021 15:13   T4, Free Latest Ref Range: 0.8 - 1.5 NG/DL 1.1   TSH Latest Ref Range: 0.36 - 3.74 uIU/mL 2.09   Results for Debra Parents (MRN [id number]) as of 5/14/2021 11:56   Ref. Range 1/28/2021 10:16   Cortisol, a.m. Latest Ref Range: 4.30 - 22.45 ug/dL 11.6     Results for Debra Parents (MRN [id number]) as of 5/14/2021 11:56   Ref. Range 6/27/2019 15:23 1/16/2020 11:26 1/6/2021 15:13 1/28/2021 10:16   Sodium Latest Ref Range: 136 - 145 mmol/L 140 139 136 135 (L)       Assessment/Plan: This is a very pleasant 27-year-old female with past medical history significant for interstitial cystitis seen infollow up for discussion related to fatigue. Previously trialed Cytomel 5 mcg and Levoxyl to 100 mcg to see if this improved energy and it did significantly, pt stated it was \"like night and day\" and that she could \"finally sleep\". Developed a rash around the time of cytomel use so discontinued it and has been feeling very poor again for the past few days. We discussed the fact that there is no cytomel alternative unfortunately. Will reassess TSH/Free T4- plan to either cautiously retrial cytomel (made pt aware that if she experiences shortness or breath/swelling as occurred previously, will need to call the ambulance, she does not have an epi-pen at home), or switch to 125mcg of levoxyl if TSH/Free T4 normal now.      #Hypothyroidism, fatigue  -felt much improved on cytomel 5mcg and levoxyl 100mcg- repeat TSH and Free T4 on these levels  -consider cautious cytomel re-trial as it is very unclear that sx were not related to seasonal allergies  -Referral placed to sleep medicine for discussion regarding increased sleep latency  -Obtain iron panel    Copy sent Kt Eddy MD      Return to care: pending labs    Jaqui Bruno is a 26 y.o. female being evaluated by a Virtual Visit (video visit) encounter to address concerns as mentioned above. A caregiver was present when appropriate. Due to this being a TeleHealth encounter (During JIWUX-19 public health emergency), evaluation of the following organ systems was limited:     Vitals/Constitutional/EENT/Resp/CV/GI//MS/Neuro/Skin/Heme-Lymph-Imm. Pursuant to the emergency declaration under the [address] [place] [address], [address] [place] [address] authority and the [employer] Act, this Virtual Visit was conducted with patient's (and/or legal guardian's) consent, to reduce the risk of exposure to COVID-19 and provide necessary medical care. Services were provided through a video synchronous discussion virtually to substitute for in-person encounter. --Chichi Restrepo MD on 7/12/2021 at 11:48 AM    An electronic signature was used to authenticate this note.

## 2021-07-14 LAB
IRON SATN MFR SERPL: 32 % (ref 15–55)
IRON SERPL-MCNC: 82 UG/DL (ref 27–159)
T4 FREE SERPL-MCNC: 1.24 NG/DL (ref 0.82–1.77)
TIBC SERPL-MCNC: 259 UG/DL (ref 250–450)
TSH SERPL DL<=0.005 MIU/L-ACNC: 1.94 UIU/ML (ref 0.45–4.5)
UIBC SERPL-MCNC: 177 UG/DL (ref 131–425)

## 2021-07-16 NOTE — LETTER
7/16/2021    Ms. Tamra [facility] [id number]      Dear Denny Tariq:    Please find your most recent results below. Resulted Orders   TSH 3RD GENERATION   Result Value Ref Range    TSH 1.940 0.450 - 4.500 uIU/mL    Narrative    Performed at:  [address]  [id number]  : Fela Gaines MD, Phone:  [phone]   T4, FREE   Result Value Ref Range    T4, Free 1.24 0.82 - 1.77 ng/dL    Narrative    Performed at:  [address]  [id number]  : Fela Gaines MD, Phone:  [phone]   IRON PROFILE   Result Value Ref Range    TIBC 259 250 - 450 ug/dL    UIBC 177 131 - 425 ug/dL    Iron 82 27 - 159 ug/dL    Iron % saturation 32 15 - 55 %    Narrative    Performed at:  [address]  [id number]  : Fela Gaines MD, Phone:  [phone]       RECOMMENDATIONS:    Juve Howell,    Your thyroid function tests are normal on the equivalent of levothyroxine 125mcg. You should take either 125mcg of levoxyl or 100mcg of levoxyl and 5mcg of liothyronine. Please let me know if you cannot tolerate liothyronine and I will send in a prescription for levoxyl 125mcg.      Otherwise, your iron levels are normal.     Please call me if you have any questions: [phone]    Sincerely,      Rajesh Traylor MD

## 2021-08-11 RX ORDER — LEVOTHYROXINE SODIUM 112 UG/1
112 TABLET ORAL
Qty: 90 TABLET | Refills: 3 | Status: SHIPPED | OUTPATIENT
Start: 2021-08-11 | End: 2021-10-26

## 2021-10-26 ENCOUNTER — VIRTUAL VISIT (OUTPATIENT)
Dept: INTERNAL MEDICINE CLINIC | Age: 31
End: 2021-10-26
Payer: COMMERCIAL

## 2021-10-26 DIAGNOSIS — M54.41 ACUTE BILATERAL LOW BACK PAIN WITH BILATERAL SCIATICA: Primary | ICD-10-CM

## 2021-10-26 DIAGNOSIS — M54.42 ACUTE BILATERAL LOW BACK PAIN WITH BILATERAL SCIATICA: Primary | ICD-10-CM

## 2021-10-26 PROCEDURE — 99213 OFFICE O/P EST LOW 20 MIN: CPT | Performed by: NURSE PRACTITIONER

## 2021-10-26 RX ORDER — METHYLPREDNISOLONE 4 MG/1
TABLET ORAL
Qty: 1 DOSE PACK | Refills: 0 | Status: SHIPPED | OUTPATIENT
Start: 2021-10-26 | End: 2021-11-23 | Stop reason: ALTCHOICE

## 2021-10-26 RX ORDER — LEVOTHYROXINE SODIUM 112 UG/1
TABLET ORAL
COMMUNITY
Start: 2021-08-12 | End: 2022-06-03 | Stop reason: SDUPTHER

## 2021-10-26 NOTE — PROGRESS NOTES
Celia Alvarado is a 33 y.o. female who was seen by synchronous (real-time) audio-video technology on 10/26/2021 for Back Pain and Thyroid Problem        Assessment & Plan:   Diagnoses and all orders for this visit:    1. Acute bilateral low back pain with bilateral sciatica  Will order  -     methylPREDNISolone (MEDROL DOSEPACK) 4 mg tablet; As per dose pack instructions    Heat to affected area for comfort. Stretching exercises as tolerated. Patient encouraged to call or return to office if symptoms do not improve or worsen, for further evaluation and possible consideration of further imaging/ PT. Reviewed medications and side effects in detail. Reviewed plan of care with patient who acknowledges understanding and agrees. Subjective:     Patient presents today with complaints of back pain. The patient states she has had pain to her lower back over the last 2.5-3 weeks. She describes aching lower back pain, with intermittent sharp, shooting pains down both legs. Patient states she awakened to pain at its onset. She cannot recall any specific injury prior to onset at this time, but does have a two year old child whom she lifts and plays in the floor with. Pain is exacerbated with twisting movements and also with sitting or standing for longer periods. Pain is relieved by lying down flat. No difficulty voiding bowels/ bladder. Patient was seen at [facility] about 2 weeks ago. She says x-ray was completed and provider noted nothing alarming on the imaging. Patient was prescribed diclofenac. This did not seem to help much. Patient has also been applying heat and doing gentle stretching for comfort. Prior to Admission medications    Medication Sig Start Date End Date Taking? Authorizing Provider   levothyroxine (LevoxyL) 112 mcg tablet  8/12/21  Yes Provider, Historical   multivitamin (ONE A DAY) tablet Take 1 Tab by mouth daily.    Yes Provider, Historical   cetirizine (ZYRTEC) 10 mg tablet TAKE 1 TABLET BY MOUTH EVERY DAY 11/24/19  Yes Darlyn Dotson MD   cholecalciferol (VITAMIN D3) 2,000 unit cap capsule Take 2,000 Units by mouth daily. Indications: Vitamin D Deficiency (High Dose Therapy)  Patient taking differently: Take 2,000 Units by mouth daily. Indications: vitamin D deficiency (high dose therapy) 6/27/19  Yes Darlyn Dotson MD   cholestyramine-sucrose (QUESTRAN) 4 gram powder Take  by mouth as needed. Yes Provider, Historical   albuterol (PROVENTIL HFA, VENTOLIN HFA, PROAIR HFA) 90 mcg/actuation inhaler Take 2 Puffs by inhalation every four (4) hours as needed for Wheezing. Patient taking differently: Take 2 Puffs by inhalation every four (4) hours as needed for Wheezing. 10/22/17  Yes Debra Riddle MD   MAGNESIUM PO magnesium 500 mg tablet   Take by oral route. Provider, Historical   levothyroxine (SYNTHROID) 112 mcg tablet Take 1 Tablet by mouth Daily (before breakfast).   Patient not taking: Reported on 10/26/2021 8/11/21   Anusha Greenwood MD         ROS    Objective:     Patient-Reported Vitals 10/26/2021   Patient-Reported Weight -   Patient-Reported Height -   Patient-Reported Systolic  -   Patient-Reported Diastolic -   Patient-Reported LMP 10/02/2021        [INSTRUCTIONS:  \"[x]\" Indicates a positive item  \"[]\" Indicates a negative item  -- DELETE ALL ITEMS NOT EXAMINED]    Constitutional: [x] Appears well-developed and well-nourished [x] No apparent distress      [] Abnormal -     Mental status: [x] Alert and awake  [x] Oriented to person/place/time [x] Able to follow commands    [] Abnormal -     Eyes:   EOM    [x]  Normal    [] Abnormal -   Sclera  [x]  Normal    [] Abnormal -          Discharge [x]  None visible   [] Abnormal -     HENT: [x] Normocephalic, atraumatic  [] Abnormal -   [x] Mouth/Throat: Mucous membranes are moist    External Ears [x] Normal  [] Abnormal -    Neck: [x] No visualized mass [] Abnormal -     Pulmonary/Chest: [x] Respiratory effort normal   [x] No visualized signs of difficulty breathing or respiratory distress        [] Abnormal -      Musculoskeletal:   [x] Normal gait with no signs of ataxia         [x] Normal range of motion of neck        [] Abnormal -     Neurological:        [x] No Facial Asymmetry (Cranial nerve 7 motor function) (limited exam due to video visit)          [x] No gaze palsy        [] Abnormal -          Skin:        [x] No significant exanthematous lesions or discoloration noted on facial skin         [] Abnormal -            Psychiatric:       [x] Normal Affect [] Abnormal -        [x] No Hallucinations    Other pertinent observable physical exam findings:-        We discussed the expected course, resolution and complications of the diagnosis(es) in detail. Medication risks, benefits, costs, interactions, and alternatives were discussed as indicated. I advised her to contact the office if her condition worsens, changes or fails to improve as anticipated. She expressed understanding with the diagnosis(es) and plan. Margajose Alvarado, was evaluated through a synchronous (real-time) audio-video encounter. The patient (or guardian if applicable) is aware that this is a billable service. Verbal consent to proceed has been obtained within the past 12 months. The visit was conducted pursuant to the emergency declaration under the [facility] [address] authority and the [employer] and [employer] Act. Patient identification was verified, and a caregiver was present when appropriate. The patient was located in a state where the provider was credentialed to provide care.       Fernanda Shirley NP

## 2021-10-26 NOTE — PROGRESS NOTES
ADVISED PATIENT OF THE FOLLOWING HEALTH MAINTAINCE DUE  Health Maintenance Due   Topic Date Due    COVID-19 Vaccine (1) Never done    Cervical cancer screen  Never done      Chief Complaint   Patient presents with    Back Pain    Thyroid Problem       1. Have you been to the ER, urgent care clinic since your last visit? Hospitalized since your last visit? No    2. Have you seen or consulted any other health care providers outside of the [address] [place] [address] since your last visit? Include any DEXA scan, mammography  or colon screening. No    3. Do you have an Advance Directive on file? no    4. Do you have a DNR on file? no    Patient is accompanied by self I have received verbal consent from Ching Berg to discuss any/all medical information while they are present in the room.       Advance Care Planning 6/19/2017   Confirm Advance Directive None         [id number] [address] [address]  Phone: [phone] Fax: [phone] [facility] #[id number] [address] AT [address] [place] [address]  Phone: [phone] Fax: [phone]    [facility] [address]  [address] [place] [address]  Phone: [phone] Fax: [phone]

## 2021-11-23 ENCOUNTER — VIRTUAL VISIT (OUTPATIENT)
Dept: PRIMARY CARE CLINIC | Age: 31
End: 2021-11-23
Payer: COMMERCIAL

## 2021-11-23 DIAGNOSIS — L73.9 FOLLICULITIS: Primary | ICD-10-CM

## 2021-11-23 DIAGNOSIS — E03.9 ACQUIRED HYPOTHYROIDISM: ICD-10-CM

## 2021-11-23 PROCEDURE — 99213 OFFICE O/P EST LOW 20 MIN: CPT | Performed by: INTERNAL MEDICINE

## 2021-11-23 NOTE — PROGRESS NOTES
Laura Barnard (: 1990) is a 33 y.o. female, established patient, here for evaluation of the following chief complaint(s):   Skin Problem     Written by Claudia Odonnell, as dictated by Dr. Valerie Valdovinos MD.      ASSESSMENT/PLAN:  Below is the assessment and plan developed based on review of pertinent history, labs, studies, and medications. 1. Folliculitis  Prescribed Cortisporin 1% ointment, apply to affected are BID as prescribed. Potential side effects were discussed. Advised her to use OTC Hibiclens cleanser as directed on the bottle. -     bacitracin-neomycin-polymyxin b-hydrocortisone (CORTISPORIN) 1 % ointment; Apply  to affected area two (2) times a day., Normal, Disp-15 g, R-0 sent to pharmacy. 2. Acquired hypothyroidism  Continue taking levothyroxine 112 mcg and Cytomel 5 mg as prescribed. Followed by Dr. Chantell Robledo, Endocrinology. SUBJECTIVE/OBJECTIVE:  HPI    Patient presents today virtually c/o rash and white pus filled bumps on her nose. She notes the bumps are not pimples, they start under the rash and slowly rise to the top of the skin and then burst.     She takes levothyroxine 112 mcg and Cytomel 5 mg for hypothyroidism. She is followed by Dr. Chantell Robledo, Endocrinology. She completed a Pap smear this year and results were unremarkable. She has not received a COVID-19 vaccine but she is considering receiving one. Patient Active Problem List   Diagnosis Code    Chronic LBP M54.50, G89.29    Mild intermittent asthma with acute exacerbation J45.21    Environmental and seasonal allergies J30.89    H/O extrinsic asthma Z87.09    Obesity, morbid ([facility] [address] E66.01    Acquired hypothyroidism E03.9        Current Outpatient Medications on File Prior to Visit   Medication Sig Dispense Refill    MAGNESIUM PO magnesium 500 mg tablet   Take by oral route.       levothyroxine (LevoxyL) 112 mcg tablet       [DISCONTINUED] methylPREDNISolone (MEDROL DOSEPACK) 4 mg tablet As per dose pack instructions 1 Dose Pack 0    multivitamin (ONE A DAY) tablet Take 1 Tab by mouth daily.  cetirizine (ZYRTEC) 10 mg tablet TAKE 1 TABLET BY MOUTH EVERY DAY 30 Tab 0    cholecalciferol (VITAMIN D3) 2,000 unit cap capsule Take 2,000 Units by mouth daily. Indications: Vitamin D Deficiency (High Dose Therapy) (Patient taking differently: Take 2,000 Units by mouth daily. Indications: vitamin D deficiency (high dose therapy)) 90 Cap 1    cholestyramine-sucrose (QUESTRAN) 4 gram powder Take  by mouth as needed.  albuterol (PROVENTIL HFA, VENTOLIN HFA, PROAIR HFA) 90 mcg/actuation inhaler Take 2 Puffs by inhalation every four (4) hours as needed for Wheezing. (Patient taking differently: Take 2 Puffs by inhalation every four (4) hours as needed for Wheezing.) 1 Inhaler 0     No current facility-administered medications on file prior to visit. Allergies   Allergen Reactions    Amoxicillin-Pot Clavulanate Rash    Banana Other (comments)     Abdominal pain    Other reaction(s): Other, Vomiting  Other reaction(s):  Other (comments), Vomiting  Abdominal pain      Shellfish Containing Products Hives and Swelling     Other reaction(s): Facial Swelling  Wheezing    Shellfish Derived Swelling, Hives and Rash     Wheezing  Other reaction(s): Facial Swelling    Cephalexin Rash    Liothyronine Hives, Swelling and Anxiety       Past Medical History:   Diagnosis Date    Asthma     exercise / cold induced doesn't use inhaler    Hypothyroid     Interstitial cystitis        Past Surgical History:   Procedure Laterality Date    COLONOSCOPY N/A 1/18/2018    COL[facility] performed by [facility]il[facility] at [facility]    HX CHOLECYSTECTOMY         Family History   Problem Relation Age of Onset    Hypertension Mother     Diabetes Mother     Heart Disease Mother         Arrythmia    Diabetes Father     Hypertension Father     Diabetes Sister         Borderline    Cancer Brother         Brain Tumor    Other Maternal Grandmother         during childbirth    No Known Problems Maternal Grandfather     Heart Failure Paternal Grandmother     Diabetes Paternal Grandmother     Hypertension Paternal Grandmother     No Known Problems Paternal Grandfather     Thyroid Disease Maternal Aunt     No Known Problems Sister     Other Brother         eye issues    Thyroid Cancer Maternal Aunt     Cancer Maternal Aunt         Thyroid       Social History     Socioeconomic History    Marital status: SINGLE     Spouse name: Not on file    Number of children: Not on file    Years of education: Not on file    Highest education level: Not on file   Occupational History    Not on file   Tobacco Use    Smoking status: Never Smoker    Smokeless tobacco: Never Used   Vaping Use    Vaping Use: Never used   Substance and Sexual Activity    Alcohol use: No    Drug use: No    Sexual activity: Yes     Partners: Male   Other Topics Concern    Not on file   Social History Narrative    Not on file     Social Determinants of Health     Financial Resource Strain:     Difficulty of Paying Living Expenses: Not on file   Food Insecurity:     Worried About Running Out of Food in the Last Year: Not on file    Stefania of Food in the Last Year: Not on file   Transportation Needs:     Lack of Transportation (Medical): Not on file    Lack of Transportation (Non-Medical):  Not on file   Physical Activity:     Days of Exercise per Week: Not on file    Minutes of Exercise per Session: Not on file   Stress:     Feeling of Stress : Not on file   Social Connections:     Frequency of Communication with Friends and Family: Not on file    Frequency of Social Gatherings with Friends and Family: Not on file    Attends Hoahaoism Services: Not on file    Active Member of Clubs or Organizations: Not on file    Attends Club or Organization Meetings: Not on file    Marital Status: Not on file   Intimate Partner Violence:     Fear of Current or Ex-Partner: Not on file    Emotionally Abused: Not on file    Physically Abused: Not on file    Sexually Abused: Not on file   Housing Stability:     Unable to Pay for Housing in the Last Year: Not on file    Number of [place] in the Last Year: Not on file    Unstable Housing in the Last Year: Not on file       No visits with results within 3 Month(s) from this visit. Latest known visit with results is:   Virtual Visit on 07/12/2021   Component Date Value Ref Range Status    TSH 07/13/2021 1.940  0.450 - 4.500 uIU/mL Final    T4, Free 07/13/2021 1.24  0.82 - 1.77 ng/dL Final    TIBC 07/13/2021 259  250 - 450 ug/dL Final    UIBC 07/13/2021 177  131 - 425 ug/dL Final    Iron 07/13/2021 82  27 - 159 ug/dL Final    Iron % saturation 07/13/2021 32  15 - 55 % Final       Review of Systems   Constitutional: Negative for activity change, fatigue and unexpected weight change. HENT: Negative for congestion, hearing loss, rhinorrhea and sore throat. Eyes: Negative for discharge. Respiratory: Negative for cough, chest tightness and shortness of breath. Cardiovascular: Negative for leg swelling. Gastrointestinal: Negative for abdominal pain, constipation and diarrhea. Genitourinary: Negative for dysuria, flank pain, frequency and urgency. Musculoskeletal: Negative for arthralgias, back pain and myalgias. Skin: Positive for rash. Negative for color change. Neurological: Negative for dizziness, light-headedness and headaches. Psychiatric/Behavioral: Negative for dysphoric mood and sleep disturbance. The patient is not nervous/anxious.          Physical Exam    [INSTRUCTIONS:  \"[x]\" Indicates a positive item  \"[]\" Indicates a negative item  -- DELETE ALL ITEMS NOT EXAMINED]    Constitutional: [x] Appears well-developed and well-nourished [x] No apparent distress      [] Abnormal -     Mental status: [x] Alert and awake  [x] Oriented to person/place/time [x] Able to follow commands    [] Abnormal -     Eyes:   EOM    [x]  Normal    [] Abnormal -   Sclera  [x]  Normal    [] Abnormal -          Discharge [x]  None visible   [] Abnormal -     HENT: [x] Normocephalic, atraumatic  [] Abnormal -   [x] Mouth/Throat: Mucous membranes are moist    External Ears [x] Normal  [] Abnormal -    Neck: [x] No visualized mass [] Abnormal -     Pulmonary/Chest: [x] Respiratory effort normal   [x] No visualized signs of difficulty breathing or respiratory distress        [] Abnormal -      Musculoskeletal:   [x] Normal gait with no signs of ataxia         [x] Normal range of motion of neck        [] Abnormal -     Neurological:        [x] No Facial Asymmetry (Cranial nerve 7 motor function) (limited exam due to video visit)          [x] No gaze palsy        [] Abnormal -          Skin:        [x] No significant exanthematous lesions or discoloration noted on facial skin         [] Abnormal -            Psychiatric:       [x] Normal Affect [] Abnormal -        [x] No Hallucinations    Other pertinent observable physical exam findings:-        Angle Garcia, was evaluated through a synchronous (real-time) audio-video encounter. The patient (or guardian if applicable) is aware that this is a billable service. Verbal consent to proceed has been obtained within the past 12 months. The visit was conducted pursuant to the emergency declaration under the [address] [place] [address] authority and the [employer] General Act. Patient identification was verified, and a caregiver was present when appropriate. The patient was located in a state where the provider was credentialed to provide care. An electronic signature was used to authenticate this note.   -- Caitie Mchugh

## 2021-11-29 DIAGNOSIS — L73.9 FOLLICULITIS: Primary | ICD-10-CM

## 2021-11-29 RX ORDER — CLINDAMYCIN PHOSPHATE 10 MG/G
GEL TOPICAL 2 TIMES DAILY
Qty: 1 ML | Refills: 0 | Status: SHIPPED | OUTPATIENT
Start: 2021-11-29 | End: 2021-12-02 | Stop reason: ALTCHOICE

## 2021-12-02 DIAGNOSIS — L73.9 FOLLICULITIS: Primary | ICD-10-CM

## 2021-12-02 RX ORDER — CLINDAMYCIN PHOSPHATE 10 UG/ML
LOTION TOPICAL 2 TIMES DAILY
Qty: 1 EACH | Refills: 0 | Status: SHIPPED | OUTPATIENT
Start: 2021-12-02 | End: 2021-12-09

## 2021-12-09 ENCOUNTER — TELEPHONE (OUTPATIENT)
Dept: PRIMARY CARE CLINIC | Age: 31
End: 2021-12-09

## 2021-12-17 DIAGNOSIS — L73.9 FOLLICULITIS: Primary | ICD-10-CM

## 2021-12-17 RX ORDER — DOXYCYCLINE 100 MG/1
100 CAPSULE ORAL 2 TIMES DAILY
Qty: 20 CAPSULE | Refills: 0 | Status: SHIPPED | OUTPATIENT
Start: 2021-12-17 | End: 2021-12-27

## 2022-01-14 DIAGNOSIS — F34.1 DYSTHYMIA: ICD-10-CM

## 2022-01-15 RX ORDER — BUPROPION HYDROCHLORIDE 150 MG/1
TABLET ORAL
Qty: 90 TABLET | Refills: 0 | Status: SHIPPED | OUTPATIENT
Start: 2022-01-15 | End: 2022-06-16

## 2022-03-09 ENCOUNTER — OFFICE VISIT (OUTPATIENT)
Dept: PRIMARY CARE CLINIC | Age: 32
End: 2022-03-09
Payer: COMMERCIAL

## 2022-03-09 ENCOUNTER — HOSPITAL ENCOUNTER (OUTPATIENT)
Dept: GENERAL RADIOLOGY | Age: 32
Discharge: HOME | End: 2022-03-09
Attending: INTERNAL MEDICINE
Payer: COMMERCIAL

## 2022-03-09 VITALS
SYSTOLIC BLOOD PRESSURE: 116 MMHG | HEART RATE: 78 BPM | RESPIRATION RATE: 18 BRPM | DIASTOLIC BLOOD PRESSURE: 79 MMHG | WEIGHT: 235 LBS | BODY MASS INDEX: 43.24 KG/M2 | OXYGEN SATURATION: 99 % | HEIGHT: 62 IN | TEMPERATURE: 97.3 F

## 2022-03-09 DIAGNOSIS — G89.29 CHRONIC BILATERAL LOW BACK PAIN WITH BILATERAL SCIATICA: Primary | ICD-10-CM

## 2022-03-09 DIAGNOSIS — E03.9 ACQUIRED HYPOTHYROIDISM: ICD-10-CM

## 2022-03-09 DIAGNOSIS — G89.29 CHRONIC BILATERAL LOW BACK PAIN WITH BILATERAL SCIATICA: ICD-10-CM

## 2022-03-09 DIAGNOSIS — M54.42 CHRONIC BILATERAL LOW BACK PAIN WITH BILATERAL SCIATICA: ICD-10-CM

## 2022-03-09 DIAGNOSIS — M54.41 CHRONIC BILATERAL LOW BACK PAIN WITH BILATERAL SCIATICA: ICD-10-CM

## 2022-03-09 DIAGNOSIS — E66.01 MORBIDLY OBESE (HCC): ICD-10-CM

## 2022-03-09 DIAGNOSIS — R40.0 DAYTIME SLEEPINESS: ICD-10-CM

## 2022-03-09 DIAGNOSIS — Z72.820 SLEEP DEPRIVATION: ICD-10-CM

## 2022-03-09 DIAGNOSIS — M54.41 CHRONIC BILATERAL LOW BACK PAIN WITH BILATERAL SCIATICA: Primary | ICD-10-CM

## 2022-03-09 DIAGNOSIS — M54.42 CHRONIC BILATERAL LOW BACK PAIN WITH BILATERAL SCIATICA: Primary | ICD-10-CM

## 2022-03-09 PROCEDURE — 72100 X-RAY EXAM L-S SPINE 2/3 VWS: CPT

## 2022-03-09 PROCEDURE — 99214 OFFICE O/P EST MOD 30 MIN: CPT | Performed by: INTERNAL MEDICINE

## 2022-03-09 RX ORDER — LIOTHYRONINE SODIUM 5 UG/1
5 TABLET ORAL DAILY
COMMUNITY
Start: 2022-01-14 | End: 2022-06-16

## 2022-03-09 RX ORDER — CYCLOBENZAPRINE HCL 10 MG
10 TABLET ORAL
Qty: 15 TABLET | Refills: 0 | Status: SHIPPED | OUTPATIENT
Start: 2022-03-09 | End: 2022-03-24

## 2022-03-09 NOTE — PROGRESS NOTES
Tamica Hernandez (: 1990) is a 33 y.o. female, established patient, here for evaluation of the following chief complaint(s):  LOW BACK PAIN (since 2021)     Written by Jim Ball, as dictated by Dr. Nikos Person MD.      ASSESSMENT/PLAN:  Below is the assessment and plan developed based on review of pertinent history, physical exam, labs, studies, and medications. 1. Chronic bilateral low back pain with bilateral sciatica  I ordered an xray of her lumbar spine and provided her with a referral to PT. I also rx'd Flexeril 10 mg QHS PRN. Potential side effects were discussed. -     XR SPINE LUMB 2 OR 3 V; Future  -     cyclobenzaprine (FLEXERIL) 10 mg tablet; Take 1 Tablet by mouth nightly for 15 days. , Normal, Disp-15 Tablet, R-0 sent to pharmacy.   -     REFERRAL TO PHYSICAL THERAPY    2. Acquired hypothyroidism  Followed by Dr. Jannie Hernandez (endocrinology). Continue on liothyronine 5 mcg daily and levothyroxine 112 mcg daily. 3. Sleep deprivation  I provided her with a referral to Dr. Arsenio Cerda (sleep medicine) for a sleep study.  -     SLEEP MEDICINE REFERRAL    4. Daytime sleepiness  I provided her with a referral to Dr. Arsenio Cerda (sleep medicine) for a sleep study.  -     SLEEP MEDICINE REFERRAL    5. Morbidly obese ([address]  I provided her with a referral to Dr. Arsenio Cerda (sleep medicine) for a sleep study. Explained that she should be walking 5,000 steps daily to maintain conditioning and weight and increase to at least 10,000 steps to work on losing weight.    -     SLEEP MEDICINE REFERRAL    SUBJECTIVE/OBJECTIVE:  HPI   The patient presents today c/o low back pain. She helped her parents move in 2021 and two weeks later work up with severe low back pain. The pain was so severe that morning that she had to roll out of bed. Since then, she continues to have low back pain with occasional sharp, shooting pain down her bilateral legs.  Bending over and leaning to the right exacerbates her pain. She saw Sujatha Handley NP for this pain on 10/26/21 and was rx'd a Medrol dosepack which did not provide any improvement in pain. She has been doing home stretching exercises and heating pads. She is followed by Dr. Lashae Hernandez (endocrinology) for hypothyroidism. She is on liothyronine 5 mcg daily and levothyroxine 112 mcg daily. She notes trouble sleeping at night since dx of hypothyroidism. She does not wake up feeling refreshed and will feel very fatigued throughout the day. Her  has never noted that she snores at night. Patient Active Problem List   Diagnosis Code    Chronic LBP M54.50, G89.29    Mild intermittent asthma with acute exacerbation J45.21    Environmental and seasonal allergies J30.89    H/O extrinsic asthma Z87.09    Obesity, morbid ([facility] [address] E66.01    Acquired hypothyroidism E03.9        Current Outpatient Medications on File Prior to Visit   Medication Sig Dispense Refill    liothyronine (CYTOMEL) 5 mcg tablet Take 5 mcg by mouth daily.  buPROPion XL (WELLBUTRIN XL) 150 mg tablet TAKE 1 TABLET BY MOUTH EVERY DAY IN THE MORNING 90 Tablet 0    MAGNESIUM PO magnesium 500 mg tablet   Take by oral route.  levothyroxine (LevoxyL) 112 mcg tablet       multivitamin (ONE A DAY) tablet Take 1 Tab by mouth daily.  cetirizine (ZYRTEC) 10 mg tablet TAKE 1 TABLET BY MOUTH EVERY DAY 30 Tab 0    cholecalciferol (VITAMIN D3) 2,000 unit cap capsule Take 2,000 Units by mouth daily. Indications: Vitamin D Deficiency (High Dose Therapy) (Patient taking differently: Take 2,000 Units by mouth daily. Indications: vitamin D deficiency (high dose therapy)) 90 Cap 1    cholestyramine-sucrose (QUESTRAN) 4 gram powder Take  by mouth as needed.  albuterol (PROVENTIL HFA, VENTOLIN HFA, PROAIR HFA) 90 mcg/actuation inhaler Take 2 Puffs by inhalation every four (4) hours as needed for Wheezing.  (Patient taking differently: Take 2 Puffs by inhalation every four (4) hours as needed for Wheezing.) 1 Inhaler 0     No current facility-administered medications on file prior to visit. Allergies   Allergen Reactions    Amoxicillin-Pot Clavulanate Rash    Banana Other (comments)     Abdominal pain    Other reaction(s): Other, Vomiting  Other reaction(s):  Other (comments), Vomiting  Abdominal pain      Shellfish Containing Products Hives and Swelling     Other reaction(s): Facial Swelling  Wheezing    Shellfish Derived Swelling, Hives and Rash     Wheezing  Other reaction(s): Facial Swelling    Cephalexin Rash    Liothyronine Hives, Swelling and Anxiety       Past Medical History:   Diagnosis Date    Asthma     exercise / cold induced doesn't use inhaler    Hypothyroid     Interstitial cystitis        Past Surgical History:   Procedure Laterality Date    COLONOSCOPY N/A 1/18/2018    COL[facility] at [facility] AM[facility]ULAT[facility] OR    HX CHOLECYSTECTOMY         Family History   Problem Relation Age of Onset   Dara Spurling Hypertension Mother     Diabetes Mother     Heart Disease Mother         Arrythmia    Diabetes Father     Hypertension Father     Diabetes Sister         Borderline    Cancer Brother         Brain Tumor    Other Maternal Grandmother         during childbirth    No Known Problems Maternal Grandfather     Heart Failure Paternal Grandmother     Diabetes Paternal Grandmother     Hypertension Paternal Grandmother     No Known Problems Paternal Grandfather     Thyroid Disease Maternal Aunt     No Known Problems Sister     Other Brother         eye issues    Thyroid Cancer Maternal Aunt     Cancer Maternal Aunt         Thyroid       Social History     Socioeconomic History    Marital status: SINGLE     Spouse name: Not on file    Number of children: Not on file    Years of education: Not on file    Highest education level: Not on file   Occupational History    Not on file   Tobacco Use    Smoking status: Never Smoker    Smokeless tobacco: Never Used   Vaping Use    Vaping Use: Never used   Substance and Sexual Activity    Alcohol use: No    Drug use: No    Sexual activity: Yes     Partners: Male   Other Topics Concern    Not on file   Social History Narrative    Not on file       Abstract on 03/09/2022   Component Date Value Ref Range Status    Pap Smear, External 04/06/2021 nil   Final      Review of Systems   Constitutional: Positive for fatigue. Negative for activity change and unexpected weight change. HENT: Negative for congestion, hearing loss, rhinorrhea and sore throat. Eyes: Negative for discharge. Respiratory: Negative for cough, chest tightness and shortness of breath. Cardiovascular: Negative for leg swelling. Gastrointestinal: Negative for abdominal pain, constipation and diarrhea. Genitourinary: Negative for dysuria, flank pain, frequency and urgency. Musculoskeletal: Positive for back pain. Negative for arthralgias and myalgias. Skin: Negative for color change and rash. Neurological: Negative for dizziness, light-headedness and headaches. Psychiatric/Behavioral: Negative for dysphoric mood and sleep disturbance. The patient is not nervous/anxious. Visit Vitals  /79 (BP 1 Location: Right arm, BP Patient Position: Sitting)   Pulse 78   Temp 97.3 °F (36.3 °C) (Temporal)   Resp 18   Ht 5' 2\" (1.575 m)   Wt 235 lb (106.6 kg)   LMP 02/28/2022   SpO2 99%   Breastfeeding No   BMI 42.98 kg/m²      Physical Exam  Vitals and nursing note reviewed. Constitutional:       General: She is not in acute distress. Appearance: Normal appearance. She is well-developed. She is not diaphoretic. HENT:      Right Ear: External ear normal.      Left Ear: External ear normal.   Eyes:      General: No scleral icterus. Right eye: No discharge. Left eye: No discharge. Extraocular Movements: Extraocular movements intact.       Conjunctiva/sclera: Conjunctivae normal.   Cardiovascular:      Rate and Rhythm: Normal rate and regular rhythm. Pulmonary:      Effort: Pulmonary effort is normal.      Breath sounds: Normal breath sounds. No wheezing. Abdominal:      General: Bowel sounds are normal.      Palpations: Abdomen is soft. Tenderness: There is no abdominal tenderness. Musculoskeletal:      Cervical back: Normal range of motion and neck supple. Lymphadenopathy:      Cervical: No cervical adenopathy. Neurological:      Mental Status: She is alert and oriented to person, place, and time. Psychiatric:         Mood and Affect: Mood and affect normal.         An electronic signature was used to authenticate this note.   -- Liliana Farmer

## 2022-03-10 NOTE — PROGRESS NOTES
Results reviewed. Release via [employer], your X-ray report is back and I am little surprised to see no Lumbar spine arthritis or disc problem but they mentioned severe constipation. Have you not been moving bowel daily?

## 2022-03-11 ENCOUNTER — PATIENT MESSAGE (OUTPATIENT)
Dept: SLEEP MEDICINE | Age: 32
End: 2022-03-11

## 2022-03-18 PROBLEM — E03.9 ACQUIRED HYPOTHYROIDISM: Status: ACTIVE | Noted: 2018-08-13

## 2022-03-19 PROBLEM — E66.01 OBESITY, MORBID (HCC): Status: ACTIVE | Noted: 2017-12-29

## 2022-03-19 PROBLEM — Z87.09 H/O EXTRINSIC ASTHMA: Status: ACTIVE | Noted: 2017-10-24

## 2022-03-19 PROBLEM — J30.89 ENVIRONMENTAL AND SEASONAL ALLERGIES: Status: ACTIVE | Noted: 2017-10-24

## 2022-03-20 PROBLEM — J45.21 MILD INTERMITTENT ASTHMA WITH ACUTE EXACERBATION: Status: ACTIVE | Noted: 2017-10-24

## 2022-04-20 ENCOUNTER — TELEPHONE (OUTPATIENT)
Dept: ENDOCRINOLOGY | Age: 32
End: 2022-04-20

## 2022-04-20 ENCOUNTER — VIRTUAL VISIT (OUTPATIENT)
Dept: PRIMARY CARE CLINIC | Age: 32
End: 2022-04-20
Payer: COMMERCIAL

## 2022-04-20 DIAGNOSIS — N32.89 BLADDER SPASM: ICD-10-CM

## 2022-04-20 DIAGNOSIS — E03.9 ACQUIRED HYPOTHYROIDISM: Primary | ICD-10-CM

## 2022-04-20 DIAGNOSIS — N30.90 CYSTITIS: Primary | ICD-10-CM

## 2022-04-20 DIAGNOSIS — K52.9 GASTROENTERITIS: ICD-10-CM

## 2022-04-20 DIAGNOSIS — R35.0 INCREASED URINARY FREQUENCY: ICD-10-CM

## 2022-04-20 PROCEDURE — 99213 OFFICE O/P EST LOW 20 MIN: CPT | Performed by: INTERNAL MEDICINE

## 2022-04-20 RX ORDER — NITROFURANTOIN 25; 75 MG/1; MG/1
100 CAPSULE ORAL 2 TIMES DAILY
Qty: 14 CAPSULE | Refills: 0 | Status: SHIPPED | OUTPATIENT
Start: 2022-04-20 | End: 2022-04-27

## 2022-04-20 NOTE — PROGRESS NOTES
Abdulaziz Silver (: 1990) is a 33 y.o. female, established patient, here for evaluation of the following chief complaint(s):   Vomiting     Written by Frandy Omalley, as dictated by Dr. Victoriano Elise MD.      ASSESSMENT/PLAN:  Below is the assessment and plan developed based on review of pertinent history, labs, studies, and medications. 1. Cystitis  I rx'd a course of Macrobid 100 mg BID x7 days. Potential side effects were discussed. Recommend she have a UA when she gets lab work done with Dr. Marian Hui (endocrinology). -     nitrofurantoin, macrocrystal-monohydrate, (MACROBID) 100 mg capsule; Take 1 Capsule by mouth two (2) times a day for 7 days. , Normal, Disp-14 Capsule, R-0 sent to pharmacy. 2. Gastroenteritis  GI symptoms have resolved. 3. Increased urinary frequency  Secondary to cystitis. 4. Bladder spasm  Secondary to cystitis. SUBJECTIVE/OBJECTIVE:  HPI   The patient presents virtually today c/o lower abdominal pressure and urinary frequency. She had a stomach bug last week and was vomiting and had diarrhea. Her GI symptoms have resolved, but she now has UTI symptoms. She has been feeling fatigued and dizzy recently. She is planning on scheduling an appt with Dr. Marian Hui (endocrinology) to get her thyroid levels checked. Patient Active Problem List   Diagnosis Code    Chronic LBP M54.50, G89.29    Mild intermittent asthma with acute exacerbation J45.21    Environmental and seasonal allergies J30.89    H/O extrinsic asthma Z87.09    Obesity, morbid ([facility] [address] E66.01    Acquired hypothyroidism E03.9        Current Outpatient Medications on File Prior to Visit   Medication Sig Dispense Refill    liothyronine (CYTOMEL) 5 mcg tablet Take 5 mcg by mouth daily.  buPROPion XL (WELLBUTRIN XL) 150 mg tablet TAKE 1 TABLET BY MOUTH EVERY DAY IN THE MORNING 90 Tablet 0    MAGNESIUM PO magnesium 500 mg tablet   Take by oral route.       levothyroxine (LevoxyL) 112 mcg tablet       multivitamin (ONE A DAY) tablet Take 1 Tab by mouth daily.  cetirizine (ZYRTEC) 10 mg tablet TAKE 1 TABLET BY MOUTH EVERY DAY 30 Tab 0    cholecalciferol (VITAMIN D3) 2,000 unit cap capsule Take 2,000 Units by mouth daily. Indications: Vitamin D Deficiency (High Dose Therapy) (Patient taking differently: Take 2,000 Units by mouth daily. Indications: vitamin D deficiency (high dose therapy)) 90 Cap 1    cholestyramine-sucrose (QUESTRAN) 4 gram powder Take  by mouth as needed.  albuterol (PROVENTIL HFA, VENTOLIN HFA, PROAIR HFA) 90 mcg/actuation inhaler Take 2 Puffs by inhalation every four (4) hours as needed for Wheezing. (Patient taking differently: Take 2 Puffs by inhalation every four (4) hours as needed for Wheezing.) 1 Inhaler 0     No current facility-administered medications on file prior to visit. Allergies   Allergen Reactions    Amoxicillin-Pot Clavulanate Rash    Banana Other (comments)     Abdominal pain    Other reaction(s): Other, Vomiting  Other reaction(s):  Other (comments), Vomiting  Abdominal pain      Shellfish Containing Products Hives and Swelling     Other reaction(s): Facial Swelling  Wheezing    Shellfish Derived Swelling, Hives and Rash     Wheezing  Other reaction(s): Facial Swelling    Cephalexin Rash    Liothyronine Hives, Swelling and Anxiety       Past Medical History:   Diagnosis Date    Asthma     exercise / cold induced doesn't use inhaler    Hypothyroid     Interstitial cystitis        Past Surgical History:   Procedure Laterality Date    COLONOSCOPY N/A 1/18/2018    COL[facility] AM[facility]ULAT[facility]    HX CHOLECYSTECTOMY         Family History   Problem Relation Age of Onset    Hypertension Mother     Diabetes Mother     Heart Disease Mother         Arrythmia    Diabetes Father     Hypertension Father     Diabetes Sister         Borderline    Cancer Brother         Brain Tumor    Other Maternal Grandmother during childbirth    No Known Problems Maternal Grandfather     Heart Failure Paternal Grandmother     Diabetes Paternal Grandmother     Hypertension Paternal Grandmother     No Known Problems Paternal Grandfather     Thyroid Disease Maternal Aunt     No Known Problems Sister     Other Brother         eye issues    Thyroid Cancer Maternal Aunt     Cancer Maternal Aunt         Thyroid       Social History     Socioeconomic History    Marital status: SINGLE     Spouse name: Not on file    Number of children: Not on file    Years of education: Not on file    Highest education level: Not on file   Occupational History    Not on file   Tobacco Use    Smoking status: Never Smoker    Smokeless tobacco: Never Used   Vaping Use    Vaping Use: Never used   Substance and Sexual Activity    Alcohol use: No    Drug use: No    Sexual activity: Yes     Partners: Male   Other Topics Concern    Not on file   Social History Narrative    Not on file       Abstract on 03/09/2022   Component Date Value Ref Range Status    Pap Smear, External 04/06/2021 nil   Final      Review of Systems   Constitutional: Negative for activity change, fatigue and unexpected weight change. HENT: Negative for congestion, hearing loss, rhinorrhea and sore throat. Eyes: Negative for discharge. Respiratory: Negative for cough, chest tightness and shortness of breath. Cardiovascular: Negative for leg swelling. Gastrointestinal: Negative for abdominal pain, constipation and diarrhea. Genitourinary: Positive for frequency. Negative for dysuria, flank pain and urgency. Musculoskeletal: Negative for arthralgias, back pain and myalgias. Skin: Negative for color change and rash. Neurological: Negative for dizziness, light-headedness and headaches. Psychiatric/Behavioral: Negative for dysphoric mood and sleep disturbance. The patient is not nervous/anxious.            Physical Exam    [INSTRUCTIONS:  \"[x]\" Indicates a positive item  \"[]\" Indicates a negative item  -- DELETE ALL ITEMS NOT EXAMINED]    Constitutional: [x] Appears well-developed and well-nourished [x] No apparent distress      [] Abnormal -     Mental status: [x] Alert and awake  [x] Oriented to person/place/time [x] Able to follow commands    [] Abnormal -     Eyes:   EOM    [x]  Normal    [] Abnormal -   Sclera  [x]  Normal    [] Abnormal -          Discharge [x]  None visible   [] Abnormal -     HENT: [x] Normocephalic, atraumatic  [] Abnormal -   [x] Mouth/Throat: Mucous membranes are moist    External Ears [x] Normal  [] Abnormal -    Neck: [x] No visualized mass [] Abnormal -     Pulmonary/Chest: [x] Respiratory effort normal   [x] No visualized signs of difficulty breathing or respiratory distress        [] Abnormal -      Musculoskeletal:   [x] Normal gait with no signs of ataxia         [x] Normal range of motion of neck        [] Abnormal -     Neurological:        [x] No Facial Asymmetry (Cranial nerve 7 motor function) (limited exam due to video visit)          [x] No gaze palsy        [] Abnormal -          Skin:        [x] No significant exanthematous lesions or discoloration noted on facial skin         [] Abnormal -            Psychiatric:       [x] Normal Affect [] Abnormal -        [x] No Hallucinations    Other pertinent observable physical exam findings:-    Antionette De Leon, was evaluated through a synchronous (real-time) audio-video encounter. The patient (or guardian if applicable) is aware that this is a billable service, which includes applicable co-pays. Verbal consent to proceed has been obtained. The visit was conducted pursuant to the emergency declaration under the [facility], [address] [place] [address] authority and the [employer] General Act. Patient identification was verified, and a caregiver was present when appropriate.  The patient was located at home in a state where the provider was licensed to provide care. An electronic signature was used to authenticate this note.   -- Forrest Juan

## 2022-04-20 NOTE — TELEPHONE ENCOUNTER
Pt was made aware that Dr Delrae Simmonds is requesting for have her labs drawn. Pt then stated that she will complete her labs tomorrow.

## 2022-04-20 NOTE — TELEPHONE ENCOUNTER
4/20/2022  2:12 PM      Pt stated she is having dizzy spells,severe fatigue and feeling exhausted. Pt stated thyroid level is off  next follow up appointment is not until October she said that's too far.   GP#[phone]      Thanks,  Pedrito Skinner

## 2022-04-26 LAB
ALBUMIN SERPL-MCNC: 4.5 G/DL (ref 3.8–4.8)
ALBUMIN/GLOB SERPL: 1.6 {RATIO} (ref 1.2–2.2)
ALP SERPL-CCNC: 79 IU/L (ref 44–121)
ALT SERPL-CCNC: 26 IU/L (ref 0–32)
AST SERPL-CCNC: 17 IU/L (ref 0–40)
BASOPHILS # BLD AUTO: 0.1 X10E3/UL (ref 0–0.2)
BASOPHILS NFR BLD AUTO: 1 %
BILIRUB SERPL-MCNC: 0.2 MG/DL (ref 0–1.2)
BUN SERPL-MCNC: 11 MG/DL (ref 6–20)
BUN/CREAT SERPL: 14 (ref 9–23)
CALCIUM SERPL-MCNC: 9.5 MG/DL (ref 8.7–10.2)
CHLORIDE SERPL-SCNC: 101 MMOL/L (ref 96–106)
CO2 SERPL-SCNC: 22 MMOL/L (ref 20–29)
CREAT SERPL-MCNC: 0.78 MG/DL (ref 0.57–1)
EGFR: 103 ML/MIN/1.73
EOSINOPHIL # BLD AUTO: 0.3 X10E3/UL (ref 0–0.4)
EOSINOPHIL NFR BLD AUTO: 4 %
ERYTHROCYTE [DISTWIDTH] IN BLOOD BY AUTOMATED COUNT: 12.8 % (ref 11.7–15.4)
GLOBULIN SER CALC-MCNC: 2.9 G/DL (ref 1.5–4.5)
GLUCOSE SERPL-MCNC: 76 MG/DL (ref 65–99)
HCT VFR BLD AUTO: 37.9 % (ref 34–46.6)
HGB BLD-MCNC: 12.6 G/DL (ref 11.1–15.9)
IMM GRANULOCYTES # BLD AUTO: 0 X10E3/UL (ref 0–0.1)
IMM GRANULOCYTES NFR BLD AUTO: 0 %
LYMPHOCYTES # BLD AUTO: 3.1 X10E3/UL (ref 0.7–3.1)
LYMPHOCYTES NFR BLD AUTO: 35 %
MCH RBC QN AUTO: 29.2 PG (ref 26.6–33)
MCHC RBC AUTO-ENTMCNC: 33.2 G/DL (ref 31.5–35.7)
MCV RBC AUTO: 88 FL (ref 79–97)
MONOCYTES # BLD AUTO: 0.6 X10E3/UL (ref 0.1–0.9)
MONOCYTES NFR BLD AUTO: 7 %
NEUTROPHILS # BLD AUTO: 4.7 X10E3/UL (ref 1.4–7)
NEUTROPHILS NFR BLD AUTO: 53 %
PLATELET # BLD AUTO: 365 X10E3/UL (ref 150–450)
POTASSIUM SERPL-SCNC: 4.5 MMOL/L (ref 3.5–5.2)
PROT SERPL-MCNC: 7.4 G/DL (ref 6–8.5)
RBC # BLD AUTO: 4.32 X10E6/UL (ref 3.77–5.28)
SODIUM SERPL-SCNC: 137 MMOL/L (ref 134–144)
T4 FREE SERPL-MCNC: 1.16 NG/DL (ref 0.82–1.77)
TSH SERPL DL<=0.005 MIU/L-ACNC: 3.51 UIU/ML (ref 0.45–4.5)
WBC # BLD AUTO: 8.8 X10E3/UL (ref 3.4–10.8)

## 2022-04-27 ENCOUNTER — TELEPHONE (OUTPATIENT)
Dept: ENDOCRINOLOGY | Age: 32
End: 2022-04-27

## 2022-04-27 NOTE — LETTER
4/27/2022    Ms. [facility] [id number]      Dear Niyah Smith:    Please find your most recent results below. Resulted Orders   TSH 3RD GENERATION   Result Value Ref Range    TSH 3.510 0.450 - 4.500 uIU/mL    Narrative    Performed at:  [address] [employer] [facility] [address]  [id number]  : Gian Peterson MD, Phone:  [phone]   T4, FREE   Result Value Ref Range    T4, Free 1.16 0.82 - 1.77 ng/dL    Narrative    Performed at:  [address] [facility] [address]  [id number]  : Gian Peterson MD, Phone:  [phone]   CBC WITH AUTOMATED DIFF   Result Value Ref Range    WBC 8.8 3.4 - 10.8 x10E3/uL    RBC 4.32 3.77 - 5.28 x10E6/uL    HGB 12.6 11.1 - 15.9 g/dL    HCT 37.9 34.0 - 46.6 %    MCV 88 79 - 97 fL    MCH 29.2 26.6 - 33.0 pg    MCHC 33.2 31.5 - 35.7 g/dL    RDW 12.8 11.7 - 15.4 %    PLATELET 259 922 - 855 x10E3/uL    NEUTROPHILS 53 Not Estab. %    Lymphocytes 35 Not Estab. %    MONOCYTES 7 Not Estab. %    EOSINOPHILS 4 Not Estab. %    BASOPHILS 1 Not Estab. %    ABS. NEUTROPHILS 4.7 1.4 - 7.0 x10E3/uL    Abs Lymphocytes 3.1 0.7 - 3.1 x10E3/uL    ABS. MONOCYTES 0.6 0.1 - 0.9 x10E3/uL    ABS. EOSINOPHILS 0.3 0.0 - 0.4 x10E3/uL    ABS. BASOPHILS 0.1 0.0 - 0.2 x10E3/uL    IMMATURE GRANULOCYTES 0 Not Estab. %    ABS. IMM.  GRANS. 0.0 0.0 - 0.1 x10E3/uL    Narrative    Performed at:  [employer] [address]  [id number]  : Gian Peterson MD, Phone:  [phone]   METABOLIC PANEL, COMPREHENSIVE   Result Value Ref Range    Glucose 76 65 - 99 mg/dL    BUN 11 6 - 20 mg/dL    Creatinine 0.78 0.57 - 1.00 mg/dL    eGFR 103 >59 mL/min/1.73    BUN/Creatinine ratio 14 9 - 23    Sodium 137 134 - 144 mmol/L    Potassium 4.5 3.5 - 5.2 mmol/L    Chloride 101 96 - 106 mmol/L    CO2 22 20 - 29 mmol/L    Calcium 9.5 8.7 - 10.2 mg/dL    Protein, total 7.4 6.0 - 8.5 g/dL    Albumin 4.5 3.8 - 4.8 g/dL GLOBULIN, TOTAL 2.9 1.5 - 4.5 g/dL    A-G Ratio 1.6 1.2 - 2.2    Bilirubin, total 0.2 0.0 - 1.2 mg/dL    Alk. phosphatase 79 44 - 121 IU/L    AST (SGOT) 17 0 - 40 IU/L    ALT (SGPT) 26 0 - 32 IU/L    Narrative    Performed at:  [address]  [address]  [id number]  : Segundo Beaver MD, Phone:  [phone]       RECOMMENDATIONS:    Carlos A Cortez,     Your thyroid labs are normal as are your electrolytes, kidney function, liver function tests and blood counts. Please schedule a follow up appointment at your convenience.      Please call me if you have any questions: [phone]    Sincerely,      Talha Butcher MD

## 2022-04-27 NOTE — TELEPHONE ENCOUNTER
4/27/2022  2:32 PM        Pt would like nurse to call her. Pt stated she got a letter telling her about her lab results pt also stated she got notes in my chart. Pt stated she is still having leg weakness,dizziness,lightheaded and feeling out of it. Pt stated we said her numbers were normal but she would like an explanation. OE#[phone]      Thanks,  Stanley Borjas

## 2022-04-28 NOTE — TELEPHONE ENCOUNTER
Pt was made aware of the provider message and she voiced understanding but stated that due to her TSH being in the normal high range, can her thyroid medication be increased.

## 2022-04-28 NOTE — TELEPHONE ENCOUNTER
She needs to have a physical exam with Ruth Rodriguez MD to discuss these symptoms and the possible differential diagnosis. I ordered labs to ensure the thyroid was not the cause of the symptoms and it is not. There are a lot of different potential causes of leg weakness, dizziness, lightheadedness aside from the thyroid that should be looked into.      Hansa Leong [facility]

## 2022-05-04 NOTE — TELEPHONE ENCOUNTER
She can schedule a follow up visit to discuss that if she would like. [place] when the TSH is normal, we don't push it further unless the patient is interested in getting pregnant in the near future.     Hansa Metz [facility]

## 2022-05-05 NOTE — TELEPHONE ENCOUNTER
Pt was notified of Dr Rachael Pearson message and she voiced understanding of what was read to her. She was then offered an appt for 6/3 to discuss in details her lab concerns and the date was accepted. L[place],  Can you schedule pt for Iman 3 at 12:10?

## 2022-06-03 ENCOUNTER — VIRTUAL VISIT (OUTPATIENT)
Dept: ENDOCRINOLOGY | Age: 32
End: 2022-06-03
Payer: COMMERCIAL

## 2022-06-03 DIAGNOSIS — R29.898 WEAKNESS OF BOTH LOWER EXTREMITIES: ICD-10-CM

## 2022-06-03 DIAGNOSIS — R53.82 CHRONIC FATIGUE: ICD-10-CM

## 2022-06-03 DIAGNOSIS — E03.9 ACQUIRED HYPOTHYROIDISM: Primary | ICD-10-CM

## 2022-06-03 PROCEDURE — 99214 OFFICE O/P EST MOD 30 MIN: CPT | Performed by: INTERNAL MEDICINE

## 2022-06-03 RX ORDER — LEVOTHYROXINE SODIUM 112 UG/1
112 TABLET ORAL EVERY MORNING
Qty: 90 TABLET | Refills: 1 | Status: SHIPPED | OUTPATIENT
Start: 2022-06-03

## 2022-06-03 NOTE — PROGRESS NOTES
Chief Complaint   Patient presents with    Thyroid Problem    Follow-up     History of Present Illness: Mel Magaña is a 33 y.o. female with a past medical history significant for exercise-induced asthma, interstitial cystitis and hypothyroidism seen in referral from Ramiro Zeng MD for discussion related to hypothyroidism. Previously:  Initial diagnosis of hypothyroidism was made 4-5 years ago. Was begun on levothyroxine initially. Started having bad anxiety so she was switched to levoxyl. In the past 2 months, liothyronine was added due to exhaustion despite normal labs. Describes days when she could not get out of bed- 1 year. At night, does not sleep- could be exhausted and when she goes to bed she cannot sleep. Takes liothyronine at 0800. At night, she gets restless leg syndrome, takes magnesium to help with this. Also helps with interstitial cystitis. Takes magnesium at night time. Muscle fatigue when she's dehydrated, does not have the energy- muscles feel drained. Has gotten to the point that she feels down, affects mood. No motivation to do anything. At night when she tries to go to sleep, cannot rest. Can lay there for hours. Doesn't fall asleep until [id number]. Never had issues with this until she got pregnant with son. Prior to that had no issues going to sleep. Wants to finish school before having another child. Does have racing thoughts. Wakes up in the middle of the night. Does not snore. Does not wake up feeling rested. Days she takes bumetanide does not have restless legs. Around the cycle legs bother her. Levoxyl has calmed down anxiety. 07/12/2021: Discontinued levoxyl and liothyronine dut to sandpaper rash on chest, face, eyes were \"puffy\" Did not itch. Felt so much better on liothyronine \"like night and day\" was getting back into a normal sleep routine. Since she's hasn't taken it hasn't slept at all. Felt awful on 100mcg, had to go to 112mcg. Aches and pains/exhaustion had resolved. Was able to get up and be productive, do stuff. Was also feeling anxious with the liothyronine. Was feeling anxious on generic levoxyl, brand stopped it.    06/03/2022:Is experiencing weakness in the legs twice a week, a lot of days when she wakes up feels like she's in a fog- feels like she needs coffee all day. The weakness has been occurring for a couple months- started when hypothyroidism started and resolved with the initiation of levothyroxine. Is using levothyroxine 112mcg, liothyronine 5mcg. Just picked up prescriptions 2 weeks ago, changed . Pill is different now. Feels exhausted all the time and swollen in the hands. Current Outpatient Medications   Medication Sig    LevoxyL 112 mcg tablet Take 1 Tablet by mouth Every morning.  liothyronine (CYTOMEL) 5 mcg tablet Take 5 mcg by mouth daily.  buPROPion XL (WELLBUTRIN XL) 150 mg tablet TAKE 1 TABLET BY MOUTH EVERY DAY IN THE MORNING    MAGNESIUM PO magnesium 500 mg tablet   Take by oral route.  multivitamin (ONE A DAY) tablet Take 1 Tab by mouth daily.  cetirizine (ZYRTEC) 10 mg tablet TAKE 1 TABLET BY MOUTH EVERY DAY    cholestyramine-sucrose (QUESTRAN) 4 gram powder Take  by mouth as needed.  albuterol (PROVENTIL HFA, VENTOLIN HFA, PROAIR HFA) 90 mcg/actuation inhaler Take 2 Puffs by inhalation every four (4) hours as needed for Wheezing. (Patient taking differently: Take 2 Puffs by inhalation every four (4) hours as needed for Wheezing.)     No current facility-administered medications for this visit.        Social Hx: alejandra, children    Review of Systems:  - See HPI    - Physical Examination:  - - GENERAL: Sadia Jones well nourished   - EYES: EOMI, non-icteric, no proptosis   - Ear/Nose/Throat: NCAT, no visible inflammation or masses   - CARDIOVASCULAR: no cyanosis, no visible JVD   - RESPIRATORY: respiratory effort normal without any distress or labored breathing   - MUSCULOSKELETAL: Normal ROM of neck and upper extremities observed   - SKIN: No rash on face  - NEUROLOGIC:  No facial asymmetry (Cranial nerve 7 motor function), No gaze palsy   - PSYCHIATRIC: Normal affect, Normal insight and judgement     Data Reviewed:       Results for Jeff Sanches (MRN [id number]) as of 5/14/2021 11:56   Ref. Range 1/28/2021 10:16   Cortisol, a.m. Latest Ref Range: 4.30 - 22.45 ug/dL 11.6     Results for Jeff Sanches (MRN [id number]) as of 5/14/2021 11:56   Ref. Range 6/27/2019 15:23 1/16/2020 11:26 1/6/2021 15:13 1/28/2021 10:16   Sodium Latest Ref Range: 136 - 145 mmol/L 140 139 136 135 (L)       Assessment/Plan: This is a very pleasant 25-year-old female with past medical history significant for interstitial cystitis seen infollow up for discussion related to thyroid hormone supplementation. Previously trialed Cytomel 5 mcg and Levoxyl to 100 mcg to see if this improved energy and it did significantly, pt stated it was \"like night and day\" and that she could \"finally sleep\". Developed a rash around the time of cytomel use so discontinued it and has been feeling very poor again for the past few days. We discussed the fact that there is no cytomel alternative unfortunately. Has since retrialed cytomel (made pt aware that if she experiences shortness or breath/swelling as occurred previously, will need to call the ambulance, she does not have an epi-pen at home) with resolution of the rash. Reviewed the fact that a normal Free T4 level suggests that brain fog, weakness in the lower extremities, and fatigue is not suggestive of hypOthyroidism causing these symptoms. Consider NILAY eval. Send in rx for levoxyl only.      #Hypothyroidism, fatigue  -continue levoxyl 112mcg- NAME BRAND ONLY  -continue liothyronine 5mcg    #fatigue  -consider alternative etiologies to brain fog, fatigue, weakness in the lower extremities- previously placed referral to sleep medicine  -iron panel previously normal    Copy sent to:Trinidad Bragg MD      Return to care: annually    Niyah Smith is a 33 y.o. female being evaluated by a Virtual Visit (video visit) encounter to address concerns as mentioned above. A caregiver was present when appropriate. Due to this being a TeleHealth encounter (During [facility] public health emergency), evaluation of the following organ systems was limited:     Vitals/Constitutional/EENT/Resp/CV/GI//MS/Neuro/Skin/Heme-Lymph-Imm. Pursuant to the emergency declaration under the [address] [place] [address] and the [employer] Act, this Virtual Visit was conducted with patient's (and/or legal guardian's) consent, to reduce the risk of exposure to COVID-19 and provide necessary medical care. Services were provided through a video synchronous discussion virtually to substitute for in-person encounter. --Janet Guillory MD on 6/3/2022 at 11:48 AM    An electronic signature was used to authenticate this note.

## 2022-06-16 DIAGNOSIS — F34.1 DYSTHYMIA: ICD-10-CM

## 2022-06-16 RX ORDER — BUPROPION HYDROCHLORIDE 150 MG/1
TABLET ORAL
Qty: 30 TABLET | Refills: 2 | Status: SHIPPED | OUTPATIENT
Start: 2022-06-16 | End: 2022-09-29

## 2022-06-16 RX ORDER — LIOTHYRONINE SODIUM 5 UG/1
TABLET ORAL
Qty: 90 TABLET | Refills: 3 | Status: SHIPPED | OUTPATIENT
Start: 2022-06-16

## 2022-06-20 ENCOUNTER — TELEPHONE (OUTPATIENT)
Dept: SLEEP MEDICINE | Age: 32
End: 2022-06-20

## 2022-06-27 ENCOUNTER — TELEPHONE (OUTPATIENT)
Dept: SLEEP MEDICINE | Age: 32
End: 2022-06-27

## 2022-06-27 NOTE — TELEPHONE ENCOUNTER
Called patient and lvm to schedule sleep consult, per Dr. Pastor Gifford. 3rd attempt. Letter mailed.

## 2022-06-28 DIAGNOSIS — B00.1 RECURRENT COLD SORES: ICD-10-CM

## 2022-06-28 RX ORDER — VALACYCLOVIR HYDROCHLORIDE 500 MG/1
500 TABLET, FILM COATED ORAL 2 TIMES DAILY
Qty: 20 TABLET | Refills: 0 | Status: SHIPPED | OUTPATIENT
Start: 2022-06-28 | End: 2022-07-08

## 2022-09-29 DIAGNOSIS — F34.1 DYSTHYMIA: ICD-10-CM

## 2022-09-29 RX ORDER — BUPROPION HYDROCHLORIDE 150 MG/1
TABLET ORAL
Qty: 30 TABLET | Refills: 2 | Status: SHIPPED | OUTPATIENT
Start: 2022-09-29

## 2022-12-07 ENCOUNTER — TELEPHONE (OUTPATIENT)
Dept: ENDOCRINOLOGY | Age: 32
End: 2022-12-07

## 2022-12-08 RX ORDER — LEVOTHYROXINE SODIUM 112 UG/1
112 TABLET ORAL
Qty: 90 TABLET | Refills: 2 | Status: SHIPPED | OUTPATIENT
Start: 2022-12-08

## 2023-01-06 ENCOUNTER — TELEPHONE (OUTPATIENT)
Dept: ENDOCRINOLOGY | Age: 33
End: 2023-01-06

## 2023-01-06 NOTE — TELEPHONE ENCOUNTER
1/6/2023  3:24 PM      Patient left a voice mail at 3:03 pm pt was returning nurse call in regards to medication change. #[phone]      Thanks,  Prachi Pope

## 2023-01-06 NOTE — TELEPHONE ENCOUNTER
Spoke with pt and was told that Unithroid, [place]  and Levoxyl and the preferred rx. I then made the pt aware that she currently has a rx for Unithroid at her pharmacy. Pt voiced understanding to what was said.

## 2023-01-06 NOTE — TELEPHONE ENCOUNTER
Pt was made aware and stated that she will call her insurance first then she will call her pharmacy for rx availability. She then stated that she's had a bad reaction to Levothyroxine, therefore, she's is unable to take the generic brand.

## 2023-01-06 NOTE — TELEPHONE ENCOUNTER
1/6/2023  9:18 AM      Patient called and left a voice mail at 8:39 am.Pt stated she was calling about her levoxyl 112 mg. Pt stated she is having a hard time getting it refilled. Pt would like to know if she can switch to something else. Pt took last dose today. #[phone]      Thanks,  Luci James

## 2023-01-09 ENCOUNTER — PATIENT MESSAGE (OUTPATIENT)
Dept: PRIMARY CARE CLINIC | Age: 33
End: 2023-01-09

## 2023-01-09 DIAGNOSIS — B00.1 RECURRENT COLD SORES: ICD-10-CM

## 2023-01-09 RX ORDER — VALACYCLOVIR HYDROCHLORIDE 500 MG/1
500 TABLET, FILM COATED ORAL 2 TIMES DAILY
Qty: 20 TABLET | Refills: 0 | Status: SHIPPED | OUTPATIENT
Start: 2023-01-09 | End: 2023-01-19

## 2023-01-09 NOTE — TELEPHONE ENCOUNTER
Pt called 1/9 @ 3:01 PM    Pt stated that she was supposed to have her prescription go from levoxyl to unithroid, but the pharmacy only has levoxyl order still. She said it needs to specifically say unithroid.     Pt# [phone]

## 2023-01-09 NOTE — TELEPHONE ENCOUNTER
Requested Prescriptions     Pending Prescriptions Disp Refills    valACYclovir (VALTREX) 500 mg tablet 20 Tablet 0     Sig: Take 1 Tablet by mouth two (2) times a day for 20 doses.         Last Visit 3/9/22  Last Refill 6/28/22

## 2023-01-09 NOTE — TELEPHONE ENCOUNTER
Patient called in wanting to know if  can refill today as she has a cold sore starting and does not want it to get to worse.    Did advise it could take 24-48 hrs but I would let  know

## 2023-01-09 NOTE — TELEPHONE ENCOUNTER
Spoke with Mariella Skinner the pharmacist at [facility] for clarity of why the Unithroid that was sent on 12/8/22 and 01/6/23 has not yet been filled. I was them made aware that they are suspecting an order for the unithroid rx to arrive tomorrow. Pt was then made aware.

## 2023-02-22 DIAGNOSIS — F34.1 DYSTHYMIA: ICD-10-CM

## 2023-02-22 RX ORDER — BUPROPION HYDROCHLORIDE 150 MG/1
TABLET ORAL
Qty: 30 TABLET | Refills: 2 | Status: SHIPPED | OUTPATIENT
Start: 2023-02-22

## 2023-07-17 RX ORDER — LIOTHYRONINE SODIUM 5 UG/1
TABLET ORAL
Qty: 30 TABLET | Refills: 1 | Status: SHIPPED | OUTPATIENT
Start: 2023-07-17 | End: 2023-08-22 | Stop reason: ALTCHOICE

## 2023-07-26 ENCOUNTER — OFFICE VISIT (OUTPATIENT)
Age: 33
End: 2023-07-26

## 2023-07-26 VITALS
HEIGHT: 62 IN | HEART RATE: 82 BPM | BODY MASS INDEX: 43.79 KG/M2 | RESPIRATION RATE: 16 BRPM | OXYGEN SATURATION: 100 % | SYSTOLIC BLOOD PRESSURE: 121 MMHG | DIASTOLIC BLOOD PRESSURE: 84 MMHG | WEIGHT: 238 LBS | TEMPERATURE: 98.8 F

## 2023-07-26 DIAGNOSIS — R53.83 OTHER FATIGUE: ICD-10-CM

## 2023-07-26 DIAGNOSIS — R42 DIZZINESS: Primary | ICD-10-CM

## 2023-07-26 LAB
GLUCOSE DOSE-GTT, POC: 80
HEMOGLOBIN, POC: 13 G/DL

## 2023-07-26 ASSESSMENT — ENCOUNTER SYMPTOMS
SORE THROAT: 0
ABDOMINAL PAIN: 0
DIARRHEA: 0
NAUSEA: 0
COUGH: 0
VOMITING: 0
WHEEZING: 0
SHORTNESS OF BREATH: 0
RHINORRHEA: 0

## 2023-07-26 NOTE — PROGRESS NOTES
Mood normal.         Behavior: Behavior normal.         Thought Content: Thought content normal.        1. Dizziness  -     AMB POC HEMOGLOBIN (HGB)  -     AMB POC GLUCOSE TEST  2. Other fatigue   Start MV with iron, B compleax and guerita D 5000 units/ day   Continue present Rx   Het thyroid test done    May use compression stockings and more fluids with electrolytes    Results for orders placed or performed in visit on 07/26/23   AMB POC HEMOGLOBIN (HGB)   Result Value Ref Range    Hemoglobin, POC 13.0 G/DL   AMB POC GLUCOSE TEST   Result Value Ref Range    Glucose dose-GTT, POC 80      The patients condition was discussed with the patient and they understand. The patient is to follow up with primary care doctor. If signs and symptoms become worse the pt is to go to the ER. The patient is to take medications as prescribed.

## 2023-07-26 NOTE — PATIENT INSTRUCTIONS
Results for orders placed or performed in visit on 07/26/23   AMB POC HEMOGLOBIN (HGB)   Result Value Ref Range    Hemoglobin, POC 13.0 G/DL   AMB POC GLUCOSE TEST   Result Value Ref Range    Glucose dose-GTT, POC 80        Start MV with iron, B compleax and guerita D 5000 units/ day   Continue present Rx

## 2023-07-27 ENCOUNTER — TELEPHONE (OUTPATIENT)
Age: 33
End: 2023-07-27

## 2023-07-27 DIAGNOSIS — E03.9 HYPOTHYROIDISM, UNSPECIFIED TYPE: Primary | ICD-10-CM

## 2023-07-27 NOTE — TELEPHONE ENCOUNTER
Pt called 7/27 @ 1:34 PM    Pt stated she would like to talk to Dr José Miguel Lopez. She is having some symptoms she would like to discuss including fatigue, dizzy, very sleepy, headaches, just feeling terrible overall.     Pt# [phone]

## 2023-07-28 DIAGNOSIS — E03.9 HYPOTHYROIDISM, UNSPECIFIED TYPE: ICD-10-CM

## 2023-08-04 ENCOUNTER — NURSE TRIAGE (OUTPATIENT)
Dept: OTHER | Facility: CLINIC | Age: 33
End: 2023-08-04

## 2023-08-04 NOTE — TELEPHONE ENCOUNTER
Location of patient: [facility] call from FACUNDODAMARI at [facility] with [employer] Complaint of lightheadedness and fatigue. No triage as this is a duplicate call as patient was seen in an Urgent Care last week and symptoms have actually    Patient denies any other questions or concerns; instructed to call back for any new or worsening symptoms. Warm transfer to [facility] at the [facility] for scheduling a routine appt as soon as possible     Attention Provider: Thank you for allowing me to participate in the care of your patient. The patient was connected to triage in response to information provided to the ECC/PSC. Please do not respond through this encounter as the response is not directed to a shared pool. Reason for Disposition  Samantha Briggs Caller has already spoken with another triager or PCP (or office), and has further questions and triager able to answer questions.     Protocols used: No Contact or Duplicate Contact Call-ADULT-[place]

## 2023-08-08 ENCOUNTER — OFFICE VISIT (OUTPATIENT)
Dept: PRIMARY CARE CLINIC | Facility: CLINIC | Age: 33
End: 2023-08-08
Payer: COMMERCIAL

## 2023-08-08 VITALS
HEART RATE: 85 BPM | BODY MASS INDEX: 44.94 KG/M2 | RESPIRATION RATE: 17 BRPM | OXYGEN SATURATION: 100 % | HEIGHT: 62 IN | DIASTOLIC BLOOD PRESSURE: 84 MMHG | SYSTOLIC BLOOD PRESSURE: 128 MMHG | WEIGHT: 244.2 LBS | TEMPERATURE: 97.8 F

## 2023-08-08 DIAGNOSIS — E03.9 ACQUIRED HYPOTHYROIDISM: Primary | ICD-10-CM

## 2023-08-08 DIAGNOSIS — Z11.4 ENCOUNTER FOR SCREENING FOR HIV: ICD-10-CM

## 2023-08-08 DIAGNOSIS — G89.29 CHRONIC JOINT PAIN: ICD-10-CM

## 2023-08-08 DIAGNOSIS — M79.89 FINGER SWELLING: ICD-10-CM

## 2023-08-08 DIAGNOSIS — R53.83 OTHER FATIGUE: ICD-10-CM

## 2023-08-08 DIAGNOSIS — R42 DIZZINESS: ICD-10-CM

## 2023-08-08 DIAGNOSIS — E03.9 ACQUIRED HYPOTHYROIDISM: ICD-10-CM

## 2023-08-08 DIAGNOSIS — M25.50 CHRONIC JOINT PAIN: ICD-10-CM

## 2023-08-08 DIAGNOSIS — L70.8 OTHER ACNE: ICD-10-CM

## 2023-08-08 PROCEDURE — 99214 OFFICE O/P EST MOD 30 MIN: CPT | Performed by: INTERNAL MEDICINE

## 2023-08-08 RX ORDER — CLINDAMYCIN AND BENZOYL PEROXIDE 10; 50 MG/G; MG/G
GEL TOPICAL
Qty: 35 G | Refills: 1 | Status: SHIPPED | OUTPATIENT
Start: 2023-08-08

## 2023-08-08 ASSESSMENT — PATIENT HEALTH QUESTIONNAIRE - PHQ9
SUM OF ALL RESPONSES TO PHQ QUESTIONS 1-9: 0
1. LITTLE INTEREST OR PLEASURE IN DOING THINGS: 0
SUM OF ALL RESPONSES TO PHQ QUESTIONS 1-9: 0
SUM OF ALL RESPONSES TO PHQ9 QUESTIONS 1 & 2: 0
SUM OF ALL RESPONSES TO PHQ QUESTIONS 1-9: 0
2. FEELING DOWN, DEPRESSED OR HOPELESS: 0
SUM OF ALL RESPONSES TO PHQ QUESTIONS 1-9: 0

## 2023-08-08 ASSESSMENT — ENCOUNTER SYMPTOMS
BACK PAIN: 0
CHEST TIGHTNESS: 0
RHINORRHEA: 0
ROS SKIN COMMENTS: ACNE
SORE THROAT: 0
ABDOMINAL PAIN: 0
CONSTIPATION: 0
COLOR CHANGE: 0
SHORTNESS OF BREATH: 0
DIARRHEA: 0
EYE DISCHARGE: 0
COUGH: 0

## 2023-08-09 DIAGNOSIS — L70.8 OTHER ACNE: Primary | ICD-10-CM

## 2023-08-09 LAB
ERYTHROCYTE [DISTWIDTH] IN BLOOD BY AUTOMATED COUNT: 13.6 % (ref 11.5–14.5)
HCT VFR BLD AUTO: 37.3 % (ref 35–47)
HGB BLD-MCNC: 11.8 G/DL (ref 11.5–16)
MCH RBC QN AUTO: 28.7 PG (ref 26–34)
MCHC RBC AUTO-ENTMCNC: 31.6 G/DL (ref 30–36.5)
MCV RBC AUTO: 90.8 FL (ref 80–99)
NRBC # BLD: 0 K/UL (ref 0–0.01)
NRBC BLD-RTO: 0 PER 100 WBC
PLATELET # BLD AUTO: 303 K/UL (ref 150–400)
PMV BLD AUTO: 11.6 FL (ref 8.9–12.9)
RBC # BLD AUTO: 4.11 M/UL (ref 3.8–5.2)
T4 FREE SERPL-MCNC: 1.1 NG/DL (ref 0.8–1.5)
TSH SERPL DL<=0.05 MIU/L-ACNC: 1.41 UIU/ML (ref 0.36–3.74)
TSH SERPL DL<=0.05 MIU/L-ACNC: 1.41 UIU/ML (ref 0.36–3.74)
WBC # BLD AUTO: 7.6 K/UL (ref 3.6–11)

## 2023-08-09 RX ORDER — CLINDAMYCIN PHOSPHATE 10 UG/ML
LOTION TOPICAL
Qty: 60 G | Refills: 2 | Status: SHIPPED | OUTPATIENT
Start: 2023-08-09 | End: 2023-09-08

## 2023-08-10 ENCOUNTER — TELEPHONE (OUTPATIENT)
Dept: PRIMARY CARE CLINIC | Facility: CLINIC | Age: 33
End: 2023-08-10

## 2023-08-10 LAB
ALBUMIN SERPL-MCNC: 3.7 G/DL (ref 3.5–5)
ALBUMIN/GLOB SERPL: 1.2 (ref 1.1–2.2)
ALP SERPL-CCNC: 73 U/L (ref 45–117)
ALT SERPL-CCNC: 22 U/L (ref 12–78)
ANA SER QL: NEGATIVE
ANION GAP SERPL CALC-SCNC: 6 MMOL/L (ref 5–15)
AST SERPL-CCNC: 11 U/L (ref 15–37)
BILIRUB SERPL-MCNC: 0.5 MG/DL (ref 0.2–1)
BUN SERPL-MCNC: 8 MG/DL (ref 6–20)
BUN/CREAT SERPL: 14 (ref 12–20)
CALCIUM SERPL-MCNC: 8.8 MG/DL (ref 8.5–10.1)
CCP IGA+IGG SERPL IA-ACNC: 5 UNITS (ref 0–19)
CHLORIDE SERPL-SCNC: 107 MMOL/L (ref 97–108)
CO2 SERPL-SCNC: 25 MMOL/L (ref 21–32)
CREAT SERPL-MCNC: 0.58 MG/DL (ref 0.55–1.02)
GLOBULIN SER CALC-MCNC: 3.2 G/DL (ref 2–4)
GLUCOSE SERPL-MCNC: 83 MG/DL (ref 65–100)
POTASSIUM SERPL-SCNC: 4.3 MMOL/L (ref 3.5–5.1)
PROT SERPL-MCNC: 6.9 G/DL (ref 6.4–8.2)
RHEUMATOID FACT SERPL-ACNC: <10 IU/ML
SODIUM SERPL-SCNC: 138 MMOL/L (ref 136–145)

## 2023-08-11 ENCOUNTER — TELEPHONE (OUTPATIENT)
Dept: PRIMARY CARE CLINIC | Facility: CLINIC | Age: 33
End: 2023-08-11

## 2023-08-22 ENCOUNTER — OFFICE VISIT (OUTPATIENT)
Age: 33
End: 2023-08-22
Payer: COMMERCIAL

## 2023-08-22 VITALS
OXYGEN SATURATION: 99 % | WEIGHT: 242 LBS | DIASTOLIC BLOOD PRESSURE: 75 MMHG | BODY MASS INDEX: 44.53 KG/M2 | HEIGHT: 62 IN | SYSTOLIC BLOOD PRESSURE: 113 MMHG | HEART RATE: 75 BPM | RESPIRATION RATE: 16 BRPM

## 2023-08-22 DIAGNOSIS — R53.82 CHRONIC FATIGUE, UNSPECIFIED: Primary | ICD-10-CM

## 2023-08-22 PROCEDURE — 99214 OFFICE O/P EST MOD 30 MIN: CPT | Performed by: INTERNAL MEDICINE

## 2023-08-22 RX ORDER — LEVOTHYROXINE SODIUM 0.12 MG/1
125 TABLET ORAL DAILY
Qty: 90 TABLET | Refills: 3 | Status: SHIPPED | OUTPATIENT
Start: 2023-08-22

## 2023-08-22 NOTE — PROGRESS NOTES
Chief Complaint   Patient presents with    Follow-up    Thyroid Problem        History of Present Illness: Marry Govea is a  27 y.o. female with a past medical history significant for exercise-induced asthma, interstitial  cystitis and hypothyroidism seen in referral from Shelly Shannon MD for discussion related to hypothyroidism. Previously:   Initial diagnosis of hypothyroidism was made 4-5 years ago. Was begun on levothyroxine initially. Started having bad anxiety so she was switched to levoxyl. In the past 2 months, liothyronine was added due to exhaustion despite normal labs. Describes  days when she could not get out of bed- 1 year. At night, does not sleep- could be exhausted and when she goes to bed she cannot sleep. Takes liothyronine at 0800. At night, she gets restless leg syndrome, takes magnesium to help with this. Also helps  with interstitial cystitis. Takes magnesium at night time. Muscle fatigue when she's dehydrated, does not have the energy- muscles feel drained. Has gotten to the point that she feels down, affects mood. No motivation to do anything. At night when she  tries to go to sleep, cannot rest. Can lay there for hours. Doesn't fall asleep until 0813-4799. Never had issues with this until she got pregnant with son. Prior to that had no issues going to sleep. Wants to finish school before having another child. Does have racing thoughts. Wakes up in the middle of the night. Does not snore. Does not wake up feeling rested. Days she takes bumetanide does not have restless legs. Around the cycle legs bother her. Levoxyl has calmed down anxiety. 07/12/2021: Discontinued levoxyl and liothyronine dut to sandpaper rash on chest, face, eyes were \"puffy\" Did not itch. Felt so much better  on liothyronine \"like night and day\" was getting back into a normal sleep routine. Since she's hasn't taken it hasn't slept at all. Felt awful on 100mcg, had to go to 112mcg.  Aches and pains/exhaustion

## 2023-09-07 NOTE — TELEPHONE ENCOUNTER
Per pharmacy,    Brand name is not covered by pt's insurance however unithroid, euthyrox and levoxly are. Please advise. Pt was made aware of possible drug change. She voiced understanding and stated that since stopping the cytomel, she has had a lot of swelling in her hands and legs. She then wanted to know the difference between taking the synthroid brand name rx vs taking unithroid.

## 2023-09-11 RX ORDER — LEVOTHYROXINE SODIUM 0.12 MG/1
125 TABLET ORAL DAILY
Qty: 90 TABLET | Refills: 3 | Status: CANCELLED | OUTPATIENT
Start: 2023-09-11

## 2023-09-11 RX ORDER — LEVOTHYROXINE SODIUM 0.12 MG/1
125 TABLET ORAL DAILY
Qty: 90 TABLET | Refills: 3 | Status: SHIPPED | OUTPATIENT
Start: 2023-09-11

## 2023-09-11 RX ORDER — LEVOTHYROXINE SODIUM 0.12 MG/1
125 TABLET ORAL DAILY
Qty: 30 TABLET | Refills: 0 | Status: CANCELLED | OUTPATIENT
Start: 2023-09-11

## 2023-09-11 NOTE — TELEPHONE ENCOUNTER
Pt was made aware of her provider's message and stated that she will prefer to take the synthroid brand name.

## 2023-09-12 LAB
25(OH)D3+25(OH)D2 SERPL-MCNC: 28.9 NG/ML (ref 30–100)
ALBUMIN SERPL-MCNC: 4.3 G/DL (ref 3.9–4.9)
ALBUMIN/GLOB SERPL: 1.7 {RATIO} (ref 1.2–2.2)
ALP SERPL-CCNC: 72 IU/L (ref 44–121)
ALT SERPL-CCNC: 10 IU/L (ref 0–32)
AST SERPL-CCNC: 12 IU/L (ref 0–40)
BASOPHILS # BLD AUTO: 0.1 X10E3/UL (ref 0–0.2)
BASOPHILS NFR BLD AUTO: 1 %
BILIRUB SERPL-MCNC: 0.4 MG/DL (ref 0–1.2)
BUN SERPL-MCNC: 9 MG/DL (ref 6–20)
BUN/CREAT SERPL: 14 (ref 9–23)
CALCIUM SERPL-MCNC: 9.5 MG/DL (ref 8.7–10.2)
CHLORIDE SERPL-SCNC: 102 MMOL/L (ref 96–106)
CO2 SERPL-SCNC: 23 MMOL/L (ref 20–29)
CREAT SERPL-MCNC: 0.63 MG/DL (ref 0.57–1)
DHEA-S SERPL-MCNC: 88.4 UG/DL (ref 57.3–279.2)
EGFRCR SERPLBLD CKD-EPI 2021: 119 ML/MIN/1.73
EOSINOPHIL # BLD AUTO: 0.2 X10E3/UL (ref 0–0.4)
EOSINOPHIL NFR BLD AUTO: 2 %
ERYTHROCYTE [DISTWIDTH] IN BLOOD BY AUTOMATED COUNT: 12.8 % (ref 11.7–15.4)
GLOBULIN SER CALC-MCNC: 2.5 G/DL (ref 1.5–4.5)
GLUCOSE SERPL-MCNC: 82 MG/DL (ref 70–99)
HCT VFR BLD AUTO: 36 % (ref 34–46.6)
HGB BLD-MCNC: 11.9 G/DL (ref 11.1–15.9)
IMM GRANULOCYTES # BLD AUTO: 0 X10E3/UL (ref 0–0.1)
IMM GRANULOCYTES NFR BLD AUTO: 0 %
IRON SATN MFR SERPL: 16 % (ref 15–55)
IRON SERPL-MCNC: 47 UG/DL (ref 27–159)
LYMPHOCYTES # BLD AUTO: 2.6 X10E3/UL (ref 0.7–3.1)
LYMPHOCYTES NFR BLD AUTO: 31 %
MCH RBC QN AUTO: 29.8 PG (ref 26.6–33)
MCHC RBC AUTO-ENTMCNC: 33.1 G/DL (ref 31.5–35.7)
MCV RBC AUTO: 90 FL (ref 79–97)
MONOCYTES # BLD AUTO: 0.6 X10E3/UL (ref 0.1–0.9)
MONOCYTES NFR BLD AUTO: 7 %
NEUTROPHILS # BLD AUTO: 5.1 X10E3/UL (ref 1.4–7)
NEUTROPHILS NFR BLD AUTO: 59 %
PLATELET # BLD AUTO: 352 X10E3/UL (ref 150–450)
POTASSIUM SERPL-SCNC: 4.1 MMOL/L (ref 3.5–5.2)
PROT SERPL-MCNC: 6.8 G/DL (ref 6–8.5)
RBC # BLD AUTO: 4 X10E6/UL (ref 3.77–5.28)
SODIUM SERPL-SCNC: 137 MMOL/L (ref 134–144)
TIBC SERPL-MCNC: 294 UG/DL (ref 250–450)
UIBC SERPL-MCNC: 247 UG/DL (ref 131–425)
VIT B12 SERPL-MCNC: 839 PG/ML (ref 232–1245)
WBC # BLD AUTO: 8.6 X10E3/UL (ref 3.4–10.8)

## 2023-09-13 LAB — TTG IGA SER-ACNC: <2 U/ML (ref 0–3)

## 2023-09-14 RX ORDER — LEVOTHYROXINE SODIUM 0.12 MG/1
125 TABLET ORAL DAILY
Qty: 90 TABLET | Refills: 3 | Status: SHIPPED | OUTPATIENT
Start: 2023-09-14

## 2023-09-17 LAB
TESTOST FREE SERPL-MCNC: 0.4 PG/ML (ref 0–4.2)
TESTOST SERPL-MCNC: 14.8 NG/DL (ref 10–55)

## 2023-09-22 RX ORDER — LEVOTHYROXINE SODIUM 125 UG/1
125 TABLET ORAL DAILY
Qty: 30 TABLET | Refills: 5 | Status: SHIPPED | OUTPATIENT
Start: 2023-09-22

## 2023-09-22 NOTE — TELEPHONE ENCOUNTER
Pt called and lvm stating that due to the Unithroid is covered by her insurance, she will like to try that first. If she notices any problems after taking the medication, she will then pay out of pocket for the brand name, Synthroid rx.

## 2023-09-22 NOTE — TELEPHONE ENCOUNTER
9/22/2023  12:06 PM      Pt cant afford to pay out of pocket for her medication and wants to speak in regards to looking into other ways of getting her medication. Pt's call back number is [phone].     Thank you,  Alonso Jennings

## 2023-09-22 NOTE — TELEPHONE ENCOUNTER
Pt was notified of Dr Eloisa Abdi message and she voiced understanding of what was read to her. However she stated that she can not take the generic thyroid medication. FYI, you sent in a prescription for the generic and not for the pending rx; also, due to pt's insurance, she  can not use the coupon for the Unithroid.

## 2023-10-03 ENCOUNTER — TELEPHONE (OUTPATIENT)
Age: 33
End: 2023-10-03

## 2023-10-03 DIAGNOSIS — E03.9 HYPOTHYROIDISM, UNSPECIFIED TYPE: Primary | ICD-10-CM

## 2023-10-03 NOTE — TELEPHONE ENCOUNTER
Pt called and lvm asking if her thyroid dose be lowered. Pt stated that she is having anxieties along with heart palpitation and believe that it is due to the dose of her thyroid medication.      Pt is currently being prescribed Unithroid 125 mcg

## 2023-10-04 DIAGNOSIS — F34.1 DYSTHYMIC DISORDER: ICD-10-CM

## 2023-10-05 RX ORDER — BUPROPION HYDROCHLORIDE 150 MG/1
150 TABLET ORAL EVERY MORNING
Qty: 30 TABLET | Refills: 2 | Status: SHIPPED | OUTPATIENT
Start: 2023-10-05

## 2023-10-13 LAB
T4 FREE SERPL-MCNC: 1.28 NG/DL (ref 0.82–1.77)
TSH SERPL DL<=0.005 MIU/L-ACNC: 1.7 UIU/ML (ref 0.45–4.5)

## 2023-10-17 ENCOUNTER — TELEMEDICINE (OUTPATIENT)
Dept: PRIMARY CARE CLINIC | Facility: CLINIC | Age: 33
End: 2023-10-17
Payer: COMMERCIAL

## 2023-10-17 DIAGNOSIS — E03.9 ACQUIRED HYPOTHYROIDISM: ICD-10-CM

## 2023-10-17 DIAGNOSIS — M79.89 LEG SWELLING: Primary | ICD-10-CM

## 2023-10-17 DIAGNOSIS — R00.2 PALPITATIONS: ICD-10-CM

## 2023-10-17 PROCEDURE — 99213 OFFICE O/P EST LOW 20 MIN: CPT | Performed by: INTERNAL MEDICINE

## 2023-10-17 RX ORDER — BUMETANIDE 1 MG/1
1 TABLET ORAL DAILY
Qty: 30 TABLET | Refills: 0 | Status: SHIPPED | OUTPATIENT
Start: 2023-10-17

## 2023-10-17 ASSESSMENT — ENCOUNTER SYMPTOMS
EYE DISCHARGE: 0
SORE THROAT: 0
BACK PAIN: 0
DIARRHEA: 0
COLOR CHANGE: 0
COUGH: 0
CHEST TIGHTNESS: 0
RHINORRHEA: 0
ABDOMINAL PAIN: 0
CONSTIPATION: 0
SHORTNESS OF BREATH: 0

## 2023-10-17 NOTE — PROGRESS NOTES
Velma Curran (:  1990) is a Established patient, here for evaluation of the followin [address]   Below is the assessment and plan developed based on review of pertinent history, physical exam, labs, studies, and medications. 1. Leg swelling  -     bumetanide (BUMEX) 1 MG tablet; Take 1 tablet by mouth daily, Disp-30 tablet, R-0Normal sent to pharmacy. I prescribed Bumex 1 mg. Potential side effects were discussed. 2. Acquired hypothyroidism  I recommend that she take 112 mcg dose of Unithroid. I recommend that she communicate this with Endocrinology as well as ask Endocrinology if she can add on cytomel. 3. Palpitations  I recommend that she take lower dose Unithyroid. .         Subjective   HPI    Patient presents today for leg swelling and palpitations. She states that she needed her thyroid medication adjusted. She was told to stop taking levothyroxine. Since stoping levothyroxine, she has had leg swelling. She is now taking unithroid 125 mcg. She states that she feels like she has had palpitations while at this dosage. She complains that she was having extreme anxiety on this dose. She had blood work recently. She has since decreased her dose to 112 mcg and her symptoms have improved. Patient Active Problem List   Diagnosis    Acquired hypothyroidism    H/O extrinsic asthma    Obesity, morbid ([address])    Environmental and seasonal allergies    Mild intermittent asthma with acute exacerbation        Current Outpatient Medications on File Prior to Visit   Medication Sig Dispense Refill    buPROPion (WELLBUTRIN XL) 150 MG extended release tablet TAKE 1 TABLET BY MOUTH EVERY DAY IN THE MORNING 30 tablet 2    UNITHROID 125 MCG tablet Take 1 tablet by mouth Daily 30 tablet 5    clindamycin-benzoyl peroxide (BENZACLIN) 1-5 % gel Apply topically 2 times daily.  (Patient not taking: Reported on 2023) 35 g 1    albuterol sulfate HFA

## 2023-10-23 RX ORDER — LIOTHYRONINE SODIUM 5 UG/1
5 TABLET ORAL DAILY
Qty: 90 TABLET | Refills: 3 | Status: SHIPPED | OUTPATIENT
Start: 2023-10-23

## 2023-10-23 RX ORDER — LEVOTHYROXINE SODIUM 125 UG/1
TABLET ORAL
Qty: 72 TABLET | Refills: 2 | Status: SHIPPED | OUTPATIENT
Start: 2023-10-23

## 2023-11-07 ENCOUNTER — OFFICE VISIT (OUTPATIENT)
Age: 33
End: 2023-11-07
Payer: COMMERCIAL

## 2023-11-07 VITALS
RESPIRATION RATE: 16 BRPM | DIASTOLIC BLOOD PRESSURE: 76 MMHG | BODY MASS INDEX: 44.59 KG/M2 | WEIGHT: 242.3 LBS | HEIGHT: 62 IN | OXYGEN SATURATION: 99 % | HEART RATE: 82 BPM | SYSTOLIC BLOOD PRESSURE: 113 MMHG

## 2023-11-07 DIAGNOSIS — E03.9 HYPOTHYROIDISM, UNSPECIFIED TYPE: Primary | ICD-10-CM

## 2023-11-07 DIAGNOSIS — Z23 NEED FOR VARICELLA VACCINE: ICD-10-CM

## 2023-11-07 DIAGNOSIS — Z23 NEED FOR TETANUS BOOSTER: ICD-10-CM

## 2023-11-07 DIAGNOSIS — Z11.4 ENCOUNTER FOR SCREENING FOR HIV: ICD-10-CM

## 2023-11-07 DIAGNOSIS — Z11.1 SCREENING-PULMONARY TB: Primary | ICD-10-CM

## 2023-11-07 DIAGNOSIS — Z11.1 SCREENING-PULMONARY TB: ICD-10-CM

## 2023-11-07 PROCEDURE — 99214 OFFICE O/P EST MOD 30 MIN: CPT | Performed by: INTERNAL MEDICINE

## 2023-11-07 RX ORDER — LIOTHYRONINE SODIUM 5 UG/1
5 TABLET ORAL DAILY
Qty: 90 TABLET | Refills: 3 | Status: SHIPPED | OUTPATIENT
Start: 2023-11-07

## 2023-11-07 RX ORDER — CHOLESTYRAMINE LIGHT 4 G/5.7G
POWDER, FOR SUSPENSION ORAL
COMMUNITY
Start: 2023-10-31 | End: 2023-11-07 | Stop reason: SDUPTHER

## 2023-11-07 RX ORDER — LEVOTHYROXINE SODIUM 0.1 MG/1
TABLET ORAL
COMMUNITY

## 2023-11-07 NOTE — PROGRESS NOTES
Chief Complaint   Patient presents with    Follow-up    Thyroid Problem        History of Present Illness: Kermit Ro is a  26 y.o. female with a past medical history significant for exercise-induced asthma, interstitial  cystitis and hypothyroidism seen in referral from Mikki Oseguera MD for discussion related to hypothyroidism. Previously:   Initial diagnosis of hypothyroidism was made 4-5 years ago. Was begun on levothyroxine initially. Started having bad anxiety so she was switched to levoxyl. In the past 2 months, liothyronine was added due to exhaustion despite normal labs. Describes  days when she could not get out of bed- 1 year. At night, does not sleep- could be exhausted and when she goes to bed she cannot sleep. Takes liothyronine at 0800. At night, she gets restless leg syndrome, takes magnesium to help with this. Also helps  with interstitial cystitis. Takes magnesium at night time. Muscle fatigue when she's dehydrated, does not have the energy- muscles feel drained. Has gotten to the point that she feels down, affects mood. No motivation to do anything. At night when she  tries to go to sleep, cannot rest. Can lay there for hours. Doesn't fall asleep until [id number]. Never had issues with this until she got pregnant with son. Prior to that had no issues going to sleep. Wants to finish school before having another child. Does have racing thoughts. Wakes up in the middle of the night. Does not snore. Does not wake up feeling rested. Days she takes bumetanide does not have restless legs. Around the cycle legs bother her. Levoxyl has calmed down anxiety. 07/12/2021: Discontinued levoxyl and liothyronine dut to sandpaper rash on chest, face, eyes were \"puffy\" Did not itch. Felt so much better  on liothyronine \"like night and day\" was getting back into a normal sleep routine. Since she's hasn't taken it hasn't slept at all. Felt awful on 100mcg, had to go to 112mcg.  Aches and pains/exhaustion

## 2023-11-08 DIAGNOSIS — E03.9 HYPOTHYROIDISM, UNSPECIFIED TYPE: Primary | ICD-10-CM

## 2023-11-08 DIAGNOSIS — E03.9 HYPOTHYROIDISM, UNSPECIFIED TYPE: ICD-10-CM

## 2023-11-08 LAB
T4 FREE SERPL-MCNC: 1.18 NG/DL (ref 0.82–1.77)
TSH SERPL DL<=0.005 MIU/L-ACNC: 3.45 UIU/ML (ref 0.45–4.5)

## 2023-11-17 DIAGNOSIS — B00.1 HERPESVIRAL VESICULAR DERMATITIS: ICD-10-CM

## 2023-11-17 RX ORDER — VALACYCLOVIR HYDROCHLORIDE 500 MG/1
TABLET, FILM COATED ORAL
Qty: 20 TABLET | Refills: 0 | Status: SHIPPED | OUTPATIENT
Start: 2023-11-17

## 2024-01-10 RX ORDER — LEVOTHYROXINE SODIUM 100 UG/1
TABLET ORAL
Qty: 90 TABLET | Refills: 3 | Status: SHIPPED | OUTPATIENT
Start: 2024-01-10 | End: 2024-01-11 | Stop reason: SDUPTHER

## 2024-01-10 RX ORDER — LEVOTHYROXINE SODIUM 112 UG/1
112 TABLET ORAL
Qty: 90 TABLET | Refills: 2 | OUTPATIENT
Start: 2024-01-10

## 2024-01-11 RX ORDER — LEVOTHYROXINE SODIUM 100 UG/1
100 TABLET ORAL DAILY
Qty: 90 TABLET | Refills: 3 | Status: SHIPPED | OUTPATIENT
Start: 2024-01-11

## 2024-02-08 DIAGNOSIS — B00.1 HERPESVIRAL VESICULAR DERMATITIS: ICD-10-CM

## 2024-02-08 RX ORDER — VALACYCLOVIR HYDROCHLORIDE 500 MG/1
TABLET, FILM COATED ORAL
Qty: 20 TABLET | Refills: 0 | Status: SHIPPED | OUTPATIENT
Start: 2024-02-08

## 2024-06-11 ENCOUNTER — TELEMEDICINE (OUTPATIENT)
Age: 34
End: 2024-06-11
Payer: COMMERCIAL

## 2024-06-11 DIAGNOSIS — E55.9 VITAMIN D DEFICIENCY: ICD-10-CM

## 2024-06-11 DIAGNOSIS — E03.9 HYPOTHYROIDISM, UNSPECIFIED TYPE: Primary | ICD-10-CM

## 2024-06-11 PROCEDURE — 99214 OFFICE O/P EST MOD 30 MIN: CPT | Performed by: INTERNAL MEDICINE

## 2024-06-11 PROCEDURE — G2211 COMPLEX E/M VISIT ADD ON: HCPCS | Performed by: INTERNAL MEDICINE

## 2024-06-11 NOTE — PROGRESS NOTES
affect, Normal insight and judgement       Data Reviewed:             Lab Results   Component Value Date    TSH 3.450 11/07/2023    U6XWIJI 8.4 05/04/2020    T4FREE 1.18 11/07/2023        Results for HUONG FRIEDMAN (MRN [id number]) as of 5/14/2021 11:56           Ref. Range  1/28/2021 10:16     Cortisol, a.m.  Latest Ref Range: 4.30 - 22.45 ug/dL  11.6          Results for HUONG FRIEDMAN (MRN [id number]) as of 5/14/2021 11:56              Ref. Range  6/27/2019 15:23  1/16/2020 11:26  1/6/2021 15:13  1/28/2021 10:16     Sodium  Latest Ref Range: 136 - 145 mmol/L  140  139  136  135 (L)       Lab Results   Component Value Date    TSH 3.450 11/07/2023    [id number] 8.4 05/04/2020    T4FREE 1.18 11/07/2023           Assessment/Plan: This is a very pleasant 33 y.o. female with past medical history significant for interstitial cystitis seen infollow up for discussion related to thyroid hormone supplementation.  Previously trialed Cytomel 5 mcg and Levoxyl to 100  mcg to see if this improved energy and it did significantly, pt stated it was \"like night and day\" and that she could \"finally sleep\".  Developed a rash around the time of cytomel use so discontinued it and has been feeling very poor again for the past  few days. We discussed the fact that there is no cytomel alternative unfortunately. Has since retrialed cytomel (made pt aware that if she experiences shortness or breath/swelling as occurred previously, will need to call the ambulance, she does not have  an epi-pen at home) with resolution of the rash. Reviewed the fact that a normal Free T4 level suggests that brain fog, weakness in the lower extremities, and fatigue is not suggestive of hypOthyroidism causing these symptoms. Aug 2023 switch to synthroid only.  Medicaid apparently pays for Levoxyl, as of November 2023 feels very well on 100 mcg of Levoxyl +5 mcg of Cytomel daily. As of June 2024, switch back to namebrand synthroid pending labs.      #Hypothyroidism,

## 2024-06-12 LAB
25(OH)D3+25(OH)D2 SERPL-MCNC: 24.6 NG/ML (ref 30–100)
HBA1C MFR BLD: 5.3 % (ref 4.8–5.6)
T4 FREE SERPL-MCNC: 1.28 NG/DL (ref 0.82–1.77)
TSH SERPL DL<=0.005 MIU/L-ACNC: 3.71 UIU/ML (ref 0.45–4.5)

## 2024-07-25 DIAGNOSIS — B00.1 HERPESVIRAL VESICULAR DERMATITIS: ICD-10-CM

## 2024-07-26 RX ORDER — VALACYCLOVIR HYDROCHLORIDE 500 MG/1
TABLET, FILM COATED ORAL
Qty: 20 TABLET | Refills: 0 | Status: SHIPPED | OUTPATIENT
Start: 2024-07-26

## 2024-09-06 DIAGNOSIS — F34.1 DYSTHYMIC DISORDER: ICD-10-CM

## 2024-09-08 RX ORDER — BUPROPION HYDROCHLORIDE 150 MG/1
150 TABLET ORAL EVERY MORNING
Qty: 30 TABLET | Refills: 2 | Status: SHIPPED | OUTPATIENT
Start: 2024-09-08

## 2024-09-30 ENCOUNTER — HOSPITAL ENCOUNTER (OUTPATIENT)
Facility: HOSPITAL | Age: 34
Discharge: HOME | End: 2024-10-03
Attending: INTERNAL MEDICINE
Payer: COMMERCIAL

## 2024-09-30 ENCOUNTER — OFFICE VISIT (OUTPATIENT)
Dept: PRIMARY CARE CLINIC | Facility: CLINIC | Age: 34
End: 2024-09-30
Payer: COMMERCIAL

## 2024-09-30 VITALS
WEIGHT: 252 LBS | TEMPERATURE: 97 F | BODY MASS INDEX: 46.38 KG/M2 | RESPIRATION RATE: 18 BRPM | HEIGHT: 62 IN | DIASTOLIC BLOOD PRESSURE: 78 MMHG | SYSTOLIC BLOOD PRESSURE: 134 MMHG | HEART RATE: 86 BPM | OXYGEN SATURATION: 99 %

## 2024-09-30 DIAGNOSIS — E03.9 ACQUIRED HYPOTHYROIDISM: ICD-10-CM

## 2024-09-30 DIAGNOSIS — R06.09 EXERTIONAL DYSPNEA: ICD-10-CM

## 2024-09-30 DIAGNOSIS — M79.10 MYALGIA: ICD-10-CM

## 2024-09-30 DIAGNOSIS — F43.21 GRIEF REACTION: ICD-10-CM

## 2024-09-30 DIAGNOSIS — R13.19 ESOPHAGEAL DYSPHAGIA: ICD-10-CM

## 2024-09-30 DIAGNOSIS — G89.29 CHRONIC RIGHT SHOULDER PAIN: ICD-10-CM

## 2024-09-30 DIAGNOSIS — M25.511 CHRONIC RIGHT SHOULDER PAIN: ICD-10-CM

## 2024-09-30 DIAGNOSIS — R00.2 HEART PALPITATIONS: Primary | ICD-10-CM

## 2024-09-30 PROCEDURE — 93000 ELECTROCARDIOGRAM COMPLETE: CPT | Performed by: INTERNAL MEDICINE

## 2024-09-30 PROCEDURE — 71046 X-RAY EXAM CHEST 2 VIEWS: CPT

## 2024-09-30 PROCEDURE — 99214 OFFICE O/P EST MOD 30 MIN: CPT | Performed by: INTERNAL MEDICINE

## 2024-09-30 SDOH — ECONOMIC STABILITY: FOOD INSECURITY: WITHIN THE PAST 12 MONTHS, THE FOOD YOU BOUGHT JUST DIDN'T LAST AND YOU DIDN'T HAVE MONEY TO GET MORE.: NEVER TRUE

## 2024-09-30 SDOH — ECONOMIC STABILITY: INCOME INSECURITY: HOW HARD IS IT FOR YOU TO PAY FOR THE VERY BASICS LIKE FOOD, HOUSING, MEDICAL CARE, AND HEATING?: NOT HARD AT ALL

## 2024-09-30 SDOH — ECONOMIC STABILITY: FOOD INSECURITY: WITHIN THE PAST 12 MONTHS, YOU WORRIED THAT YOUR FOOD WOULD RUN OUT BEFORE YOU GOT MONEY TO BUY MORE.: NEVER TRUE

## 2024-09-30 ASSESSMENT — ENCOUNTER SYMPTOMS
CONSTIPATION: 0
COUGH: 0
DIARRHEA: 0
RHINORRHEA: 0
CHEST TIGHTNESS: 0
BACK PAIN: 0
TROUBLE SWALLOWING: 1
SORE THROAT: 1
ABDOMINAL PAIN: 0
EYE DISCHARGE: 0
COLOR CHANGE: 0
SHORTNESS OF BREATH: 1

## 2024-09-30 ASSESSMENT — PATIENT HEALTH QUESTIONNAIRE - PHQ9
SUM OF ALL RESPONSES TO PHQ QUESTIONS 1-9: 2
SUM OF ALL RESPONSES TO PHQ9 QUESTIONS 1 & 2: 2
SUM OF ALL RESPONSES TO PHQ QUESTIONS 1-9: 2
2. FEELING DOWN, DEPRESSED OR HOPELESS: SEVERAL DAYS
1. LITTLE INTEREST OR PLEASURE IN DOING THINGS: SEVERAL DAYS
SUM OF ALL RESPONSES TO PHQ QUESTIONS 1-9: 2
SUM OF ALL RESPONSES TO PHQ QUESTIONS 1-9: 2

## 2024-09-30 NOTE — PROGRESS NOTES
\"Have you been to the ER, urgent care clinic since your last visit?  Hospitalized since your last visit?\"    NO      “Have you seen or consulted any other health care providers outside our system since your last visit?”    NO       “Have you had a pap smear?”    20[facility]   Record has been requested via fax.      Chief Complaint   Patient presents with    Palpitations     Heart    Muscle aches     Right shoulder/arm muscle aches/burning feeling.       Joint Pain    Throat problem     For the past 2 months feels like something is stuck in her throat.        Pt is ok with both scribes.   
Breath sounds: Normal breath sounds. No wheezing.   Abdominal:      General: Bowel sounds are normal.      Palpations: Abdomen is soft.      Tenderness: There is no abdominal tenderness.   Musculoskeletal:      Cervical back: Normal range of motion and neck supple.   Lymphadenopathy:      Cervical: No cervical adenopathy.   Neurological:      Mental Status: She is alert and oriented to person, place, and time.   Psychiatric:         Mood and Affect: Mood normal.            Lucas BRYAN, am scribing for and in the presence of Alfreda Martini MD. 9/30/24/9:55 AM EDT  Alfreda BRYAN MD, personally performed the services described by my scribe in my presence, and it is both accurate and complete.    An electronic signature was used to authenticate this note.    --Lucas Love

## 2024-10-22 DIAGNOSIS — B00.1 HERPESVIRAL VESICULAR DERMATITIS: ICD-10-CM

## 2024-10-22 RX ORDER — VALACYCLOVIR HYDROCHLORIDE 500 MG/1
TABLET, FILM COATED ORAL
Qty: 20 TABLET | Refills: 0 | Status: SHIPPED | OUTPATIENT
Start: 2024-10-22

## 2024-10-31 ENCOUNTER — ANESTHESIA EVENT (OUTPATIENT)
Facility: HOSPITAL | Age: 34
End: 2024-10-31
Payer: COMMERCIAL

## 2024-10-31 ENCOUNTER — ANESTHESIA (OUTPATIENT)
Facility: HOSPITAL | Age: 34
End: 2024-10-31
Payer: COMMERCIAL

## 2024-10-31 ENCOUNTER — HOSPITAL ENCOUNTER (OUTPATIENT)
Facility: HOSPITAL | Age: 34
Setting detail: OUTPATIENT SURGERY
Discharge: HOME | End: 2024-10-31
Attending: INTERNAL MEDICINE | Admitting: INTERNAL MEDICINE
Payer: COMMERCIAL

## 2024-10-31 VITALS
WEIGHT: 250.6 LBS | HEIGHT: 62 IN | HEART RATE: 65 BPM | SYSTOLIC BLOOD PRESSURE: 141 MMHG | OXYGEN SATURATION: 100 % | TEMPERATURE: 97.9 F | RESPIRATION RATE: 15 BRPM | BODY MASS INDEX: 46.12 KG/M2 | DIASTOLIC BLOOD PRESSURE: 90 MMHG

## 2024-10-31 LAB — HCG UR QL: NEGATIVE

## 2024-10-31 PROCEDURE — 7100000010 HC PHASE II RECOVERY - FIRST 15 MIN: Performed by: INTERNAL MEDICINE

## 2024-10-31 PROCEDURE — 7100000011 HC PHASE II RECOVERY - ADDTL 15 MIN: Performed by: INTERNAL MEDICINE

## 2024-10-31 PROCEDURE — 2580000003 HC RX 258: Performed by: INTERNAL MEDICINE

## 2024-10-31 PROCEDURE — 2500000003 HC RX 250 WO HCPCS: Performed by: ANESTHESIOLOGY

## 2024-10-31 PROCEDURE — 3700000000 HC ANESTHESIA ATTENDED CARE: Performed by: INTERNAL MEDICINE

## 2024-10-31 PROCEDURE — 3600007502: Performed by: INTERNAL MEDICINE

## 2024-10-31 PROCEDURE — C1726 CATH, BAL DIL, NON-VASCULAR: HCPCS | Performed by: INTERNAL MEDICINE

## 2024-10-31 PROCEDURE — 88342 IMHCHEM/IMCYTCHM 1ST ANTB: CPT

## 2024-10-31 PROCEDURE — 2709999900 HC NON-CHARGEABLE SUPPLY: Performed by: INTERNAL MEDICINE

## 2024-10-31 PROCEDURE — 6360000002 HC RX W HCPCS: Performed by: ANESTHESIOLOGY

## 2024-10-31 PROCEDURE — 88305 TISSUE EXAM BY PATHOLOGIST: CPT

## 2024-10-31 PROCEDURE — 3600007512: Performed by: INTERNAL MEDICINE

## 2024-10-31 PROCEDURE — 81025 URINE PREGNANCY TEST: CPT

## 2024-10-31 PROCEDURE — 3700000001 HC ADD 15 MINUTES (ANESTHESIA): Performed by: INTERNAL MEDICINE

## 2024-10-31 RX ORDER — GLYCOPYRROLATE 0.2 MG/ML
INJECTION INTRAMUSCULAR; INTRAVENOUS
Status: DISCONTINUED | OUTPATIENT
Start: 2024-10-31 | End: 2024-10-31 | Stop reason: SDUPTHER

## 2024-10-31 RX ORDER — LIDOCAINE HYDROCHLORIDE 20 MG/ML
INJECTION, SOLUTION EPIDURAL; INFILTRATION; INTRACAUDAL; PERINEURAL
Status: DISCONTINUED | OUTPATIENT
Start: 2024-10-31 | End: 2024-10-31 | Stop reason: SDUPTHER

## 2024-10-31 RX ORDER — SODIUM CHLORIDE 0.9 % (FLUSH) 0.9 %
5-40 SYRINGE (ML) INJECTION EVERY 12 HOURS SCHEDULED
Status: DISCONTINUED | OUTPATIENT
Start: 2024-10-31 | End: 2024-10-31 | Stop reason: HOSPADM

## 2024-10-31 RX ORDER — SODIUM CHLORIDE 0.9 % (FLUSH) 0.9 %
5-40 SYRINGE (ML) INJECTION PRN
Status: DISCONTINUED | OUTPATIENT
Start: 2024-10-31 | End: 2024-10-31 | Stop reason: HOSPADM

## 2024-10-31 RX ORDER — CHOLESTYRAMINE 4 G/5.5G
POWDER, FOR SUSPENSION ORAL
COMMUNITY
Start: 2024-10-24

## 2024-10-31 RX ORDER — SODIUM CHLORIDE 9 MG/ML
INJECTION, SOLUTION INTRAVENOUS PRN
Status: DISCONTINUED | OUTPATIENT
Start: 2024-10-31 | End: 2024-10-31 | Stop reason: HOSPADM

## 2024-10-31 RX ADMIN — LIDOCAINE HYDROCHLORIDE 100 MG: 20 INJECTION, SOLUTION EPIDURAL; INFILTRATION; INTRACAUDAL; PERINEURAL at 14:57

## 2024-10-31 RX ADMIN — SODIUM CHLORIDE: 9 INJECTION, SOLUTION INTRAVENOUS at 14:25

## 2024-10-31 RX ADMIN — PROPOFOL 500 MG: 10 INJECTION, EMULSION INTRAVENOUS at 15:21

## 2024-10-31 RX ADMIN — GLYCOPYRROLATE 0.2 MG: 0.2 INJECTION, SOLUTION INTRAMUSCULAR; INTRAVENOUS at 14:57

## 2024-10-31 ASSESSMENT — PAIN DESCRIPTION - DESCRIPTORS: DESCRIPTORS: SORE

## 2024-10-31 ASSESSMENT — PAIN SCALES - GENERAL: PAINLEVEL_OUTOF10: 2

## 2024-10-31 ASSESSMENT — PAIN DESCRIPTION - LOCATION: LOCATION: MOUTH

## 2024-10-31 ASSESSMENT — PAIN - FUNCTIONAL ASSESSMENT: PAIN_FUNCTIONAL_ASSESSMENT: NONE - DENIES PAIN

## 2024-10-31 NOTE — DISCHARGE INSTRUCTIONS
[place] Office: [phone]    Catie Jackson  [id number]  1990    EGD/COLONOSCOPY DISCHARGE INSTRUCTIONS    CALL M.D.  ANY SIGN OF   Increasing pain, nausea, vomiting  Abdominal distension (swelling)  New increased bleeding (oral or rectal)  Fever (chills)  Pain in chest area  Bloody discharge from nose or mouth  Shortness of breath    For 24 hours after general anesthesia or intravenous analgesia / sedation  you may experience:  Drowsiness, dizziness, sleepiness, or confusion  Difficulty remembering or delayed reaction times  Difficulty with your balance, especially while walking, move slowly and carefully, do not make sudden position changes  Difficulty focusing or blurred vision    Discomfort:  Sore throat- throat lozenges or warm salt water gargle  redness at IV site- apply warm compress to area; if redness or soreness persist- contact your physician  Gaseous discomfort- walking, belching will help relieve any discomfort    DIET  You may resume your regular diet - however -  remember your colon is empty and a heavy meal will produce gas.   Avoid these foods:  fried / greasy foods, excessive carbonated drinks or too much caffeine    MEDICATIONS   You may not take any Advil, Aspirin, Ibuprofen, Motrin or Aleve(NSAIDs) for 7 days, ONLY  Tylenol as needed for pain.    ACTIVITY  You may resume your normal daily activities.   Spend the remainder of the day resting -  avoid any strenuous activity.  You may not operate a vehicle for 12 hours  You may not engage in an occupation involving machinery or appliances for rest of today.  You may not drink alcoholic beverages for at least 12 hours  Avoid making any critical decisions for at least 24 hour        Findings:    Patient had transient hypoxia secondary to laryngeal spasm.    Scope was removed.  Patient was given bag valve ventilation by anesthesia.    The procedure was then started again. See anesthesia records please.     Esophagus:  The esophageal mucosa in  or odor of or around the surgical area  Temperature over 100.5  Nausea and vomiting lasting longer than 4 hours or if unable to take medications  Any signs of decreased circulation or nerve impairment to extremity: change in color, persistent numbness, tingling, coldness or increase pain  Any questions or concerns    IF YOU REPORT TO AN EMERGENCY ROOM, DOCTOR'S OFFICE OR HOSPITAL WITHIN 24 HOURS AFTER YOUR PROCEDURE, BRING THIS SHEET AND YOUR AFTER VISIT SUMMARY WITH YOU AND GIVE IT TO THE PHYSICIAN OR NURSE ATTENDING YOU.

## 2024-10-31 NOTE — H&P
Pre-endoscopy H and P    The patient was seen and examined in the room/pre-op holding area.  The airway was assessed and documented.  The problem list, past medical history, and medications were reviewed.     Patient Active Problem List   Diagnosis    Acquired hypothyroidism    H/O extrinsic asthma    Obesity, morbid    Environmental and seasonal allergies    Mild intermittent asthma with acute exacerbation     Social History     Socioeconomic History    Marital status: Single     Spouse name: Not on file    Number of children: Not on file    Years of education: Not on file    Highest education level: Not on file   Occupational History    Not on file   Tobacco Use    Smoking status: Never    Smokeless tobacco: Never   Vaping Use    Vaping status: Never Used   Substance and Sexual Activity    Alcohol use: No    Drug use: No    Sexual activity: Not on file   Other Topics Concern    Not on file   Social History Narrative    Not on file     Social Determinants of Health     Financial Resource Strain: Low Risk  (9/30/2024)    Overall Financial Resource Strain (CARDIA)     Difficulty of Paying Living Expenses: Not hard at all   Food Insecurity: No Food Insecurity (9/30/2024)    Hunger Vital Sign     Worried About Running Out of Food in the Last Year: Never true     Ran Out of Food in the Last Year: Never true   Transportation Needs: Unknown (9/30/2024)    PRAPARE - Transportation     Lack of Transportation (Medical): Not on file     Lack of Transportation (Non-Medical): No   Physical Activity: Not on file   Stress: Not on file   Social Connections: Not on file   Intimate Partner Violence: Not on file   Housing Stability: Unknown (9/30/2024)    Housing Stability Vital Sign     Unable to Pay for Housing in the Last Year: Not on file     Number of Times Moved in the Last Year: Not on file     Homeless in the Last Year: No     Past Medical History:   Diagnosis Date    Asthma     \"triggered by being sick\"    Hypothyroid

## 2024-10-31 NOTE — ANESTHESIA POSTPROCEDURE EVALUATION
Department of Anesthesiology  Postprocedure Note    Patient: Catie Jackson  MRN: [id number]  YOB: 1990  Date of evaluation: 10/31/2024    Procedure Summary       Date: 10/31/24 Room / Location: [facility] ENDO 04 / [facility] ENDOSCOPY    Anesthesia Start: 1453 Anesthesia Stop: 1525    Procedure: ESOPHAGOGASTRODUODENOSCOPY (Upper GI Region) Diagnosis:       Gastroesophageal reflux disease, unspecified whether esophagitis present      Dysphagia, unspecified type      Hiatal hernia      (Gastroesophageal reflux disease, unspecified whether esophagitis present [K21.9])      (Dysphagia, unspecified type [R13.10])      (Hiatal hernia [K44.9])    Surgeons: Wilbert Khan MD Responsible Provider: Mackenzie Mayfield DO    Anesthesia Type: TIVA ASA Status: 2            Anesthesia Type: TIVA    Dena Phase I: Dena Score: 10    Dena Phase II: Dena Score: 10    Anesthesia Post Evaluation    Patient location during evaluation: PACU  Patient participation: complete - patient participated  Level of consciousness: awake  Airway patency: patent  Nausea & Vomiting: no vomiting and nausea  Cardiovascular status: hemodynamically stable  Respiratory status: acceptable  Hydration status: euvolemic    No notable events documented.

## 2024-10-31 NOTE — PROGRESS NOTES
CRE balloon dilatation of the esophagus     18 mm Balloon inflated to 7 ATMs and held for 60 seconds.    No subcutaneous crepitus of the chest or cervical region was noted post dilatation.        Endoscope was pre-cleaned at bedside immediately following procedure by MARCE Shankar    Glasses returned to patient.

## 2024-10-31 NOTE — ANESTHESIA PRE PROCEDURE
Department of Anesthesiology  Preprocedure Note       Name:  Catie Jackson   Age:  34 y.o.  :  1990                                          MRN:  [id number]         Date:  10/31/2024      Surgeon: Surgeon(s):  Wilbert Khan MD    Procedure: Procedure(s):  ESOPHAGOGASTRODUODENOSCOPY    Medications prior to admission:   Prior to Admission medications    Medication Sig Start Date End Date Taking? Authorizing Provider   valACYclovir (VALTREX) 500 MG tablet TAKE 1 TABLET BY MOUTH TWO (2) TIMES A DAY FOR 20 DOSES. 10/22/24  Yes Alfreda Martini MD   buPROPion (WELLBUTRIN XL) 150 MG extended release tablet TAKE 1 TABLET BY MOUTH EVERY DAY IN THE MORNING 24  Yes Alfreda Martini MD   LEVOXYL 100 MCG tablet Take 1 tablet by mouth Daily Levoxyl 24  Yes Luz Mcdonald MD   cholestyramine (QUESTRAN) 4 g packet Take 1 packet by mouth as needed   Yes Automatic Reconciliation, Ar       Current medications:    No current facility-administered medications for this encounter.     Current Outpatient Medications   Medication Sig Dispense Refill   • valACYclovir (VALTREX) 500 MG tablet TAKE 1 TABLET BY MOUTH TWO (2) TIMES A DAY FOR 20 DOSES. 20 tablet 0   • buPROPion (WELLBUTRIN XL) 150 MG extended release tablet TAKE 1 TABLET BY MOUTH EVERY DAY IN THE MORNING 30 tablet 2   • LEVOXYL 100 MCG tablet Take 1 tablet by mouth Daily Levoxyl 90 tablet 3   • cholestyramine (QUESTRAN) 4 g packet Take 1 packet by mouth as needed         Allergies:    Allergies   Allergen Reactions   • Shellfish Allergy Swelling     \"My mouth & throat swell\"     • Amoxicillin-Pot Clavulanate Rash   • Banana Nausea And Vomiting and Other (See Comments)   • Cephalexin Rash       Problem List:    Patient Active Problem List   Diagnosis Code   • Acquired hypothyroidism E03.9   • H/O extrinsic asthma Z87.09   • Obesity, morbid E66.01   • Environmental and seasonal allergies J30.89   • Mild intermittent asthma with acute exacerbation J45.21

## 2024-10-31 NOTE — OP NOTE
[place] Office: [phone]      Esophagogastroduodenoscopy Procedure Note      Catei Jackson  1990  [id number]    Indication:  Dysphagia      : Dony Khan MD    Referring Provider:  Alfreda Martini MD    Sedation:  MAC anesthesia    Procedure Details:  After detailed informed consent was obtained for the procedure, with all risks and benefits of procedure explained the patient was taken to the endoscopy suite and placed in the left lateral decubitus position.  Following sequential administration of sedation as per above, the endoscope was inserted into the mouth and advanced under direct vision to second portion of the duodenum.  A careful inspection was made as the gastroscope was withdrawn, including a retroflexed view of the proximal stomach; findings and interventions are described below.      Findings:   Patient had transient hypoxia secondary to laryngeal spasm.    Scope was removed.  Patient was given bag valve ventilation by anesthesia.    The procedure was then started again. See anesthesia records please.    Esophagus:  The esophageal mucosa in the proximal, mid and distal esophagus is normal.   On withdrawal of the scope multiple biopsies were taken from the proximal and mid-esophagus to rule out eosinophilic esophagitis.  A small transiently appearing 1 cm hiatal hernia is noted.  GE junction has erythema and was biopsied separately.  The squamo-columnar junction is at 39 cm where the Z-line was noted.   Through-the-scope CRE fixed wire balloon was then used and the esophagus was dilated to 18 mm for 1 minute without any complications. Balloon was pulled out inflated. Patient tolerated procedure well.    Stomach:   Proximal stomach takes an S-curve.  The gastric mucosa has erythema in the body and antrum.  Multiple biopsies were obtained.  The fundus was found to be normal with no lesions noted on retroflexion.  Endoscopic grading of gastroesophageal flap valve (GEFV)/Hill grade:

## 2024-10-31 NOTE — PROGRESS NOTES
ARRIVAL INFORMATION:  Verified patient name and date of birth, scheduled procedure, and informed consent.     : [place] () contact number: [phone]  Physician and staff can share information with the .     Receive texts: Yes    Belongings with patient include:  Clothing,Glasses    GI FOCUSED ASSESSMENT:  Neuro: Awake, alert, oriented x4  Respiratory: even and unlabored   GI: soft and non-distended  EKG Rhythm: normal sinus rhythm    Education:Reviewed general discharge instructions and  information.      The risks and benefits of the bite block have been explained to patient.  Patient verbalizes understanding.

## 2024-12-11 ENCOUNTER — OFFICE VISIT (OUTPATIENT)
Age: 34
End: 2024-12-11

## 2024-12-11 VITALS
SYSTOLIC BLOOD PRESSURE: 118 MMHG | HEART RATE: 79 BPM | WEIGHT: 253.4 LBS | RESPIRATION RATE: 16 BRPM | DIASTOLIC BLOOD PRESSURE: 82 MMHG | OXYGEN SATURATION: 96 % | BODY MASS INDEX: 46.63 KG/M2 | HEIGHT: 62 IN

## 2024-12-11 DIAGNOSIS — E03.9 HYPOTHYROIDISM, UNSPECIFIED TYPE: Primary | ICD-10-CM

## 2024-12-11 DIAGNOSIS — F43.10 PTSD (POST-TRAUMATIC STRESS DISORDER): ICD-10-CM

## 2024-12-11 RX ORDER — LEVOTHYROXINE SODIUM 100 UG/1
100 TABLET ORAL DAILY
Qty: 90 TABLET | Refills: 3 | Status: SHIPPED | OUTPATIENT
Start: 2024-12-11

## 2024-12-11 NOTE — PROGRESS NOTES
Chief Complaint   Patient presents with    Thyroid Problem        History of Present Illness: Catie Jackson is a  34 y.o. female with a past medical history significant for exercise-induced asthma, interstitial  cystitis and hypothyroidism seen in referral from Alfreda Martini MD for discussion related to hypothyroidism.      Previously:   Initial diagnosis of hypothyroidism was made 4-5 years ago. Was begun on levothyroxine initially. Started having bad anxiety so she was switched to levoxyl. In the past 2 months, liothyronine was added due to exhaustion despite normal labs. Describes  days when she could not get out of bed- 1 year. At night, does not sleep- could be exhausted and when she goes to bed she cannot sleep. Takes liothyronine at 0800. At night, she gets restless leg syndrome, takes magnesium to help with this. Also helps  with interstitial cystitis. Takes magnesium at night time. Muscle fatigue when she's dehydrated, does not have the energy- muscles feel drained. Has gotten to the point that she feels down, affects mood. No motivation to do anything. At night when she  tries to go to sleep, cannot rest. Can lay there for hours. Doesn't fall asleep until [id number]. Never had issues with this until she got pregnant with son. Prior to that had no issues going to sleep. Wants to finish school before having another child.  Does have racing thoughts. Wakes up in the middle of the night. Does not snore. Does not wake up feeling rested. Days she takes bumetanide does not have restless legs. Around the cycle legs bother her. Levoxyl has calmed down anxiety.         07/12/2021: Discontinued levoxyl and liothyronine dut to sandpaper rash on chest, face, eyes were \"puffy\" Did not itch. Felt so much better  on liothyronine \"like night and day\" was getting back into a normal sleep routine. Since she's hasn't taken it hasn't slept at all. Felt awful on 100mcg, had to go to 112mcg. Aches and pains/exhaustion had resolved.

## 2025-02-27 LAB
C. TRACHOMATIS, EXTERNAL RESULT: NEGATIVE
HEP B, EXTERNAL RESULT: NON REACTIVE
HIV, EXTERNAL RESULT: NON REACTIVE
N. GONORRHOEAE, EXTERNAL RESULT: NEGATIVE
RPR, EXTERNAL RESULT: NON REACTIVE
RUBELLA TITER, EXTERNAL RESULT: NORMAL

## 2025-03-05 LAB
T4 FREE SERPL-MCNC: 0.83 NG/DL (ref 0.82–1.77)
TSH SERPL DL<=0.005 MIU/L-ACNC: 4.32 UIU/ML (ref 0.45–4.5)

## 2025-03-12 RX ORDER — LEVOTHYROXINE SODIUM 125 UG/1
125 TABLET ORAL DAILY
Qty: 30 TABLET | Refills: 8 | Status: SHIPPED | OUTPATIENT
Start: 2025-03-12 | End: 2025-03-12

## 2025-03-12 RX ORDER — LEVOTHYROXINE SODIUM 125 UG/1
125 TABLET ORAL DAILY
Qty: 30 TABLET | Refills: 8 | Status: SHIPPED | OUTPATIENT
Start: 2025-03-12

## 2025-08-08 LAB — GBS, EXTERNAL RESULT: POSITIVE

## 2025-08-25 ENCOUNTER — HOSPITAL ENCOUNTER (OUTPATIENT)
Facility: HOSPITAL | Age: 35
Setting detail: OBSERVATION
Discharge: HOME | End: 2025-08-25
Attending: STUDENT IN AN ORGANIZED HEALTH CARE EDUCATION/TRAINING PROGRAM | Admitting: OBSTETRICS & GYNECOLOGY
Payer: COMMERCIAL

## 2025-08-25 VITALS
HEART RATE: 109 BPM | TEMPERATURE: 97.9 F | OXYGEN SATURATION: 99 % | DIASTOLIC BLOOD PRESSURE: 79 MMHG | RESPIRATION RATE: 16 BRPM | SYSTOLIC BLOOD PRESSURE: 126 MMHG

## 2025-08-25 PROBLEM — O16.3 ELEVATED BLOOD PRESSURE AFFECTING PREGNANCY IN THIRD TRIMESTER, ANTEPARTUM: Status: ACTIVE | Noted: 2025-08-25

## 2025-08-25 LAB
ALBUMIN SERPL-MCNC: 2.5 G/DL (ref 3.5–5.2)
ALBUMIN/GLOB SERPL: 0.7 (ref 1.1–2.2)
ALP SERPL-CCNC: 117 U/L (ref 35–104)
ALT SERPL-CCNC: 11 U/L (ref 10–35)
ANION GAP SERPL CALC-SCNC: 14 MMOL/L (ref 2–14)
AST SERPL-CCNC: 16 U/L (ref 10–35)
BASOPHILS # BLD: 0.04 K/UL (ref 0–0.1)
BASOPHILS NFR BLD: 0.3 % (ref 0–1)
BILIRUB SERPL-MCNC: 0.2 MG/DL (ref 0–1.2)
BUN SERPL-MCNC: 7 MG/DL (ref 6–20)
BUN/CREAT SERPL: 13 (ref 12–20)
CALCIUM SERPL-MCNC: 9.3 MG/DL (ref 8.6–10)
CHLORIDE SERPL-SCNC: 102 MMOL/L (ref 98–107)
CO2 SERPL-SCNC: 18 MMOL/L (ref 20–29)
CREAT SERPL-MCNC: 0.57 MG/DL (ref 0.6–1)
CREAT UR-MCNC: 171 MG/DL (ref 28–217)
DIFFERENTIAL METHOD BLD: ABNORMAL
EOSINOPHIL # BLD: 0.11 K/UL (ref 0–0.4)
EOSINOPHIL NFR BLD: 0.9 % (ref 0–7)
ERYTHROCYTE [DISTWIDTH] IN BLOOD BY AUTOMATED COUNT: 15.4 % (ref 11.5–14.5)
GLOBULIN SER CALC-MCNC: 3.4 G/DL (ref 2–4)
GLUCOSE SERPL-MCNC: 141 MG/DL (ref 65–100)
HCT VFR BLD AUTO: 34 % (ref 35–47)
HGB BLD-MCNC: 11.3 G/DL (ref 11.5–16)
IMM GRANULOCYTES # BLD AUTO: 0.1 K/UL (ref 0–0.04)
IMM GRANULOCYTES NFR BLD AUTO: 0.9 % (ref 0–0.5)
LYMPHOCYTES # BLD: 1.83 K/UL (ref 0.8–3.5)
LYMPHOCYTES NFR BLD: 15.7 % (ref 12–49)
MCH RBC QN AUTO: 27.3 PG (ref 26–34)
MCHC RBC AUTO-ENTMCNC: 33.2 G/DL (ref 30–36.5)
MCV RBC AUTO: 82.1 FL (ref 80–99)
MONOCYTES # BLD: 0.54 K/UL (ref 0–1)
MONOCYTES NFR BLD: 4.6 % (ref 5–13)
NEUTS SEG # BLD: 9.02 K/UL (ref 1.8–8)
NEUTS SEG NFR BLD: 77.6 % (ref 32–75)
NRBC # BLD: 0 K/UL (ref 0–0.01)
NRBC BLD-RTO: 0 PER 100 WBC
PLATELET # BLD AUTO: 289 K/UL (ref 150–400)
PMV BLD AUTO: 12.1 FL (ref 8.9–12.9)
POTASSIUM SERPL-SCNC: 4.1 MMOL/L (ref 3.5–5.1)
PROT SERPL-MCNC: 5.8 G/DL (ref 6.4–8.3)
PROT UR-MCNC: 20 MG/DL
PROT/CREAT UR-RTO: 0.1
RBC # BLD AUTO: 4.14 M/UL (ref 3.8–5.2)
SODIUM SERPL-SCNC: 134 MMOL/L (ref 136–145)
WBC # BLD AUTO: 11.6 K/UL (ref 3.6–11)

## 2025-08-25 PROCEDURE — 86850 RBC ANTIBODY SCREEN: CPT

## 2025-08-25 PROCEDURE — 80053 COMPREHEN METABOLIC PANEL: CPT

## 2025-08-25 PROCEDURE — 84156 ASSAY OF PROTEIN URINE: CPT

## 2025-08-25 PROCEDURE — 82570 ASSAY OF URINE CREATININE: CPT

## 2025-08-25 PROCEDURE — 86901 BLOOD TYPING SEROLOGIC RH(D): CPT

## 2025-08-25 PROCEDURE — 85025 COMPLETE CBC W/AUTO DIFF WBC: CPT

## 2025-08-25 PROCEDURE — 86900 BLOOD TYPING SEROLOGIC ABO: CPT

## 2025-08-27 ENCOUNTER — HOSPITAL ENCOUNTER (INPATIENT)
Facility: HOSPITAL | Age: 35
LOS: 3 days | Discharge: HOME | End: 2025-08-30
Attending: OBSTETRICS & GYNECOLOGY | Admitting: OBSTETRICS & GYNECOLOGY
Payer: COMMERCIAL

## 2025-08-27 PROBLEM — O24.414 GESTATIONAL DIABETES MELLITUS (GDM) REQUIRING INSULIN: Status: ACTIVE | Noted: 2025-08-27

## 2025-08-27 LAB
ABO + RH BLD: NORMAL
ABO + RH BLD: NORMAL
ALBUMIN SERPL-MCNC: 2.8 G/DL (ref 3.5–5.2)
ALBUMIN/GLOB SERPL: 0.8 (ref 1.1–2.2)
ALP SERPL-CCNC: 126 U/L (ref 35–104)
ALT SERPL-CCNC: 13 U/L (ref 10–35)
ANION GAP SERPL CALC-SCNC: 15 MMOL/L (ref 2–14)
AST SERPL-CCNC: 20 U/L (ref 10–35)
BILIRUB SERPL-MCNC: 0.4 MG/DL (ref 0–1.2)
BLOOD GROUP ANTIBODIES SERPL: NORMAL
BLOOD GROUP ANTIBODIES SERPL: NORMAL
BUN SERPL-MCNC: 9 MG/DL (ref 6–20)
BUN/CREAT SERPL: 13 (ref 12–20)
CALCIUM SERPL-MCNC: 9.5 MG/DL (ref 8.6–10)
CHLORIDE SERPL-SCNC: 103 MMOL/L (ref 98–107)
CO2 SERPL-SCNC: 17 MMOL/L (ref 20–29)
CREAT SERPL-MCNC: 0.67 MG/DL (ref 0.6–1)
GLOBULIN SER CALC-MCNC: 3.4 G/DL (ref 2–4)
GLUCOSE SERPL-MCNC: 107 MG/DL (ref 65–100)
POTASSIUM SERPL-SCNC: 4.1 MMOL/L (ref 3.5–5.1)
PROT SERPL-MCNC: 6.2 G/DL (ref 6.4–8.3)
SODIUM SERPL-SCNC: 135 MMOL/L (ref 136–145)
SPECIMEN EXP DATE BLD: NORMAL
SPECIMEN EXP DATE BLD: NORMAL

## 2025-08-27 PROCEDURE — 85027 COMPLETE CBC AUTOMATED: CPT

## 2025-08-27 PROCEDURE — 4A1HXCZ MONITORING OF PRODUCTS OF CONCEPTION, CARDIAC RATE, EXTERNAL APPROACH: ICD-10-PCS | Performed by: STUDENT IN AN ORGANIZED HEALTH CARE EDUCATION/TRAINING PROGRAM

## 2025-08-27 PROCEDURE — 10H073Z INSERTION OF MONITORING ELECTRODE INTO PRODUCTS OF CONCEPTION, VIA NATURAL OR ARTIFICIAL OPENING: ICD-10-PCS | Performed by: STUDENT IN AN ORGANIZED HEALTH CARE EDUCATION/TRAINING PROGRAM

## 2025-08-27 PROCEDURE — 1100000000 HC RM PRIVATE

## 2025-08-27 PROCEDURE — 6370000000 HC RX 637 (ALT 250 FOR IP): Performed by: MIDWIFE

## 2025-08-27 PROCEDURE — 3E0DXGC INTRODUCTION OF OTHER THERAPEUTIC SUBSTANCE INTO MOUTH AND PHARYNX, EXTERNAL APPROACH: ICD-10-PCS | Performed by: MIDWIFE

## 2025-08-27 PROCEDURE — 80053 COMPREHEN METABOLIC PANEL: CPT

## 2025-08-27 PROCEDURE — 86850 RBC ANTIBODY SCREEN: CPT

## 2025-08-27 PROCEDURE — 86901 BLOOD TYPING SEROLOGIC RH(D): CPT

## 2025-08-27 PROCEDURE — 10H07YZ INSERTION OF OTHER DEVICE INTO PRODUCTS OF CONCEPTION, VIA NATURAL OR ARTIFICIAL OPENING: ICD-10-PCS | Performed by: STUDENT IN AN ORGANIZED HEALTH CARE EDUCATION/TRAINING PROGRAM

## 2025-08-27 PROCEDURE — 86900 BLOOD TYPING SEROLOGIC ABO: CPT

## 2025-08-27 PROCEDURE — 59200 INSERT CERVICAL DILATOR: CPT

## 2025-08-27 PROCEDURE — 7210000100 HC LABOR FEE PER 1 HR

## 2025-08-27 RX ORDER — SODIUM CHLORIDE 9 MG/ML
25 INJECTION, SOLUTION INTRAVENOUS PRN
Status: DISCONTINUED | OUTPATIENT
Start: 2025-08-27 | End: 2025-08-29

## 2025-08-27 RX ORDER — ACETAMINOPHEN 325 MG/1
650 TABLET ORAL EVERY 4 HOURS PRN
Status: DISCONTINUED | OUTPATIENT
Start: 2025-08-27 | End: 2025-08-29

## 2025-08-27 RX ORDER — SEVOFLURANE 250 ML/250ML
1 LIQUID RESPIRATORY (INHALATION) CONTINUOUS PRN
Status: DISCONTINUED | OUTPATIENT
Start: 2025-08-27 | End: 2025-08-29

## 2025-08-27 RX ORDER — SODIUM CHLORIDE 0.9 % (FLUSH) 0.9 %
5-40 SYRINGE (ML) INJECTION PRN
Status: DISCONTINUED | OUTPATIENT
Start: 2025-08-27 | End: 2025-08-29

## 2025-08-27 RX ORDER — NALBUPHINE HYDROCHLORIDE 10 MG/ML
10 INJECTION INTRAMUSCULAR; INTRAVENOUS; SUBCUTANEOUS
Status: DISCONTINUED | OUTPATIENT
Start: 2025-08-27 | End: 2025-08-29

## 2025-08-27 RX ORDER — LOPERAMIDE HYDROCHLORIDE 2 MG/1
2 CAPSULE ORAL PRN
Status: DISCONTINUED | OUTPATIENT
Start: 2025-08-27 | End: 2025-08-29

## 2025-08-27 RX ORDER — SODIUM CHLORIDE 0.9 % (FLUSH) 0.9 %
5-40 SYRINGE (ML) INJECTION EVERY 12 HOURS SCHEDULED
Status: DISCONTINUED | OUTPATIENT
Start: 2025-08-27 | End: 2025-08-29

## 2025-08-27 RX ORDER — LIDOCAINE HYDROCHLORIDE 10 MG/ML
30 INJECTION, SOLUTION EPIDURAL; INFILTRATION; INTRACAUDAL; PERINEURAL PRN
Status: DISCONTINUED | OUTPATIENT
Start: 2025-08-27 | End: 2025-08-29

## 2025-08-27 RX ORDER — TERBUTALINE SULFATE 1 MG/ML
0.25 INJECTION SUBCUTANEOUS
Status: DISCONTINUED | OUTPATIENT
Start: 2025-08-27 | End: 2025-08-29

## 2025-08-27 RX ADMIN — INSULIN HUMAN 6 UNITS: 100 INJECTION, SUSPENSION SUBCUTANEOUS at 23:08

## 2025-08-27 RX ADMIN — Medication 25 MCG: at 22:54

## 2025-08-28 ENCOUNTER — ANESTHESIA (OUTPATIENT)
Facility: HOSPITAL | Age: 35
End: 2025-08-28
Payer: COMMERCIAL

## 2025-08-28 ENCOUNTER — ANESTHESIA EVENT (OUTPATIENT)
Facility: HOSPITAL | Age: 35
End: 2025-08-28
Payer: COMMERCIAL

## 2025-08-28 LAB
ERYTHROCYTE [DISTWIDTH] IN BLOOD BY AUTOMATED COUNT: 15.7 % (ref 11.5–14.5)
GLUCOSE BLD STRIP.AUTO-MCNC: 88 MG/DL (ref 65–117)
GLUCOSE BLD STRIP.AUTO-MCNC: 90 MG/DL (ref 65–117)
HCT VFR BLD AUTO: 36.2 % (ref 35–47)
HGB BLD-MCNC: 12.1 G/DL (ref 11.5–16)
MCH RBC QN AUTO: 27.8 PG (ref 26–34)
MCHC RBC AUTO-ENTMCNC: 33.4 G/DL (ref 30–36.5)
MCV RBC AUTO: 83 FL (ref 80–99)
NRBC # BLD: 0 K/UL (ref 0–0.01)
NRBC BLD-RTO: 0 PER 100 WBC
PLATELET # BLD AUTO: 332 K/UL (ref 150–400)
PMV BLD AUTO: 13 FL (ref 8.9–12.9)
RBC # BLD AUTO: 4.36 M/UL (ref 3.8–5.2)
SERVICE CMNT-IMP: NORMAL
SERVICE CMNT-IMP: NORMAL
WBC # BLD AUTO: 12.9 K/UL (ref 3.6–11)

## 2025-08-28 PROCEDURE — 6360000002 HC RX W HCPCS: Performed by: ANESTHESIOLOGY

## 2025-08-28 PROCEDURE — 7100000000 HC PACU RECOVERY - FIRST 15 MIN

## 2025-08-28 PROCEDURE — 6360000002 HC RX W HCPCS: Performed by: MIDWIFE

## 2025-08-28 PROCEDURE — 10907ZC DRAINAGE OF AMNIOTIC FLUID, THERAPEUTIC FROM PRODUCTS OF CONCEPTION, VIA NATURAL OR ARTIFICIAL OPENING: ICD-10-PCS | Performed by: STUDENT IN AN ORGANIZED HEALTH CARE EDUCATION/TRAINING PROGRAM

## 2025-08-28 PROCEDURE — 51701 INSERT BLADDER CATHETER: CPT

## 2025-08-28 PROCEDURE — 82962 GLUCOSE BLOOD TEST: CPT

## 2025-08-28 PROCEDURE — 6370000000 HC RX 637 (ALT 250 FOR IP): Performed by: STUDENT IN AN ORGANIZED HEALTH CARE EDUCATION/TRAINING PROGRAM

## 2025-08-28 PROCEDURE — 6360000002 HC RX W HCPCS: Performed by: STUDENT IN AN ORGANIZED HEALTH CARE EDUCATION/TRAINING PROGRAM

## 2025-08-28 PROCEDURE — 1100000000 HC RM PRIVATE

## 2025-08-28 PROCEDURE — 85025 COMPLETE CBC W/AUTO DIFF WBC: CPT

## 2025-08-28 PROCEDURE — 6370000000 HC RX 637 (ALT 250 FOR IP): Performed by: MIDWIFE

## 2025-08-28 PROCEDURE — 6360000002 HC RX W HCPCS: Performed by: OBSTETRICS & GYNECOLOGY

## 2025-08-28 PROCEDURE — 2580000003 HC RX 258: Performed by: MIDWIFE

## 2025-08-28 PROCEDURE — 2500000003 HC RX 250 WO HCPCS: Performed by: STUDENT IN AN ORGANIZED HEALTH CARE EDUCATION/TRAINING PROGRAM

## 2025-08-28 PROCEDURE — 00HU33Z INSERTION OF INFUSION DEVICE INTO SPINAL CANAL, PERCUTANEOUS APPROACH: ICD-10-PCS | Performed by: ANESTHESIOLOGY

## 2025-08-28 PROCEDURE — 2580000003 HC RX 258: Performed by: OBSTETRICS & GYNECOLOGY

## 2025-08-28 PROCEDURE — 2500000003 HC RX 250 WO HCPCS: Performed by: ANESTHESIOLOGY

## 2025-08-28 PROCEDURE — 7220000101 HC DELIVERY VAGINAL/SINGLE

## 2025-08-28 PROCEDURE — 6360000002 HC RX W HCPCS

## 2025-08-28 PROCEDURE — 2580000003 HC RX 258: Performed by: STUDENT IN AN ORGANIZED HEALTH CARE EDUCATION/TRAINING PROGRAM

## 2025-08-28 PROCEDURE — 7210000100 HC LABOR FEE PER 1 HR

## 2025-08-28 PROCEDURE — 7100000001 HC PACU RECOVERY - ADDTL 15 MIN

## 2025-08-28 PROCEDURE — 3700000025 EPIDURAL BLOCK: Performed by: ANESTHESIOLOGY

## 2025-08-28 RX ORDER — SODIUM CHLORIDE, SODIUM LACTATE, POTASSIUM CHLORIDE, AND CALCIUM CHLORIDE .6; .31; .03; .02 G/100ML; G/100ML; G/100ML; G/100ML
1000 INJECTION, SOLUTION INTRAVENOUS ONCE
Status: COMPLETED | OUTPATIENT
Start: 2025-08-28 | End: 2025-08-28

## 2025-08-28 RX ORDER — MAGNESIUM SULFATE IN WATER 40 MG/ML
INJECTION, SOLUTION INTRAVENOUS
Status: COMPLETED
Start: 2025-08-28 | End: 2025-08-28

## 2025-08-28 RX ORDER — MISOPROSTOL 200 UG/1
400 TABLET ORAL PRN
Status: DISCONTINUED | OUTPATIENT
Start: 2025-08-28 | End: 2025-08-29

## 2025-08-28 RX ORDER — ONDANSETRON 4 MG/1
4 TABLET, ORALLY DISINTEGRATING ORAL EVERY 6 HOURS PRN
Status: DISCONTINUED | OUTPATIENT
Start: 2025-08-28 | End: 2025-08-29

## 2025-08-28 RX ORDER — SODIUM CHLORIDE, SODIUM LACTATE, POTASSIUM CHLORIDE, CALCIUM CHLORIDE 600; 310; 30; 20 MG/100ML; MG/100ML; MG/100ML; MG/100ML
INJECTION, SOLUTION INTRAVENOUS CONTINUOUS
Status: DISCONTINUED | OUTPATIENT
Start: 2025-08-28 | End: 2025-08-30 | Stop reason: HOSPADM

## 2025-08-28 RX ORDER — LABETALOL HYDROCHLORIDE 5 MG/ML
20 INJECTION, SOLUTION INTRAVENOUS ONCE
Status: COMPLETED | OUTPATIENT
Start: 2025-08-28 | End: 2025-08-28

## 2025-08-28 RX ORDER — SODIUM CHLORIDE, SODIUM LACTATE, POTASSIUM CHLORIDE, AND CALCIUM CHLORIDE .6; .31; .03; .02 G/100ML; G/100ML; G/100ML; G/100ML
500 INJECTION, SOLUTION INTRAVENOUS ONCE
Status: COMPLETED | OUTPATIENT
Start: 2025-08-28 | End: 2025-08-28

## 2025-08-28 RX ORDER — BUPIVACAINE HYDROCHLORIDE 2.5 MG/ML
INJECTION, SOLUTION EPIDURAL; INFILTRATION; INTRACAUDAL; PERINEURAL
Status: COMPLETED
Start: 2025-08-28 | End: 2025-08-28

## 2025-08-28 RX ORDER — ONDANSETRON 2 MG/ML
4 INJECTION INTRAMUSCULAR; INTRAVENOUS EVERY 6 HOURS PRN
Status: DISCONTINUED | OUTPATIENT
Start: 2025-08-28 | End: 2025-08-29

## 2025-08-28 RX ORDER — FENTANYL CITRATE 50 UG/ML
INJECTION, SOLUTION INTRAMUSCULAR; INTRAVENOUS
Status: COMPLETED
Start: 2025-08-28 | End: 2025-08-28

## 2025-08-28 RX ORDER — CARBOPROST TROMETHAMINE 250 UG/ML
250 INJECTION, SOLUTION INTRAMUSCULAR PRN
Status: DISCONTINUED | OUTPATIENT
Start: 2025-08-28 | End: 2025-08-29

## 2025-08-28 RX ORDER — ONDANSETRON 2 MG/ML
4 INJECTION INTRAMUSCULAR; INTRAVENOUS EVERY 6 HOURS PRN
Status: DISCONTINUED | OUTPATIENT
Start: 2025-08-28 | End: 2025-08-28 | Stop reason: SDUPTHER

## 2025-08-28 RX ORDER — FENTANYL CITRATE 0.05 MG/ML
100 INJECTION, SOLUTION INTRAMUSCULAR; INTRAVENOUS ONCE
Status: DISCONTINUED | OUTPATIENT
Start: 2025-08-28 | End: 2025-08-30 | Stop reason: HOSPADM

## 2025-08-28 RX ORDER — BUPIVACAINE HYDROCHLORIDE 2.5 MG/ML
INJECTION, SOLUTION EPIDURAL; INFILTRATION; INTRACAUDAL; PERINEURAL
Status: DISCONTINUED | OUTPATIENT
Start: 2025-08-28 | End: 2025-08-28 | Stop reason: SDUPTHER

## 2025-08-28 RX ORDER — MAGNESIUM SULFATE HEPTAHYDRATE 40 MG/ML
4000 INJECTION, SOLUTION INTRAVENOUS ONCE
Status: COMPLETED | OUTPATIENT
Start: 2025-08-28 | End: 2025-08-28

## 2025-08-28 RX ORDER — METHYLERGONOVINE MALEATE 0.2 MG/ML
200 INJECTION INTRAVENOUS PRN
Status: DISCONTINUED | OUTPATIENT
Start: 2025-08-28 | End: 2025-08-30 | Stop reason: HOSPADM

## 2025-08-28 RX ORDER — MODIFIED LANOLIN
OINTMENT (GRAM) TOPICAL PRN
Status: DISCONTINUED | OUTPATIENT
Start: 2025-08-28 | End: 2025-08-30 | Stop reason: HOSPADM

## 2025-08-28 RX ORDER — LABETALOL HYDROCHLORIDE 5 MG/ML
40 INJECTION, SOLUTION INTRAVENOUS ONCE
Status: DISCONTINUED | OUTPATIENT
Start: 2025-08-28 | End: 2025-08-30 | Stop reason: HOSPADM

## 2025-08-28 RX ORDER — SODIUM CHLORIDE 9 MG/ML
INJECTION, SOLUTION INTRAVENOUS PRN
Status: DISCONTINUED | OUTPATIENT
Start: 2025-08-28 | End: 2025-08-30 | Stop reason: HOSPADM

## 2025-08-28 RX ORDER — ONDANSETRON 2 MG/ML
4 INJECTION INTRAMUSCULAR; INTRAVENOUS EVERY 6 HOURS PRN
Status: DISCONTINUED | OUTPATIENT
Start: 2025-08-28 | End: 2025-08-30 | Stop reason: HOSPADM

## 2025-08-28 RX ORDER — BISACODYL 10 MG
10 SUPPOSITORY, RECTAL RECTAL DAILY PRN
Status: DISCONTINUED | OUTPATIENT
Start: 2025-08-28 | End: 2025-08-30 | Stop reason: HOSPADM

## 2025-08-28 RX ORDER — MISOPROSTOL 200 UG/1
200 TABLET ORAL PRN
Status: DISCONTINUED | OUTPATIENT
Start: 2025-08-28 | End: 2025-08-30 | Stop reason: HOSPADM

## 2025-08-28 RX ORDER — SODIUM CHLORIDE 0.9 % (FLUSH) 0.9 %
5-40 SYRINGE (ML) INJECTION EVERY 12 HOURS SCHEDULED
Status: DISCONTINUED | OUTPATIENT
Start: 2025-08-28 | End: 2025-08-30 | Stop reason: HOSPADM

## 2025-08-28 RX ORDER — ACETAMINOPHEN 500 MG
1000 TABLET ORAL EVERY 8 HOURS
Status: DISCONTINUED | OUTPATIENT
Start: 2025-08-28 | End: 2025-08-30 | Stop reason: HOSPADM

## 2025-08-28 RX ORDER — LOPERAMIDE HYDROCHLORIDE 2 MG/1
2 CAPSULE ORAL PRN
Status: DISCONTINUED | OUTPATIENT
Start: 2025-08-28 | End: 2025-08-30 | Stop reason: HOSPADM

## 2025-08-28 RX ORDER — EPHEDRINE SULFATE 50 MG/ML
10 INJECTION INTRAVENOUS
Status: DISCONTINUED | OUTPATIENT
Start: 2025-08-28 | End: 2025-08-30 | Stop reason: HOSPADM

## 2025-08-28 RX ORDER — SIMETHICONE 80 MG
80 TABLET,CHEWABLE ORAL EVERY 6 HOURS PRN
Status: DISCONTINUED | OUTPATIENT
Start: 2025-08-28 | End: 2025-08-30 | Stop reason: HOSPADM

## 2025-08-28 RX ORDER — FENTANYL/BUPIVACAINE/NS/PF 2-1250MCG
1-15 PLASTIC BAG, INJECTION (ML) INJECTION CONTINUOUS
Refills: 0 | Status: DISCONTINUED | OUTPATIENT
Start: 2025-08-28 | End: 2025-08-30 | Stop reason: HOSPADM

## 2025-08-28 RX ORDER — LABETALOL HYDROCHLORIDE 5 MG/ML
80 INJECTION, SOLUTION INTRAVENOUS ONCE
Status: DISCONTINUED | OUTPATIENT
Start: 2025-08-28 | End: 2025-08-30 | Stop reason: HOSPADM

## 2025-08-28 RX ORDER — SODIUM CHLORIDE, SODIUM LACTATE, POTASSIUM CHLORIDE, CALCIUM CHLORIDE 600; 310; 30; 20 MG/100ML; MG/100ML; MG/100ML; MG/100ML
INJECTION, SOLUTION INTRAVENOUS CONTINUOUS
Status: DISCONTINUED | OUTPATIENT
Start: 2025-08-28 | End: 2025-08-29

## 2025-08-28 RX ORDER — CALCIUM GLUCONATE 98 MG/ML
1000 INJECTION, SOLUTION INTRAVENOUS PRN
Status: DISCONTINUED | OUTPATIENT
Start: 2025-08-28 | End: 2025-08-30 | Stop reason: HOSPADM

## 2025-08-28 RX ORDER — LIDOCAINE HYDROCHLORIDE AND EPINEPHRINE 20; 5 MG/ML; UG/ML
INJECTION, SOLUTION EPIDURAL; INFILTRATION; INTRACAUDAL; PERINEURAL
Status: COMPLETED
Start: 2025-08-28 | End: 2025-08-28

## 2025-08-28 RX ORDER — LABETALOL HYDROCHLORIDE 5 MG/ML
INJECTION, SOLUTION INTRAVENOUS
Status: COMPLETED
Start: 2025-08-28 | End: 2025-08-28

## 2025-08-28 RX ORDER — MISOPROSTOL 200 UG/1
800 TABLET ORAL PRN
Status: COMPLETED | OUTPATIENT
Start: 2025-08-28 | End: 2025-08-28

## 2025-08-28 RX ORDER — SODIUM CHLORIDE, SODIUM LACTATE, POTASSIUM CHLORIDE, AND CALCIUM CHLORIDE .6; .31; .03; .02 G/100ML; G/100ML; G/100ML; G/100ML
500 INJECTION, SOLUTION INTRAVENOUS PRN
Status: DISCONTINUED | OUTPATIENT
Start: 2025-08-28 | End: 2025-08-29

## 2025-08-28 RX ORDER — FENTANYL CITRATE 50 UG/ML
INJECTION, SOLUTION INTRAMUSCULAR; INTRAVENOUS
Status: DISCONTINUED | OUTPATIENT
Start: 2025-08-28 | End: 2025-08-28 | Stop reason: SDUPTHER

## 2025-08-28 RX ORDER — SODIUM CHLORIDE 0.9 % (FLUSH) 0.9 %
5-40 SYRINGE (ML) INJECTION PRN
Status: DISCONTINUED | OUTPATIENT
Start: 2025-08-28 | End: 2025-08-30 | Stop reason: HOSPADM

## 2025-08-28 RX ORDER — METHYLERGONOVINE MALEATE 0.2 MG/ML
200 INJECTION INTRAVENOUS PRN
Status: DISCONTINUED | OUTPATIENT
Start: 2025-08-28 | End: 2025-08-29

## 2025-08-28 RX ORDER — NALBUPHINE HYDROCHLORIDE 10 MG/ML
5 INJECTION INTRAMUSCULAR; INTRAVENOUS; SUBCUTANEOUS EVERY 4 HOURS PRN
Status: DISCONTINUED | OUTPATIENT
Start: 2025-08-28 | End: 2025-08-30 | Stop reason: HOSPADM

## 2025-08-28 RX ORDER — LIDOCAINE HYDROCHLORIDE AND EPINEPHRINE 20; 5 MG/ML; UG/ML
INJECTION, SOLUTION EPIDURAL; INFILTRATION; INTRACAUDAL; PERINEURAL
Status: DISCONTINUED | OUTPATIENT
Start: 2025-08-28 | End: 2025-08-28 | Stop reason: SDUPTHER

## 2025-08-28 RX ORDER — IBUPROFEN 400 MG/1
800 TABLET, FILM COATED ORAL EVERY 8 HOURS
Status: DISCONTINUED | OUTPATIENT
Start: 2025-08-28 | End: 2025-08-30 | Stop reason: HOSPADM

## 2025-08-28 RX ORDER — ONDANSETRON 4 MG/1
4 TABLET, ORALLY DISINTEGRATING ORAL EVERY 6 HOURS PRN
Status: DISCONTINUED | OUTPATIENT
Start: 2025-08-28 | End: 2025-08-30 | Stop reason: HOSPADM

## 2025-08-28 RX ORDER — FENTANYL CITRATE 0.05 MG/ML
100 INJECTION, SOLUTION INTRAMUSCULAR; INTRAVENOUS ONCE
Status: COMPLETED | OUTPATIENT
Start: 2025-08-28 | End: 2025-08-28

## 2025-08-28 RX ADMIN — SODIUM CHLORIDE, SODIUM LACTATE, POTASSIUM CHLORIDE, AND CALCIUM CHLORIDE 500 ML: .6; .31; .03; .02 INJECTION, SOLUTION INTRAVENOUS at 03:19

## 2025-08-28 RX ADMIN — SODIUM CHLORIDE, SODIUM LACTATE, POTASSIUM CHLORIDE, AND CALCIUM CHLORIDE 1000 ML: .6; .31; .03; .02 INJECTION, SOLUTION INTRAVENOUS at 07:11

## 2025-08-28 RX ADMIN — SODIUM CHLORIDE, SODIUM LACTATE, POTASSIUM CHLORIDE, AND CALCIUM CHLORIDE 75 ML/HR: .6; .31; .03; .02 INJECTION, SOLUTION INTRAVENOUS at 19:25

## 2025-08-28 RX ADMIN — MAGNESIUM SULFATE HEPTAHYDRATE 2000 MG/HR: 40 INJECTION, SOLUTION INTRAVENOUS at 20:41

## 2025-08-28 RX ADMIN — IBUPROFEN 800 MG: 400 TABLET, FILM COATED ORAL at 16:23

## 2025-08-28 RX ADMIN — MAGNESIUM SULFATE HEPTAHYDRATE 4000 MG: 40 INJECTION, SOLUTION INTRAVENOUS at 17:54

## 2025-08-28 RX ADMIN — OXYTOCIN 87.3 MILLI-UNITS/MIN: 10 INJECTION INTRAVENOUS at 22:38

## 2025-08-28 RX ADMIN — SODIUM CHLORIDE 1250 MG: 9 INJECTION, SOLUTION INTRAVENOUS at 22:27

## 2025-08-28 RX ADMIN — LABETALOL HYDROCHLORIDE 40 MG: 5 INJECTION, SOLUTION INTRAVENOUS at 17:30

## 2025-08-28 RX ADMIN — OXYTOCIN 1 MILLI-UNITS/MIN: 10 INJECTION, SOLUTION INTRAMUSCULAR; INTRAVENOUS at 11:20

## 2025-08-28 RX ADMIN — LABETALOL HYDROCHLORIDE 20 MG: 5 INJECTION, SOLUTION INTRAVENOUS at 17:14

## 2025-08-28 RX ADMIN — SODIUM CHLORIDE, SODIUM LACTATE, POTASSIUM CHLORIDE, AND CALCIUM CHLORIDE 125 ML/HR: .6; .31; .03; .02 INJECTION, SOLUTION INTRAVENOUS at 10:22

## 2025-08-28 RX ADMIN — ACETAMINOPHEN 1000 MG: 500 TABLET ORAL at 16:23

## 2025-08-28 RX ADMIN — Medication 25 MCG: at 02:44

## 2025-08-28 RX ADMIN — BUPIVACAINE HYDROCHLORIDE 5 ML: 2.5 INJECTION, SOLUTION EPIDURAL; INFILTRATION; INTRACAUDAL; PERINEURAL at 08:01

## 2025-08-28 RX ADMIN — Medication 166.7 ML: at 15:44

## 2025-08-28 RX ADMIN — MISOPROSTOL 800 MCG: 200 TABLET ORAL at 22:28

## 2025-08-28 RX ADMIN — FENTANYL CITRATE 100 MCG: 50 INJECTION INTRAMUSCULAR; INTRAVENOUS at 08:01

## 2025-08-28 RX ADMIN — MAGNESIUM SULFATE HEPTAHYDRATE 2000 MG/HR: 40 INJECTION, SOLUTION INTRAVENOUS at 18:21

## 2025-08-28 RX ADMIN — LIDOCAINE HYDROCHLORIDE,EPINEPHRINE BITARTRATE 3 ML: 20; .005 INJECTION, SOLUTION EPIDURAL; INFILTRATION; INTRACAUDAL; PERINEURAL at 08:01

## 2025-08-28 RX ADMIN — BUPIVACAINE HYDROCHLORIDE 5 ML: 2.5 INJECTION, SOLUTION EPIDURAL; INFILTRATION; INTRACAUDAL; PERINEURAL at 12:41

## 2025-08-28 RX ADMIN — OXYTOCIN 999 MILLI-UNITS/MIN: 10 INJECTION INTRAVENOUS at 22:03

## 2025-08-28 RX ADMIN — TRANEXAMIC ACID 1000 MG: 1 INJECTION, SOLUTION INTRAVENOUS at 22:50

## 2025-08-28 RX ADMIN — FENTANYL CITRATE 100 MCG: 0.05 INJECTION, SOLUTION INTRAMUSCULAR; INTRAVENOUS at 21:43

## 2025-08-28 RX ADMIN — SODIUM CHLORIDE 1250 MG: 9 INJECTION, SOLUTION INTRAVENOUS at 06:50

## 2025-08-28 RX ADMIN — Medication 10 ML/HR: at 07:55

## 2025-08-28 ASSESSMENT — PAIN DESCRIPTION - DESCRIPTORS: DESCRIPTORS: CRAMPING

## 2025-08-28 ASSESSMENT — PAIN DESCRIPTION - LOCATION: LOCATION: ABDOMEN

## 2025-08-28 ASSESSMENT — PAIN - FUNCTIONAL ASSESSMENT: PAIN_FUNCTIONAL_ASSESSMENT: 0-10

## 2025-08-28 ASSESSMENT — PAIN SCALES - GENERAL: PAINLEVEL_OUTOF10: 3

## 2025-08-29 LAB
BASOPHILS # BLD: 0.06 K/UL (ref 0–0.1)
BASOPHILS NFR BLD: 0.3 % (ref 0–1)
DIFFERENTIAL METHOD BLD: ABNORMAL
EOSINOPHIL # BLD: 0.05 K/UL (ref 0–0.4)
EOSINOPHIL NFR BLD: 0.3 % (ref 0–7)
ERYTHROCYTE [DISTWIDTH] IN BLOOD BY AUTOMATED COUNT: 15.9 % (ref 11.5–14.5)
HCT VFR BLD AUTO: 30.4 % (ref 35–47)
HGB BLD-MCNC: 10 G/DL (ref 11.5–16)
IMM GRANULOCYTES # BLD AUTO: 0.15 K/UL (ref 0–0.04)
IMM GRANULOCYTES NFR BLD AUTO: 0.8 % (ref 0–0.5)
LYMPHOCYTES # BLD: 2.26 K/UL (ref 0.8–3.5)
LYMPHOCYTES NFR BLD: 11.9 % (ref 12–49)
MCH RBC QN AUTO: 27.7 PG (ref 26–34)
MCHC RBC AUTO-ENTMCNC: 32.9 G/DL (ref 30–36.5)
MCV RBC AUTO: 84.2 FL (ref 80–99)
MONOCYTES # BLD: 1.39 K/UL (ref 0–1)
MONOCYTES NFR BLD: 7.3 % (ref 5–13)
NEUTS SEG # BLD: 15.02 K/UL (ref 1.8–8)
NEUTS SEG NFR BLD: 79.4 % (ref 32–75)
NRBC # BLD: 0 K/UL (ref 0–0.01)
NRBC BLD-RTO: 0 PER 100 WBC
PLATELET # BLD AUTO: 256 K/UL (ref 150–400)
PMV BLD AUTO: 12.5 FL (ref 8.9–12.9)
RBC # BLD AUTO: 3.61 M/UL (ref 3.8–5.2)
WBC # BLD AUTO: 18.9 K/UL (ref 3.6–11)

## 2025-08-29 PROCEDURE — 6370000000 HC RX 637 (ALT 250 FOR IP): Performed by: STUDENT IN AN ORGANIZED HEALTH CARE EDUCATION/TRAINING PROGRAM

## 2025-08-29 PROCEDURE — 1120000000 HC RM PRIVATE OB

## 2025-08-29 RX ADMIN — ACETAMINOPHEN 1000 MG: 500 TABLET ORAL at 17:53

## 2025-08-29 RX ADMIN — IBUPROFEN 800 MG: 400 TABLET, FILM COATED ORAL at 01:29

## 2025-08-29 RX ADMIN — ACETAMINOPHEN 1000 MG: 500 TABLET ORAL at 09:48

## 2025-08-29 RX ADMIN — ACETAMINOPHEN 1000 MG: 500 TABLET ORAL at 01:29

## 2025-08-29 RX ADMIN — IBUPROFEN 800 MG: 400 TABLET, FILM COATED ORAL at 09:48

## 2025-08-29 RX ADMIN — IBUPROFEN 800 MG: 400 TABLET, FILM COATED ORAL at 17:53

## 2025-08-29 ASSESSMENT — PAIN - FUNCTIONAL ASSESSMENT
PAIN_FUNCTIONAL_ASSESSMENT: 0-10
PAIN_FUNCTIONAL_ASSESSMENT: 0-10

## 2025-08-29 ASSESSMENT — PAIN DESCRIPTION - LOCATION
LOCATION: ABDOMEN
LOCATION: ABDOMEN

## 2025-08-29 ASSESSMENT — PAIN DESCRIPTION - DESCRIPTORS
DESCRIPTORS: ACHING
DESCRIPTORS: ACHING

## 2025-08-29 ASSESSMENT — PAIN DESCRIPTION - ORIENTATION
ORIENTATION: LOWER
ORIENTATION: LOWER

## 2025-08-29 ASSESSMENT — PAIN SCALES - GENERAL
PAINLEVEL_OUTOF10: 7
PAINLEVEL_OUTOF10: 8

## 2025-08-30 VITALS
BODY MASS INDEX: 53.04 KG/M2 | RESPIRATION RATE: 16 BRPM | DIASTOLIC BLOOD PRESSURE: 78 MMHG | TEMPERATURE: 98.3 F | OXYGEN SATURATION: 100 % | HEART RATE: 90 BPM | SYSTOLIC BLOOD PRESSURE: 145 MMHG | WEIGHT: 290 LBS

## 2025-08-30 PROCEDURE — 6370000000 HC RX 637 (ALT 250 FOR IP): Performed by: STUDENT IN AN ORGANIZED HEALTH CARE EDUCATION/TRAINING PROGRAM

## 2025-08-30 RX ORDER — IBUPROFEN 600 MG/1
600 TABLET, FILM COATED ORAL 4 TIMES DAILY PRN
Qty: 40 TABLET | Refills: 0 | Status: SHIPPED | OUTPATIENT
Start: 2025-08-30

## 2025-08-30 RX ADMIN — IBUPROFEN 800 MG: 400 TABLET, FILM COATED ORAL at 00:57

## 2025-08-30 RX ADMIN — IBUPROFEN 800 MG: 400 TABLET, FILM COATED ORAL at 08:44

## 2025-08-30 RX ADMIN — ACETAMINOPHEN 1000 MG: 500 TABLET ORAL at 00:57

## 2025-08-30 RX ADMIN — IBUPROFEN 800 MG: 400 TABLET, FILM COATED ORAL at 08:43

## 2025-08-30 ASSESSMENT — PAIN DESCRIPTION - ORIENTATION: ORIENTATION: LOWER

## 2025-08-30 ASSESSMENT — PAIN SCALES - GENERAL: PAINLEVEL_OUTOF10: 5

## 2025-08-30 ASSESSMENT — PAIN - FUNCTIONAL ASSESSMENT
PAIN_FUNCTIONAL_ASSESSMENT: 0-10
PAIN_FUNCTIONAL_ASSESSMENT: ACTIVITIES ARE NOT PREVENTED

## 2025-08-30 ASSESSMENT — PAIN DESCRIPTION - LOCATION: LOCATION: ABDOMEN

## (undated) DEVICE — TUBING SUCT L6FT ID0.5IN CLR PLAS M CONN

## (undated) DEVICE — (D)SYR 10ML 1/5ML GRAD NSAF -- PKGING CHANGE USE ITEM 338027

## (undated) DEVICE — SET GRAV CK VLV NEEDLESS ST 3 GANGED 4WAY STPCOCK HI FLO 10

## (undated) DEVICE — COLLECTION KT SUC TISS BERK -- GYRUS

## (undated) DEVICE — SOLUTION IV STRL H2O 500 ML AQUALITE POUR BTL

## (undated) DEVICE — COVIDIEN KENDALL DL DISPOSABLE 3 LEAD SY: Brand: MEDLINE RENEWAL

## (undated) DEVICE — LIGHT HANDLE: Brand: DEVON

## (undated) DEVICE — DEVICE INFL 60ML 15ATM DISPOSABLE STERIFLATE CRE

## (undated) DEVICE — ENDO CARRY-ON PROCEDURE KIT INCLUDES ENZYMATIC SPONGE, GAUZE, BIOHAZARD LABEL, TRAY, LUBRICANT, DIRTY SCOPE LABEL, WATER LABEL, TRAY, DRAWSTRING PAD, AND DEFENDO 4-PIECE KIT.: Brand: ENDO CARRY-ON PROCEDURE KIT

## (undated) DEVICE — CATHETER IV 22GA L1IN OD0.8382-0.9144MM ID0.6096-0.6858MM 382523

## (undated) DEVICE — FORCEP BX LG CAP 2.4 MMX120 CM W/ NDL YEL RADIAL JAW 4 DISP

## (undated) DEVICE — BLOCK BITE ENDOSCP AD 21 MM W/ DIL BLU LF DISP

## (undated) DEVICE — FORCEPS BX L240CM JAW DIA2.8MM L CAP W/ NDL MIC MESH TOOTH

## (undated) DEVICE — CONTAINER SPEC 20 ML LID NEUT BUFF FORMALIN 10 % POLYPR STS

## (undated) DEVICE — IV START KIT: Brand: MEDLINE

## (undated) DEVICE — SOLIDIFIER MEDC 1200ML -- CONVERT TO 356117

## (undated) DEVICE — CURETTE VAC DIA10MM PLAS CRV OPN TIP RIG STR FIRM W/ FRST

## (undated) DEVICE — SOLUTION IV 1000ML 0.9% SOD CHL

## (undated) DEVICE — 1200 GUARD II KIT W/5MM TUBE W/O VAC TUBE: Brand: GUARDIAN

## (undated) DEVICE — ESOPHAGEAL BALLOON DILATATION CATHETER: Brand: CRE FIXED WIRE

## (undated) DEVICE — STERILE POLYISOPRENE POWDER-FREE SURGICAL GLOVES: Brand: PROTEXIS

## (undated) DEVICE — FILTER SET UTER VAC CURET SYS -- GYRUS

## (undated) DEVICE — INFECTION CONTROL KIT SYS

## (undated) DEVICE — GOWN,SIRUS,FABRNF,XL,20/CS: Brand: MEDLINE

## (undated) DEVICE — PREP PAD BNS: Brand: CONVERTORS

## (undated) DEVICE — D&C/GYN-LF: Brand: MEDLINE INDUSTRIES, INC.

## (undated) DEVICE — TOWEL SURG W17XL27IN STD BLU COT NONFENESTRATED PREWASHED